# Patient Record
Sex: MALE | Race: WHITE | NOT HISPANIC OR LATINO | Employment: FULL TIME | URBAN - METROPOLITAN AREA
[De-identification: names, ages, dates, MRNs, and addresses within clinical notes are randomized per-mention and may not be internally consistent; named-entity substitution may affect disease eponyms.]

---

## 2017-01-03 ENCOUNTER — OFFICE VISIT (OUTPATIENT)
Dept: FAMILY MEDICINE CLINIC | Facility: CLINIC | Age: 32
End: 2017-01-03

## 2017-01-03 VITALS
TEMPERATURE: 98.9 F | HEIGHT: 74 IN | HEART RATE: 85 BPM | OXYGEN SATURATION: 97 % | DIASTOLIC BLOOD PRESSURE: 88 MMHG | BODY MASS INDEX: 39.4 KG/M2 | WEIGHT: 307 LBS | SYSTOLIC BLOOD PRESSURE: 122 MMHG

## 2017-01-03 DIAGNOSIS — Z86.79 HISTORY OF HYPERTENSION: ICD-10-CM

## 2017-01-03 DIAGNOSIS — N41.0 ACUTE PROSTATITIS: Primary | ICD-10-CM

## 2017-01-03 DIAGNOSIS — Z83.3 FAMILY HISTORY OF DIABETES MELLITUS IN FATHER: ICD-10-CM

## 2017-01-03 PROCEDURE — 99204 OFFICE O/P NEW MOD 45 MIN: CPT | Performed by: NURSE PRACTITIONER

## 2017-01-03 RX ORDER — SULFAMETHOXAZOLE AND TRIMETHOPRIM 800; 160 MG/1; MG/1
TABLET ORAL
Refills: 0 | COMMUNITY
Start: 2016-12-27 | End: 2017-01-11 | Stop reason: SDUPTHER

## 2017-01-03 NOTE — MR AVS SNAPSHOT
Fazal Grissomton   1/3/2017 2:00 PM   Office Visit    Provider:  MELODIE Ponce   Department:  [facility]   Dept Phone:  [phone]                Your Full Care Plan              Your Updated Medication List          This list is accurate as of: 1/3/17  2:37 PM.  Always use your most recent med list.                sulfamethoxazole-trimethoprim 800-160 MG per tablet   Commonly known as:  BACTRIM DS,SEPTRA DS               We Performed the Following     CBC and Differential     Comprehensive metabolic panel     Lipid panel     TSH     Urine Culture (Clean Catch)       You Were Diagnosed With        Codes Comments    Family history of diabetes mellitus in father    -  Primary ICD-10-CM: Z83.3  ICD-9-CM: V18.0     History of hypertension     ICD-10-CM: Z86.79  ICD-9-CM: V12.59     Acute prostatitis     ICD-10-CM: N41.0  ICD-9-CM: 601.0       Instructions     None    Patient Instructions History      Pica[employer]hart Signup     [facility] [employer] allows you to send messages to your doctor, view your test results, renew your prescriptions, schedule appointments, and more. To sign up, go to [employer] and click on the Sign Up Now link in the New User? box. Enter your [employer] Activation Code exactly as it appears below along with the last four digits of your Social Security Number and your Date of Birth () to complete the sign-up process. If you do not sign up before the expiration date, you must request a new code.    [employer] Activation Code: [email or web address]  Expires: 2017  2:37 PM    If you have questions, you can email [email or web address] or call [phone] to talk to our [employer] staff. Remember, [employer] is NOT to be used for urgent needs. For medical emergencies, dial 911.               Other Info from Your Visit           Allergies     No Known Allergies      Reason for Visit     urine problem establish care, went  "to [facility]      Vital Signs     Blood Pressure Pulse Temperature Height Weight Oxygen Saturation    122/88 85 98.9 °F (37.2 °C) 74\" (188 cm) 307 lb (139 kg) 97%    Body Mass Index Smoking Status                39.42 kg/m2 Current Every Day Smoker          Problems and Diagnoses Noted     Family history of diabetes mellitus in father    -  Primary    History of hypertension        Inflammatory disease of prostate          "

## 2017-01-03 NOTE — PROGRESS NOTES
Subjective   Fazal Tavarez is a 31 y.o. male.     History of Present Illness   Fazal Tavarez 31 y.o. male who presents today for a new patient appointment.    he has a history of There is no problem list on file for this patient.  .  I reviewed the PFSH recorded today by my MA/LPN staff.   he is here to establish care.  He has been feeling well.  Patient was seen at [facility] on 12/27 for urinary frequency and urgency. He was diagnosed with acute prostatitis.  He was started on Bactrim DS BID x 10 days.  He was told to f/u with PCP. He has 6 doses left. He states he feels some improvement but still having some frequency and urgency.  Will get urine culture.  Will repeat urinalysis after patient finishes abx.  He reports hx of high blood pressure but it is 122/88 today.  He does not take any medications.  He is a smoker and has smoked 1 and 1/2 PPD x 10 years. He quit for a year but then restarted. He is not ready to quit at this time but will f/u if he would like assistance.  Reports grandfather with lung CA, uncle with prostate CA and father with type 2 DM.  Patient has not had labs in some time.     The following portions of the patient's history were reviewed and updated as appropriate: allergies, current medications, past family history, past medical history, past social history, past surgical history and problem list.    Review of Systems   Genitourinary: Positive for frequency and urgency. Negative for dysuria.       Objective   Physical Exam   Constitutional: He is oriented to person, place, and time. He appears well-developed and well-nourished.   HENT:   Head: Normocephalic and atraumatic.   Neck: Carotid bruit is not present.   Cardiovascular: Normal rate and regular rhythm.    Pulmonary/Chest: Effort normal and breath sounds normal.   Neurological: He is alert and oriented to person, place, and time.   Skin: Skin is warm and dry.   Psychiatric: He has a normal mood and  affect. Judgment normal.   Vitals reviewed.      Assessment/Plan   Fazal was seen today for urine problem.    Diagnoses and all orders for this visit:    Acute prostatitis  -     Urine Culture (Clean Catch)    Family history of diabetes mellitus in father  -     Comprehensive metabolic panel  -     Lipid panel  -     CBC and Differential  -     TSH    History of hypertension  -     Comprehensive metabolic panel  -     Lipid panel  -     CBC and Differential  -     TSH             `

## 2017-01-05 LAB
BACTERIA UR CULT: NO GROWTH
BACTERIA UR CULT: NORMAL

## 2017-01-10 LAB
ALBUMIN SERPL-MCNC: 4.4 G/DL (ref 3.5–5.2)
ALBUMIN/GLOB SERPL: 1.7 G/DL
ALP SERPL-CCNC: 68 U/L (ref 39–117)
ALT SERPL-CCNC: 49 U/L (ref 1–41)
AST SERPL-CCNC: 37 U/L (ref 1–40)
BASOPHILS # BLD AUTO: 0.06 10*3/MM3 (ref 0–0.2)
BASOPHILS NFR BLD AUTO: 0.6 % (ref 0–1.5)
BILIRUB SERPL-MCNC: <0.2 MG/DL (ref 0.1–1.2)
BUN SERPL-MCNC: 14 MG/DL (ref 6–20)
BUN/CREAT SERPL: 16.9 (ref 7–25)
CALCIUM SERPL-MCNC: 9.5 MG/DL (ref 8.6–10.5)
CHLORIDE SERPL-SCNC: 100 MMOL/L (ref 98–107)
CHOLEST SERPL-MCNC: 202 MG/DL (ref 0–200)
CO2 SERPL-SCNC: 25.7 MMOL/L (ref 22–29)
CREAT SERPL-MCNC: 0.83 MG/DL (ref 0.76–1.27)
EOSINOPHIL # BLD AUTO: 0.31 10*3/MM3 (ref 0–0.7)
EOSINOPHIL NFR BLD AUTO: 2.9 % (ref 0.3–6.2)
ERYTHROCYTE [DISTWIDTH] IN BLOOD BY AUTOMATED COUNT: 14.6 % (ref 11.5–14.5)
GLOBULIN SER CALC-MCNC: 2.6 GM/DL
GLUCOSE SERPL-MCNC: 107 MG/DL (ref 65–99)
HCT VFR BLD AUTO: 39.4 % (ref 40.4–52.2)
HDLC SERPL-MCNC: 40 MG/DL (ref 40–60)
HGB BLD-MCNC: 12.8 G/DL (ref 13.7–17.6)
IMM GRANULOCYTES # BLD: 0.02 10*3/MM3 (ref 0–0.03)
IMM GRANULOCYTES NFR BLD: 0.2 % (ref 0–0.5)
LDLC SERPL CALC-MCNC: ABNORMAL MG/DL
LYMPHOCYTES # BLD AUTO: 3.62 10*3/MM3 (ref 0.9–4.8)
LYMPHOCYTES NFR BLD AUTO: 34.3 % (ref 19.6–45.3)
MCH RBC QN AUTO: 29.8 PG (ref 27–32.7)
MCHC RBC AUTO-ENTMCNC: 32.5 G/DL (ref 32.6–36.4)
MCV RBC AUTO: 91.6 FL (ref 79.8–96.2)
MONOCYTES # BLD AUTO: 0.5 10*3/MM3 (ref 0.2–1.2)
MONOCYTES NFR BLD AUTO: 4.7 % (ref 5–12)
NEUTROPHILS # BLD AUTO: 6.04 10*3/MM3 (ref 1.9–8.1)
NEUTROPHILS NFR BLD AUTO: 57.3 % (ref 42.7–76)
PLATELET # BLD AUTO: 192 10*3/MM3 (ref 140–500)
POTASSIUM SERPL-SCNC: 3.9 MMOL/L (ref 3.5–5.2)
PROT SERPL-MCNC: 7 G/DL (ref 6–8.5)
RBC # BLD AUTO: 4.3 10*6/MM3 (ref 4.6–6)
SODIUM SERPL-SCNC: 141 MMOL/L (ref 136–145)
TRIGL SERPL-MCNC: 422 MG/DL (ref 0–150)
TSH SERPL DL<=0.005 MIU/L-ACNC: 4.06 MIU/ML (ref 0.27–4.2)
VLDLC SERPL CALC-MCNC: ABNORMAL MG/DL
WBC # BLD AUTO: 10.55 10*3/MM3 (ref 4.5–10.7)

## 2017-01-11 DIAGNOSIS — N41.0 ACUTE PROSTATITIS: Primary | ICD-10-CM

## 2017-01-17 ENCOUNTER — TELEPHONE (OUTPATIENT)
Dept: FAMILY MEDICINE CLINIC | Facility: CLINIC | Age: 32
End: 2017-01-17

## 2017-01-17 DIAGNOSIS — D50.9 IRON DEFICIENCY ANEMIA, UNSPECIFIED IRON DEFICIENCY ANEMIA TYPE: Primary | ICD-10-CM

## 2017-01-17 DIAGNOSIS — E78.2 ELEVATED CHOLESTEROL WITH ELEVATED TRIGLYCERIDES: ICD-10-CM

## 2017-01-17 RX ORDER — SULFAMETHOXAZOLE AND TRIMETHOPRIM 800; 160 MG/1; MG/1
1 TABLET ORAL 2 TIMES DAILY
Qty: 20 TABLET | Refills: 0 | Status: SHIPPED | OUTPATIENT
Start: 2017-01-17 | End: 2017-01-30

## 2017-01-17 NOTE — TELEPHONE ENCOUNTER
----- Message from MELODIE Ponce sent at 1/11/2017  5:18 PM EST -----  Patient has impaired fasting glucose and significantly elevated triglycerides.  Encourage lifestyle modifications including diet and cardiovascular exercise. Repeat CMP and lipids in 3 months.  Patient also has some anemia.  If he is having GI issues then send him to GI but if not need to refer him to [facility] group for evaluation.

## 2017-01-17 NOTE — TELEPHONE ENCOUNTER
Pt was called and given info stated, pt is having edyta gi issues, will refer to GI, also new order for labs placed for 3 mos.

## 2017-01-30 ENCOUNTER — OFFICE VISIT (OUTPATIENT)
Dept: GASTROENTEROLOGY | Facility: CLINIC | Age: 32
End: 2017-01-30

## 2017-01-30 VITALS
BODY MASS INDEX: 39.78 KG/M2 | SYSTOLIC BLOOD PRESSURE: 130 MMHG | WEIGHT: 310 LBS | DIASTOLIC BLOOD PRESSURE: 86 MMHG | HEIGHT: 74 IN

## 2017-01-30 DIAGNOSIS — D64.9 ANEMIA, UNSPECIFIED TYPE: Primary | ICD-10-CM

## 2017-01-30 DIAGNOSIS — K62.5 RECTAL BLEEDING: ICD-10-CM

## 2017-01-30 PROCEDURE — 99204 OFFICE O/P NEW MOD 45 MIN: CPT | Performed by: INTERNAL MEDICINE

## 2017-01-30 RX ORDER — SODIUM CHLORIDE 0.9 % (FLUSH) 0.9 %
1-10 SYRINGE (ML) INJECTION AS NEEDED
Status: CANCELLED | OUTPATIENT
Start: 2017-01-30

## 2017-01-30 RX ORDER — NAPROXEN SODIUM 220 MG
220 TABLET ORAL 2 TIMES DAILY PRN
COMMUNITY
End: 2019-05-22

## 2017-01-30 RX ORDER — CETIRIZINE HYDROCHLORIDE 10 MG/1
10 TABLET ORAL DAILY
COMMUNITY

## 2017-01-30 RX ORDER — SODIUM CHLORIDE, SODIUM LACTATE, POTASSIUM CHLORIDE, CALCIUM CHLORIDE 600; 310; 30; 20 MG/100ML; MG/100ML; MG/100ML; MG/100ML
30 INJECTION, SOLUTION INTRAVENOUS CONTINUOUS
Status: CANCELLED | OUTPATIENT
Start: 2017-01-30

## 2017-01-30 RX ORDER — OMEPRAZOLE 20 MG/1
20 CAPSULE, DELAYED RELEASE ORAL DAILY
COMMUNITY
End: 2018-04-03 | Stop reason: SDUPTHER

## 2017-01-30 NOTE — PROGRESS NOTES
Chief Complaint   Patient presents with   • Anemia   • Rectal Bleeding     Subjective      HPI     Fazal Tavarez is a 31 y.o. male who presents for evaluation of rectal bleeding.  Episodes have been occurring off/on for last year.  Very sporadic in frequency.  He describes predominantly bright red blood noticed occasionally on the tissue with wiping or in the toilet itself.  He's had no associated abdominal pain or tenesmus.  He's had no unintentional weight loss.  He does have occasional heartburn for which he takes as needed Prilosec.  He also reports regular use of Aleve for headaches.  He had recent blood work performed to his PCP which showed evidence of a mild normocytic anemia.  He's had no prior upper or lower endoscopic evaluation.  He has no significant family history with regards to upper or lower GI disorders.    Past Medical History   Diagnosis Date   • Anemia    • Hyperlipidemia    • Hypertension        Current Outpatient Prescriptions:   •  cetirizine (zyrTEC) 10 MG tablet, Take 10 mg by mouth Daily., Disp: , Rfl:   •  naproxen sodium (ALEVE) 220 MG tablet, Take 220 mg by mouth 2 (Two) Times a Day As Needed for mild pain (1-3)., Disp: , Rfl:   •  omeprazole (priLOSEC) 20 MG capsule, Take 20 mg by mouth Daily., Disp: , Rfl:      No Known Allergies  Social History     Social History   • Marital status: Single     Spouse name: N/A   • Number of children: N/A   • Years of education: N/A     Occupational History   • Not on file.     Social History Main Topics   • Smoking status: Current Every Day Smoker     Packs/day: 1.50   • Smokeless tobacco: Never Used   • Alcohol use Yes      Comment: fifth a month   • Drug use: No   • Sexual activity: Not on file     Other Topics Concern   • Not on file     Social History Narrative     History reviewed. No pertinent family history.     Review of Systems   Gastrointestinal: Positive for anal bleeding. Negative for rectal pain.        GERD   All other systems  reviewed and are negative.       Objective   Vitals:    01/30/17 1514   BP: 130/86     Physical Exam   Constitutional: He is oriented to person, place, and time. He appears well-developed and well-nourished.   HENT:   Head: Normocephalic and atraumatic.   Mouth/Throat: Oropharynx is clear and moist.   Eyes: Conjunctivae are normal. No scleral icterus.   Neck: Normal range of motion. Neck supple. No thyromegaly present.   Cardiovascular: Normal rate and regular rhythm.  Exam reveals no friction rub.    No murmur heard.  Pulmonary/Chest: Effort normal and breath sounds normal. No respiratory distress. He has no wheezes. He has no rales. He exhibits no tenderness.   Abdominal: Soft. Bowel sounds are normal. He exhibits no distension and no mass. There is no tenderness. There is no rebound and no guarding. No hernia.   Genitourinary: Rectal exam shows external hemorrhoid and mass. Rectal exam shows no internal hemorrhoid, no fissure and anal tone normal.   Musculoskeletal: He exhibits no edema.   Lymphadenopathy:     He has no cervical adenopathy.     He has no axillary adenopathy.   Neurological: He is alert and oriented to person, place, and time.   Skin: Skin is warm and dry. No rash noted.   Psychiatric: He has a normal mood and affect. His behavior is normal. Judgment and thought content normal.   Nursing note and vitals reviewed.       Assessment/Plan      Fazal was seen today for anemia and rectal bleeding.    Diagnoses and all orders for this visit:    Anemia, unspecified type  -     Ferritin  -     Iron Profile  -     Folate  -     Vitamin B12  -     Case Request; Standing  -     sodium chloride 0.9 % flush 1-10 mL; Infuse 1-10 mL into a venous catheter As Needed for line care.  -     lactated ringers infusion; Infuse 30 mL/hr into a venous catheter Continuous.  -     Case Request    Rectal bleeding  -     Case Request; Standing  -     sodium chloride 0.9 % flush 1-10 mL; Infuse 1-10 mL into a venous catheter  As Needed for line care.  -     lactated ringers infusion; Infuse 30 mL/hr into a venous catheter Continuous.  -     Case Request    Other orders  -     Obtain Informed Consent; Standing  -     Verify bowel prep was successful; Standing  -     Give tap water enema if bowel prep was insufficient; Standing  -     Insert Peripheral IV; Standing  -     Saline Lock & Maintain IV Access; Standing      Check iron indices and B12/folate today  Arrange for EGD and colonoscopy for further evaluation of anemia  As needed hemorrhoidal cream for external hemorrhoids

## 2017-01-30 NOTE — MR AVS SNAPSHOT
Fazal Tavarez   2017 3:45 PM   Office Visit    Dept Phone:  [phone]   Encounter #:  [id number]    Provider:  Pratik Kaur MD   Department:  [facility]                Your Full Care Plan              Today's Medication Changes          These changes are accurate as of: 17  3:37 PM.  If you have any questions, ask your nurse or doctor.               Stop taking medication(s)listed here:     sulfamethoxazole-trimethoprim 800-160 MG per tablet   Commonly known as:  BACTRIM DS,SEPTRA DS   Stopped by:  Pratik Kaur MD                      Your Updated Medication List          This list is accurate as of: 17  3:37 PM.  Always use your most recent med list.                cetirizine 10 MG tablet   Commonly known as:  zyrTEC       naproxen sodium 220 MG tablet   Commonly known as:  ALEVE       omeprazole 20 MG capsule   Commonly known as:  priLOSEC               We Performed the Following     Case Request     Ferritin     Folate     Iron Profile     Vitamin B12       You Were Diagnosed With        Codes Comments    Anemia, unspecified type    -  Primary ICD-10-CM: D64.9  ICD-9-CM: 285.9     Rectal bleeding     ICD-10-CM: K62.5  ICD-9-CM: 569.3       Instructions     None    Patient Instructions History      Upcoming Appointments     Visit Type Date Time Department    NEW PATIENT 2017  3:45 PM [facility]      Recurious Signup     [facility] Rec[employer] allows you to send messages to your doctor, view your test results, renew your prescriptions, schedule appointments, and more. To sign up, go to [employer] and click on the Sign Up Now link in the New User? box. Enter your Recurious Activation Code exactly as it appears below along with the last four digits of your Social Security Number and your Date of Birth () to complete the sign-up process. If you do not sign up before the expiration date, you  "must request a new code.    [employer] Activation Code: [email or web address]  Expires: 2/13/2017  3:37 PM    If you have questions, you can email [employer] Top-Level [email or web address] or call [phone] to talk to our [employer] staff. Remember, [employer] is NOT to be used for urgent needs. For medical emergencies, dial 911.               Other Info from Your Visit           Your Appointments     Jan 30, 2017  3:45 PM EST   New Patient with Pratik Kaur MD   [facility] (--)    [address]  [place] [address] [phone]           Bring all previous medical records and films, along with current medications and insurance information.              Allergies     No Known Allergies      Reason for Visit     Anemia     Rectal Bleeding           Vital Signs     Blood Pressure Height Weight Body Mass Index Smoking Status       130/86 74\" (188 cm) 310 lb (141 kg) 39.8 kg/m2 Current Every Day Smoker       Problems and Diagnoses Noted     Anemia    -  Primary    Rectal bleeding            "

## 2017-01-30 NOTE — LETTER
January 30, 2017     Kyleigh Andrade, MELODIE  [address]  [address]  [place] [address]    Patient: Fazal Tavarez   YOB: 1985   Date of Visit: 1/30/2017       Dear Dr. Andrade, APRN:    Thank you for referring Fazal Tavarez to me for evaluation. Below is a copy of my consult note.    If you have questions, please do not hesitate to call me. I look forward to following Fazal along with you.         Sincerely,        Pratik Kaur MD        CC: No Recipients    Chief Complaint   Patient presents with   • Anemia   • Rectal Bleeding     Subjective      HPI     Fazal Tavarez is a 31 y.o. male who presents for evaluation of rectal bleeding.  Episodes have been occurring off/on for last year.  Very sporadic in frequency.  He describes predominantly bright red blood noticed occasionally on the tissue with wiping or in the toilet itself.  He's had no associated abdominal pain or tenesmus.  He's had no unintentional weight loss.  He does have occasional heartburn for which he takes as needed Prilosec.  He also reports regular use of Aleve for headaches.  He had recent blood work performed to his PCP which showed evidence of a mild normocytic anemia.  He's had no prior upper or lower endoscopic evaluation.  He has no significant family history with regards to upper or lower GI disorders.    Past Medical History   Diagnosis Date   • Anemia    • Hyperlipidemia    • Hypertension        Current Outpatient Prescriptions:   •  cetirizine (zyrTEC) 10 MG tablet, Take 10 mg by mouth Daily., Disp: , Rfl:   •  naproxen sodium (ALEVE) 220 MG tablet, Take 220 mg by mouth 2 (Two) Times a Day As Needed for mild pain (1-3)., Disp: , Rfl:   •  omeprazole (priLOSEC) 20 MG capsule, Take 20 mg by mouth Daily., Disp: , Rfl:      No Known Allergies  Social History     Social History   • Marital status: Single     Spouse name: N/A   • Number of children: N/A   • Years of education: N/A          Occupational History   • Not on file.     Social History Main Topics   • Smoking status: Current Every Day Smoker     Packs/day: 1.50   • Smokeless tobacco: Never Used   • Alcohol use Yes      Comment: fifth a month   • Drug use: No   • Sexual activity: Not on file     Other Topics Concern   • Not on file     Social History Narrative     History reviewed. No pertinent family history.     Review of Systems   Gastrointestinal: Positive for anal bleeding. Negative for rectal pain.        GERD   All other systems reviewed and are negative.       Objective   Vitals:    01/30/17 1514   BP: 130/86     Physical Exam   Constitutional: He is oriented to person, place, and time. He appears well-developed and well-nourished.   HENT:   Head: Normocephalic and atraumatic.   Mouth/Throat: Oropharynx is clear and moist.   Eyes: Conjunctivae are normal. No scleral icterus.   Neck: Normal range of motion. Neck supple. No thyromegaly present.   Cardiovascular: Normal rate and regular rhythm.  Exam reveals no friction rub.    No murmur heard.  Pulmonary/Chest: Effort normal and breath sounds normal. No respiratory distress. He has no wheezes. He has no rales. He exhibits no tenderness.   Abdominal: Soft. Bowel sounds are normal. He exhibits no distension and no mass. There is no tenderness. There is no rebound and no guarding. No hernia.   Genitourinary: Rectal exam shows external hemorrhoid and mass. Rectal exam shows no internal hemorrhoid, no fissure and anal tone normal.   Musculoskeletal: He exhibits no edema.   Lymphadenopathy:     He has no cervical adenopathy.     He has no axillary adenopathy.   Neurological: He is alert and oriented to person, place, and time.   Skin: Skin is warm and dry. No rash noted.   Psychiatric: He has a normal mood and affect. His behavior is normal. Judgment and thought content normal.   Nursing note and vitals reviewed.       Assessment/Plan      Fazal was seen today for anemia and rectal  bleeding.    Diagnoses and all orders for this visit:    Anemia, unspecified type  -     Ferritin  -     Iron Profile  -     Folate  -     Vitamin B12  -     Case Request; Standing  -     sodium chloride 0.9 % flush 1-10 mL; Infuse 1-10 mL into a venous catheter As Needed for line care.  -     lactated ringers infusion; Infuse 30 mL/hr into a venous catheter Continuous.  -     Case Request    Rectal bleeding  -     Case Request; Standing  -     sodium chloride 0.9 % flush 1-10 mL; Infuse 1-10 mL into a venous catheter As Needed for line care.  -     lactated ringers infusion; Infuse 30 mL/hr into a venous catheter Continuous.  -     Case Request    Other orders  -     Obtain Informed Consent; Standing  -     Verify bowel prep was successful; Standing  -     Give tap water enema if bowel prep was insufficient; Standing  -     Insert Peripheral IV; Standing  -     Saline Lock & Maintain IV Access; Standing      Check iron indices and B12/folate today  Arrange for EGD and colonoscopy for further evaluation of anemia  As needed hemorrhoidal cream for external hemorrhoids

## 2017-01-31 LAB
FERRITIN SERPL-MCNC: 24.07 NG/ML (ref 30–400)
FOLATE SERPL-MCNC: 17 NG/ML (ref 4.78–24.2)
IRON SATN MFR SERPL: 19 % (ref 20–50)
IRON SERPL-MCNC: 85 MCG/DL (ref 59–158)
TIBC SERPL-MCNC: 448 MCG/DL
UIBC SERPL-MCNC: 363 MCG/DL
VIT B12 SERPL-MCNC: 997 PG/ML (ref 211–946)

## 2017-03-15 ENCOUNTER — ANESTHESIA EVENT (OUTPATIENT)
Dept: GASTROENTEROLOGY | Facility: HOSPITAL | Age: 32
End: 2017-03-15

## 2017-03-15 ENCOUNTER — ANESTHESIA (OUTPATIENT)
Dept: GASTROENTEROLOGY | Facility: HOSPITAL | Age: 32
End: 2017-03-15

## 2017-03-15 ENCOUNTER — HOSPITAL ENCOUNTER (OUTPATIENT)
Facility: HOSPITAL | Age: 32
Setting detail: HOSPITAL OUTPATIENT SURGERY
Discharge: HOME | End: 2017-03-15
Attending: INTERNAL MEDICINE | Admitting: INTERNAL MEDICINE

## 2017-03-15 ENCOUNTER — TELEPHONE (OUTPATIENT)
Dept: GASTROENTEROLOGY | Facility: CLINIC | Age: 32
End: 2017-03-15

## 2017-03-15 VITALS
BODY MASS INDEX: 38.44 KG/M2 | SYSTOLIC BLOOD PRESSURE: 137 MMHG | TEMPERATURE: 97.6 F | DIASTOLIC BLOOD PRESSURE: 88 MMHG | OXYGEN SATURATION: 98 % | HEART RATE: 74 BPM | WEIGHT: 299.5 LBS | RESPIRATION RATE: 18 BRPM | HEIGHT: 74 IN

## 2017-03-15 DIAGNOSIS — D64.9 ANEMIA, UNSPECIFIED TYPE: ICD-10-CM

## 2017-03-15 DIAGNOSIS — K62.5 RECTAL BLEEDING: ICD-10-CM

## 2017-03-15 PROCEDURE — 88312 SPECIAL STAINS GROUP 1: CPT | Performed by: INTERNAL MEDICINE

## 2017-03-15 PROCEDURE — 88305 TISSUE EXAM BY PATHOLOGIST: CPT | Performed by: INTERNAL MEDICINE

## 2017-03-15 PROCEDURE — 43239 EGD BIOPSY SINGLE/MULTIPLE: CPT | Performed by: INTERNAL MEDICINE

## 2017-03-15 PROCEDURE — 45378 DIAGNOSTIC COLONOSCOPY: CPT | Performed by: INTERNAL MEDICINE

## 2017-03-15 PROCEDURE — 25010000002 PROPOFOL 10 MG/ML EMULSION: Performed by: ANESTHESIOLOGY

## 2017-03-15 RX ORDER — PROPOFOL 10 MG/ML
VIAL (ML) INTRAVENOUS AS NEEDED
Status: DISCONTINUED | OUTPATIENT
Start: 2017-03-15 | End: 2017-03-15 | Stop reason: SURG

## 2017-03-15 RX ORDER — LIDOCAINE HYDROCHLORIDE 20 MG/ML
INJECTION, SOLUTION INFILTRATION; PERINEURAL AS NEEDED
Status: DISCONTINUED | OUTPATIENT
Start: 2017-03-15 | End: 2017-03-15 | Stop reason: SURG

## 2017-03-15 RX ORDER — SODIUM CHLORIDE, SODIUM LACTATE, POTASSIUM CHLORIDE, CALCIUM CHLORIDE 600; 310; 30; 20 MG/100ML; MG/100ML; MG/100ML; MG/100ML
30 INJECTION, SOLUTION INTRAVENOUS CONTINUOUS
Status: DISCONTINUED | OUTPATIENT
Start: 2017-03-15 | End: 2017-03-15 | Stop reason: HOSPADM

## 2017-03-15 RX ORDER — PROPOFOL 10 MG/ML
VIAL (ML) INTRAVENOUS CONTINUOUS PRN
Status: DISCONTINUED | OUTPATIENT
Start: 2017-03-15 | End: 2017-03-15 | Stop reason: SURG

## 2017-03-15 RX ORDER — SODIUM CHLORIDE 0.9 % (FLUSH) 0.9 %
1-10 SYRINGE (ML) INJECTION AS NEEDED
Status: DISCONTINUED | OUTPATIENT
Start: 2017-03-15 | End: 2017-03-15 | Stop reason: HOSPADM

## 2017-03-15 RX ORDER — FERROUS SULFATE 325(65) MG
325 TABLET ORAL 2 TIMES DAILY WITH MEALS
Qty: 60 TABLET | Refills: 2 | Status: ON HOLD | OUTPATIENT
Start: 2017-03-15 | End: 2018-04-04

## 2017-03-15 RX ADMIN — PROPOFOL 200 MCG/KG/MIN: 10 INJECTION, EMULSION INTRAVENOUS at 14:06

## 2017-03-15 RX ADMIN — SODIUM CHLORIDE, POTASSIUM CHLORIDE, SODIUM LACTATE AND CALCIUM CHLORIDE: 600; 310; 30; 20 INJECTION, SOLUTION INTRAVENOUS at 14:06

## 2017-03-15 RX ADMIN — PROPOFOL 50 MG: 10 INJECTION, EMULSION INTRAVENOUS at 14:06

## 2017-03-15 RX ADMIN — SODIUM CHLORIDE, POTASSIUM CHLORIDE, SODIUM LACTATE AND CALCIUM CHLORIDE 30 ML/HR: 600; 310; 30; 20 INJECTION, SOLUTION INTRAVENOUS at 13:57

## 2017-03-15 RX ADMIN — LIDOCAINE HYDROCHLORIDE 60 MG: 20 INJECTION, SOLUTION INFILTRATION; PERINEURAL at 14:06

## 2017-03-15 NOTE — ANESTHESIA POSTPROCEDURE EVALUATION
Patient: Fazal Tavarez    Procedure Summary     Date Anesthesia Start Anesthesia Stop Room / Location    03/15/17 1404 1431  FER ENDOSCOPY 10 /  FER ENDOSCOPY       Procedure Diagnosis Surgeon Provider    ESOPHAGOGASTRODUODENOSCOPY WITH BIOPSIES (N/A Esophagus); COLONOSCOPY TO CECUM AND TERMINAL ILEUM (N/A ) Rectal bleeding; Anemia, unspecified type  (Rectal bleeding [K62.5]; Anemia, unspecified type [D64.9]) MD Connor Driscoll MD          Anesthesia Type: MAC  Last vitals  /91 (03/15/17 1351)    Temp 36.4 °C (97.6 °F) (03/15/17 1351)    Pulse 62 (03/15/17 1351)   Resp 16 (03/15/17 1351)    SpO2 97 % (03/15/17 1351)      Post Anesthesia Care and Evaluation    Patient location during evaluation: PACU  Patient participation: complete - patient participated  Level of consciousness: awake and alert  Pain management: adequate  Airway patency: patent  Anesthetic complications: No anesthetic complications    Cardiovascular status: acceptable  Respiratory status: acceptable  Hydration status: acceptable

## 2017-03-15 NOTE — TELEPHONE ENCOUNTER
----- Message from Pratik Kaur MD sent at 3/15/2017  2:35 PM EDT -----  Regarding: Capsule  Pt needs SBCE scheduled please.  Thanks.

## 2017-03-15 NOTE — TELEPHONE ENCOUNTER
Danielle,   Pt needs a small bowel capsule endoscopy per Dr Kaur. Indication is iron deficiency anemia (D50.9) in setting of a normal EGD and colonoscopy. Other diagnoses if needed: anemia and rectal bleeding.   Thanks,   Emelina

## 2017-03-15 NOTE — PLAN OF CARE
Problem: Patient Care Overview (Adult)  Goal: Discharge Needs Assessment  Outcome: Ongoing (interventions implemented as appropriate)    Problem: GI Endoscopy (Adult)  Goal: Signs and Symptoms of Listed Potential Problems Will be Absent or Manageable (GI Endoscopy)  Outcome: Ongoing (interventions implemented as appropriate)

## 2017-03-15 NOTE — ANESTHESIA PREPROCEDURE EVALUATION
Anesthesia Evaluation     Patient summary reviewed and Nursing notes reviewed   NPO Status: > 8 hours   Airway   Mallampati: III  Dental      Pulmonary - negative pulmonary ROS    breath sounds clear to auscultation  Cardiovascular     Rhythm: regular    (+) hypertension,       Neuro/Psych- negative ROS  GI/Hepatic/Renal/Endo    (+) obesity,  GERD,     Musculoskeletal (-) negative ROS    Abdominal    Substance History - negative use     OB/GYN negative ob/gyn ROS         Other                                    Anesthesia Plan    ASA 2     MAC     intravenous induction   Anesthetic plan and risks discussed with patient.

## 2017-03-16 LAB
CYTO UR: NORMAL
LAB AP CASE REPORT: NORMAL
Lab: NORMAL
PATH REPORT.FINAL DX SPEC: NORMAL
PATH REPORT.GROSS SPEC: NORMAL

## 2017-04-04 ENCOUNTER — TELEPHONE (OUTPATIENT)
Dept: GASTROENTEROLOGY | Facility: CLINIC | Age: 32
End: 2017-04-04

## 2017-04-04 NOTE — TELEPHONE ENCOUNTER
Patient called, advised as per Dr. Kaur's note his EGD biopsy showed minimal inflammation. Advised we will call him to set up a capsule study endoscopy. He verb understanding.

## 2017-04-04 NOTE — TELEPHONE ENCOUNTER
----- Message from Pratik Kaur MD sent at 3/21/2017 12:44 PM EDT -----  Minimal inflammation on EGD bx.  We await his capsule endoscopy

## 2017-04-17 ENCOUNTER — RESULTS ENCOUNTER (OUTPATIENT)
Dept: FAMILY MEDICINE CLINIC | Facility: CLINIC | Age: 32
End: 2017-04-17

## 2017-04-17 DIAGNOSIS — E78.2 ELEVATED CHOLESTEROL WITH ELEVATED TRIGLYCERIDES: ICD-10-CM

## 2017-04-24 ENCOUNTER — TELEPHONE (OUTPATIENT)
Dept: GASTROENTEROLOGY | Facility: CLINIC | Age: 32
End: 2017-04-24

## 2017-04-28 ENCOUNTER — TELEPHONE (OUTPATIENT)
Dept: GASTROENTEROLOGY | Facility: CLINIC | Age: 32
End: 2017-04-28

## 2018-02-13 ENCOUNTER — OFFICE VISIT (OUTPATIENT)
Dept: FAMILY MEDICINE CLINIC | Facility: CLINIC | Age: 33
End: 2018-02-13

## 2018-02-13 VITALS
HEIGHT: 74 IN | TEMPERATURE: 98.4 F | OXYGEN SATURATION: 99 % | SYSTOLIC BLOOD PRESSURE: 146 MMHG | WEIGHT: 310.5 LBS | DIASTOLIC BLOOD PRESSURE: 98 MMHG | BODY MASS INDEX: 39.85 KG/M2 | RESPIRATION RATE: 18 BRPM | HEART RATE: 75 BPM

## 2018-02-13 DIAGNOSIS — S46.812A STRAIN OF LEFT TRAPEZIUS MUSCLE, INITIAL ENCOUNTER: Primary | ICD-10-CM

## 2018-02-13 PROCEDURE — 99213 OFFICE O/P EST LOW 20 MIN: CPT | Performed by: NURSE PRACTITIONER

## 2018-02-13 RX ORDER — CYCLOBENZAPRINE HCL 10 MG
10 TABLET ORAL 3 TIMES DAILY PRN
Qty: 45 TABLET | Refills: 0 | Status: SHIPPED | OUTPATIENT
Start: 2018-02-13 | End: 2018-02-28

## 2018-02-13 RX ORDER — DICLOFENAC SODIUM 75 MG/1
75 TABLET, DELAYED RELEASE ORAL 2 TIMES DAILY
Qty: 60 TABLET | Refills: 0 | Status: SHIPPED | OUTPATIENT
Start: 2018-02-13 | End: 2018-03-12 | Stop reason: SDUPTHER

## 2018-02-13 NOTE — PROGRESS NOTES
Subjective   Fazal Tavarez is a 32 y.o. male.     History of Present Illness   Fazal Tavarez 32 y.o. male who presents for evaluation of neck pain. Event that precipitated these symptoms:  none recalled by the patient  {Onset of symptoms was 2 days ago, and have been gradually worsening since that time.  Location of this is in the left area.   Current pain symptoms are described as aching, tight (pulling) and stiff and occurs constantly.  The pain intensity is moderate.  Other associated symptoms include:  limited ROM. Treatment efforts have included: NSAID's and heat. with relief.  Patient has had recurrent self limited episodes of neck pain in the past.       The following portions of the patient's history were reviewed and updated as appropriate: allergies, current medications, past family history, past medical history, past social history, past surgical history and problem list.    Review of Systems   Musculoskeletal: Positive for neck pain and neck stiffness.   Neurological: Negative for weakness and numbness.       Objective   Physical Exam   Constitutional: He is oriented to person, place, and time. He appears well-developed and well-nourished.   Neck: Muscular tenderness present. No spinous process tenderness present. Decreased range of motion present.   Pulmonary/Chest: Effort normal.   Neurological: He is oriented to person, place, and time.   Skin: Skin is warm and dry.   Psychiatric: He has a normal mood and affect. His behavior is normal. Judgment and thought content normal.   Nursing note and vitals reviewed.      Assessment/Plan   Fazal was seen today for neck pain.    Diagnoses and all orders for this visit:    Strain of left trapezius muscle, initial encounter  -     diclofenac (VOLTAREN) 75 MG EC tablet; Take 1 tablet by mouth 2 (Two) Times a Day.  -     cyclobenzaprine (FLEXERIL) 10 MG tablet; Take 1 tablet by mouth 3 (Three) Times a Day As Needed for Muscle Spasms for up to 15  days.

## 2018-03-12 DIAGNOSIS — S46.812A STRAIN OF LEFT TRAPEZIUS MUSCLE, INITIAL ENCOUNTER: ICD-10-CM

## 2018-03-14 RX ORDER — DICLOFENAC SODIUM 75 MG/1
TABLET, DELAYED RELEASE ORAL
Qty: 60 TABLET | Refills: 0 | Status: ON HOLD | OUTPATIENT
Start: 2018-03-14 | End: 2018-04-04

## 2018-04-03 ENCOUNTER — OFFICE VISIT (OUTPATIENT)
Dept: FAMILY MEDICINE CLINIC | Facility: CLINIC | Age: 33
End: 2018-04-03

## 2018-04-03 VITALS
DIASTOLIC BLOOD PRESSURE: 86 MMHG | HEART RATE: 101 BPM | TEMPERATURE: 99.1 F | SYSTOLIC BLOOD PRESSURE: 134 MMHG | WEIGHT: 303 LBS | OXYGEN SATURATION: 98 % | RESPIRATION RATE: 18 BRPM | BODY MASS INDEX: 38.89 KG/M2 | HEIGHT: 74 IN

## 2018-04-03 DIAGNOSIS — K61.2 ANORECTAL ABSCESS: Primary | ICD-10-CM

## 2018-04-03 DIAGNOSIS — K21.9 GASTROESOPHAGEAL REFLUX DISEASE WITHOUT ESOPHAGITIS: ICD-10-CM

## 2018-04-03 PROCEDURE — 99214 OFFICE O/P EST MOD 30 MIN: CPT | Performed by: NURSE PRACTITIONER

## 2018-04-03 RX ORDER — OMEPRAZOLE 20 MG/1
20 CAPSULE, DELAYED RELEASE ORAL DAILY
Qty: 30 CAPSULE | Refills: 11 | Status: SHIPPED | OUTPATIENT
Start: 2018-04-03 | End: 2019-04-06 | Stop reason: SDUPTHER

## 2018-04-03 RX ORDER — AMOXICILLIN AND CLAVULANATE POTASSIUM 875; 125 MG/1; MG/1
1 TABLET, FILM COATED ORAL 2 TIMES DAILY
Qty: 20 TABLET | Refills: 0 | Status: SHIPPED | OUTPATIENT
Start: 2018-04-03 | End: 2018-04-11

## 2018-04-03 RX ORDER — HYDROCODONE BITARTRATE AND ACETAMINOPHEN 10; 325 MG/1; MG/1
1 TABLET ORAL EVERY 6 HOURS PRN
Qty: 20 TABLET | Refills: 0 | Status: SHIPPED | OUTPATIENT
Start: 2018-04-03 | End: 2018-04-11

## 2018-04-03 NOTE — PROGRESS NOTES
"Subjective   Fazal Tavarez is a 32 y.o. male.     History of Present Illness   Fazal Tavarez 32 y.o. male who presents for evaluation of burning to anal area that is worse with BM.  Reports he has felt \"knot\" to anal area. Symptoms have been present for 1 week .  The condition is aggravated by having BM . he is experiencing no other GI signs or symptoms.  Alleviating factors are Preparation H with no change in symptoms . Patient denies change in stools, abdominal pain, melena and bright red blood in stool his past medical history is notable for hemorrhoids noted on .  Patient denies recent travel.      Patient has been taking OTC prilosec for GERD and reports symptoms are well controlled. He would like RX for this as it would be cheaper.    The following portions of the patient's history were reviewed and updated as appropriate: allergies, current medications, past family history, past medical history, past social history, past surgical history and problem list.    Review of Systems   Gastrointestinal: Positive for constipation. Negative for abdominal pain, blood in stool, diarrhea, nausea and vomiting.       Objective   Physical Exam   Constitutional: He is oriented to person, place, and time. He appears well-developed and well-nourished.   Pulmonary/Chest: Effort normal.   Genitourinary: Rectal exam shows tenderness.         Genitourinary Comments: firm, tender abscess to left anal area with surrounding erythema and edema   Neurological: He is oriented to person, place, and time.   Skin: Skin is warm and dry.   Psychiatric: He has a normal mood and affect. His behavior is normal. Judgment and thought content normal.   Nursing note and vitals reviewed.      Assessment/Plan   Fazal was seen today for hemorrhoids and heartburn.    Diagnoses and all orders for this visit:    Anorectal abscess  -     amoxicillin-clavulanate (AUGMENTIN) 875-125 MG per tablet; Take 1 tablet by mouth 2 (Two) Times a Day for 10 " days.    Gastroesophageal reflux disease without esophagitis  -     omeprazole (priLOSEC) 20 MG capsule; Take 1 capsule by mouth Daily.      Patient given RX for norco. Controlled substance documents signed. RAKESH reviewed.      Patient to contact Dr. White's office at 8 AM to determine what time he will be seen.  He was given contact information.

## 2018-04-04 ENCOUNTER — ANESTHESIA EVENT (OUTPATIENT)
Dept: PERIOP | Facility: HOSPITAL | Age: 33
End: 2018-04-04

## 2018-04-04 ENCOUNTER — PREP FOR SURGERY (OUTPATIENT)
Dept: OTHER | Facility: HOSPITAL | Age: 33
End: 2018-04-04

## 2018-04-04 ENCOUNTER — ANESTHESIA (OUTPATIENT)
Dept: PERIOP | Facility: HOSPITAL | Age: 33
End: 2018-04-04

## 2018-04-04 ENCOUNTER — HOSPITAL ENCOUNTER (OUTPATIENT)
Facility: HOSPITAL | Age: 33
Setting detail: HOSPITAL OUTPATIENT SURGERY
Discharge: HOME | End: 2018-04-04
Attending: SURGERY | Admitting: SURGERY

## 2018-04-04 VITALS
HEART RATE: 75 BPM | SYSTOLIC BLOOD PRESSURE: 132 MMHG | HEIGHT: 74 IN | BODY MASS INDEX: 38.15 KG/M2 | DIASTOLIC BLOOD PRESSURE: 85 MMHG | WEIGHT: 297.25 LBS | OXYGEN SATURATION: 98 % | TEMPERATURE: 98.1 F | RESPIRATION RATE: 16 BRPM

## 2018-04-04 DIAGNOSIS — K61.0 PERIANAL ABSCESS: Primary | ICD-10-CM

## 2018-04-04 DIAGNOSIS — K61.0 PERIANAL ABSCESS: ICD-10-CM

## 2018-04-04 LAB
ANION GAP SERPL CALCULATED.3IONS-SCNC: 15.1 MMOL/L
BUN BLD-MCNC: 18 MG/DL (ref 6–20)
BUN/CREAT SERPL: 16.7 (ref 7–25)
CALCIUM SPEC-SCNC: 10 MG/DL (ref 8.6–10.5)
CHLORIDE SERPL-SCNC: 97 MMOL/L (ref 98–107)
CO2 SERPL-SCNC: 24.9 MMOL/L (ref 22–29)
CREAT BLD-MCNC: 1.08 MG/DL (ref 0.76–1.27)
DEPRECATED RDW RBC AUTO: 44.5 FL (ref 37–54)
ERYTHROCYTE [DISTWIDTH] IN BLOOD BY AUTOMATED COUNT: 12.9 % (ref 11.5–14.5)
GFR SERPL CREATININE-BSD FRML MDRD: 79 ML/MIN/1.73
GLUCOSE BLD-MCNC: 112 MG/DL (ref 65–99)
HCT VFR BLD AUTO: 44.6 % (ref 40.4–52.2)
HGB BLD-MCNC: 14.6 G/DL (ref 13.7–17.6)
MCH RBC QN AUTO: 30.8 PG (ref 27–32.7)
MCHC RBC AUTO-ENTMCNC: 32.7 G/DL (ref 32.6–36.4)
MCV RBC AUTO: 94.1 FL (ref 79.8–96.2)
PLATELET # BLD AUTO: 201 10*3/MM3 (ref 140–500)
PMV BLD AUTO: 10.9 FL (ref 6–12)
POTASSIUM BLD-SCNC: 4.1 MMOL/L (ref 3.5–5.2)
RBC # BLD AUTO: 4.74 10*6/MM3 (ref 4.6–6)
SODIUM BLD-SCNC: 137 MMOL/L (ref 136–145)
WBC NRBC COR # BLD: 9.63 10*3/MM3 (ref 4.5–10.7)

## 2018-04-04 PROCEDURE — 25010000002 ONDANSETRON PER 1 MG: Performed by: NURSE ANESTHETIST, CERTIFIED REGISTERED

## 2018-04-04 PROCEDURE — 85027 COMPLETE CBC AUTOMATED: CPT | Performed by: SURGERY

## 2018-04-04 PROCEDURE — 25010000002 MIDAZOLAM PER 1 MG: Performed by: ANESTHESIOLOGY

## 2018-04-04 PROCEDURE — 25010000002 FENTANYL CITRATE (PF) 100 MCG/2ML SOLUTION: Performed by: NURSE ANESTHETIST, CERTIFIED REGISTERED

## 2018-04-04 PROCEDURE — 25010000002 PROPOFOL 10 MG/ML EMULSION: Performed by: NURSE ANESTHETIST, CERTIFIED REGISTERED

## 2018-04-04 PROCEDURE — 45000 DRAINAGE OF PELVIC ABSCESS: CPT | Performed by: SURGERY

## 2018-04-04 PROCEDURE — 80048 BASIC METABOLIC PNL TOTAL CA: CPT | Performed by: SURGERY

## 2018-04-04 PROCEDURE — 25010000002 DEXAMETHASONE PER 1 MG: Performed by: NURSE ANESTHETIST, CERTIFIED REGISTERED

## 2018-04-04 PROCEDURE — 25010000003 CEFAZOLIN IN DEXTROSE 2-4 GM/100ML-% SOLUTION: Performed by: SURGERY

## 2018-04-04 PROCEDURE — 25010000002 KETOROLAC TROMETHAMINE PER 15 MG: Performed by: NURSE ANESTHETIST, CERTIFIED REGISTERED

## 2018-04-04 RX ORDER — ROCURONIUM BROMIDE 10 MG/ML
INJECTION, SOLUTION INTRAVENOUS AS NEEDED
Status: DISCONTINUED | OUTPATIENT
Start: 2018-04-04 | End: 2018-04-04 | Stop reason: SURG

## 2018-04-04 RX ORDER — DOCUSATE SODIUM 100 MG/1
100 CAPSULE, LIQUID FILLED ORAL 2 TIMES DAILY
Qty: 30 CAPSULE | Refills: 1 | Status: SHIPPED | OUTPATIENT
Start: 2018-04-04 | End: 2019-04-04

## 2018-04-04 RX ORDER — MIDAZOLAM HYDROCHLORIDE 1 MG/ML
1 INJECTION INTRAMUSCULAR; INTRAVENOUS
Status: DISCONTINUED | OUTPATIENT
Start: 2018-04-04 | End: 2018-04-04 | Stop reason: HOSPADM

## 2018-04-04 RX ORDER — ONDANSETRON 2 MG/ML
4 INJECTION INTRAMUSCULAR; INTRAVENOUS ONCE AS NEEDED
Status: DISCONTINUED | OUTPATIENT
Start: 2018-04-04 | End: 2018-04-04 | Stop reason: HOSPADM

## 2018-04-04 RX ORDER — FENTANYL CITRATE 50 UG/ML
50 INJECTION, SOLUTION INTRAMUSCULAR; INTRAVENOUS
Status: DISCONTINUED | OUTPATIENT
Start: 2018-04-04 | End: 2018-04-04 | Stop reason: HOSPADM

## 2018-04-04 RX ORDER — CELECOXIB 200 MG/1
200 CAPSULE ORAL ONCE
Status: COMPLETED | OUTPATIENT
Start: 2018-04-04 | End: 2018-04-04

## 2018-04-04 RX ORDER — NALOXONE HCL 0.4 MG/ML
0.2 VIAL (ML) INJECTION AS NEEDED
Status: DISCONTINUED | OUTPATIENT
Start: 2018-04-04 | End: 2018-04-04 | Stop reason: HOSPADM

## 2018-04-04 RX ORDER — DEXAMETHASONE SODIUM PHOSPHATE 10 MG/ML
INJECTION INTRAMUSCULAR; INTRAVENOUS AS NEEDED
Status: DISCONTINUED | OUTPATIENT
Start: 2018-04-04 | End: 2018-04-04 | Stop reason: SURG

## 2018-04-04 RX ORDER — PROMETHAZINE HYDROCHLORIDE 25 MG/ML
12.5 INJECTION, SOLUTION INTRAMUSCULAR; INTRAVENOUS ONCE AS NEEDED
Status: DISCONTINUED | OUTPATIENT
Start: 2018-04-04 | End: 2018-04-04 | Stop reason: HOSPADM

## 2018-04-04 RX ORDER — FAMOTIDINE 10 MG/ML
20 INJECTION, SOLUTION INTRAVENOUS ONCE
Status: COMPLETED | OUTPATIENT
Start: 2018-04-04 | End: 2018-04-04

## 2018-04-04 RX ORDER — HYDRALAZINE HYDROCHLORIDE 20 MG/ML
5 INJECTION INTRAMUSCULAR; INTRAVENOUS
Status: DISCONTINUED | OUTPATIENT
Start: 2018-04-04 | End: 2018-04-04 | Stop reason: HOSPADM

## 2018-04-04 RX ORDER — HYDROMORPHONE HCL 110MG/55ML
0.5 PATIENT CONTROLLED ANALGESIA SYRINGE INTRAVENOUS
Status: DISCONTINUED | OUTPATIENT
Start: 2018-04-04 | End: 2018-04-04 | Stop reason: HOSPADM

## 2018-04-04 RX ORDER — ACETAMINOPHEN 325 MG/1
650 TABLET ORAL ONCE
Status: COMPLETED | OUTPATIENT
Start: 2018-04-04 | End: 2018-04-04

## 2018-04-04 RX ORDER — CEFAZOLIN SODIUM 2 G/100ML
2 INJECTION, SOLUTION INTRAVENOUS ONCE
Status: COMPLETED | OUTPATIENT
Start: 2018-04-04 | End: 2018-04-04

## 2018-04-04 RX ORDER — MIDAZOLAM HYDROCHLORIDE 1 MG/ML
2 INJECTION INTRAMUSCULAR; INTRAVENOUS
Status: DISCONTINUED | OUTPATIENT
Start: 2018-04-04 | End: 2018-04-04 | Stop reason: HOSPADM

## 2018-04-04 RX ORDER — EPHEDRINE SULFATE 50 MG/ML
5 INJECTION, SOLUTION INTRAVENOUS ONCE AS NEEDED
Status: DISCONTINUED | OUTPATIENT
Start: 2018-04-04 | End: 2018-04-04 | Stop reason: HOSPADM

## 2018-04-04 RX ORDER — OXYCODONE HCL 10 MG/1
10 TABLET, FILM COATED, EXTENDED RELEASE ORAL ONCE
Status: CANCELLED | OUTPATIENT
Start: 2018-04-04 | End: 2018-04-04

## 2018-04-04 RX ORDER — SODIUM CHLORIDE, SODIUM LACTATE, POTASSIUM CHLORIDE, CALCIUM CHLORIDE 600; 310; 30; 20 MG/100ML; MG/100ML; MG/100ML; MG/100ML
9 INJECTION, SOLUTION INTRAVENOUS CONTINUOUS
Status: DISCONTINUED | OUTPATIENT
Start: 2018-04-04 | End: 2018-04-04 | Stop reason: HOSPADM

## 2018-04-04 RX ORDER — PROMETHAZINE HYDROCHLORIDE 25 MG/1
25 TABLET ORAL ONCE AS NEEDED
Status: DISCONTINUED | OUTPATIENT
Start: 2018-04-04 | End: 2018-04-04 | Stop reason: HOSPADM

## 2018-04-04 RX ORDER — ACETAMINOPHEN 325 MG/1
650 TABLET ORAL ONCE
Status: CANCELLED | OUTPATIENT
Start: 2018-04-04 | End: 2018-04-04

## 2018-04-04 RX ORDER — ONDANSETRON 2 MG/ML
INJECTION INTRAMUSCULAR; INTRAVENOUS AS NEEDED
Status: DISCONTINUED | OUTPATIENT
Start: 2018-04-04 | End: 2018-04-04 | Stop reason: SURG

## 2018-04-04 RX ORDER — LIDOCAINE HYDROCHLORIDE 10 MG/ML
0.5 INJECTION, SOLUTION EPIDURAL; INFILTRATION; INTRACAUDAL; PERINEURAL ONCE AS NEEDED
Status: DISCONTINUED | OUTPATIENT
Start: 2018-04-04 | End: 2018-04-04 | Stop reason: HOSPADM

## 2018-04-04 RX ORDER — PROMETHAZINE HYDROCHLORIDE 25 MG/1
25 SUPPOSITORY RECTAL ONCE AS NEEDED
Status: DISCONTINUED | OUTPATIENT
Start: 2018-04-04 | End: 2018-04-04 | Stop reason: HOSPADM

## 2018-04-04 RX ORDER — OXYCODONE AND ACETAMINOPHEN 7.5; 325 MG/1; MG/1
1 TABLET ORAL ONCE AS NEEDED
Status: DISCONTINUED | OUTPATIENT
Start: 2018-04-04 | End: 2018-04-04 | Stop reason: HOSPADM

## 2018-04-04 RX ORDER — KETOROLAC TROMETHAMINE 30 MG/ML
INJECTION, SOLUTION INTRAMUSCULAR; INTRAVENOUS AS NEEDED
Status: DISCONTINUED | OUTPATIENT
Start: 2018-04-04 | End: 2018-04-04 | Stop reason: SURG

## 2018-04-04 RX ORDER — FLUMAZENIL 0.1 MG/ML
0.2 INJECTION INTRAVENOUS AS NEEDED
Status: DISCONTINUED | OUTPATIENT
Start: 2018-04-04 | End: 2018-04-04 | Stop reason: HOSPADM

## 2018-04-04 RX ORDER — MAGNESIUM HYDROXIDE 1200 MG/15ML
LIQUID ORAL AS NEEDED
Status: DISCONTINUED | OUTPATIENT
Start: 2018-04-04 | End: 2018-04-04 | Stop reason: HOSPADM

## 2018-04-04 RX ORDER — HYDROCODONE BITARTRATE AND ACETAMINOPHEN 7.5; 325 MG/1; MG/1
1 TABLET ORAL ONCE AS NEEDED
Status: COMPLETED | OUTPATIENT
Start: 2018-04-04 | End: 2018-04-04

## 2018-04-04 RX ORDER — DIPHENHYDRAMINE HYDROCHLORIDE 50 MG/ML
12.5 INJECTION INTRAMUSCULAR; INTRAVENOUS
Status: DISCONTINUED | OUTPATIENT
Start: 2018-04-04 | End: 2018-04-04 | Stop reason: HOSPADM

## 2018-04-04 RX ORDER — LABETALOL HYDROCHLORIDE 5 MG/ML
5 INJECTION, SOLUTION INTRAVENOUS
Status: DISCONTINUED | OUTPATIENT
Start: 2018-04-04 | End: 2018-04-04 | Stop reason: HOSPADM

## 2018-04-04 RX ORDER — FENTANYL CITRATE 50 UG/ML
INJECTION, SOLUTION INTRAMUSCULAR; INTRAVENOUS AS NEEDED
Status: DISCONTINUED | OUTPATIENT
Start: 2018-04-04 | End: 2018-04-04 | Stop reason: SURG

## 2018-04-04 RX ORDER — CEFAZOLIN SODIUM 2 G/100ML
2 INJECTION, SOLUTION INTRAVENOUS ONCE
Status: CANCELLED | OUTPATIENT
Start: 2018-04-04 | End: 2018-04-04

## 2018-04-04 RX ORDER — PROPOFOL 10 MG/ML
VIAL (ML) INTRAVENOUS AS NEEDED
Status: DISCONTINUED | OUTPATIENT
Start: 2018-04-04 | End: 2018-04-04 | Stop reason: SURG

## 2018-04-04 RX ORDER — SODIUM CHLORIDE 0.9 % (FLUSH) 0.9 %
1-10 SYRINGE (ML) INJECTION AS NEEDED
Status: DISCONTINUED | OUTPATIENT
Start: 2018-04-04 | End: 2018-04-04 | Stop reason: HOSPADM

## 2018-04-04 RX ORDER — OXYCODONE HCL 10 MG/1
10 TABLET, FILM COATED, EXTENDED RELEASE ORAL ONCE
Status: COMPLETED | OUTPATIENT
Start: 2018-04-04 | End: 2018-04-04

## 2018-04-04 RX ORDER — LIDOCAINE HYDROCHLORIDE 20 MG/ML
INJECTION, SOLUTION INFILTRATION; PERINEURAL AS NEEDED
Status: DISCONTINUED | OUTPATIENT
Start: 2018-04-04 | End: 2018-04-04 | Stop reason: SURG

## 2018-04-04 RX ORDER — CELECOXIB 200 MG/1
200 CAPSULE ORAL ONCE
Status: CANCELLED | OUTPATIENT
Start: 2018-04-04 | End: 2018-04-04

## 2018-04-04 RX ORDER — OXYCODONE AND ACETAMINOPHEN 7.5; 325 MG/1; MG/1
1 TABLET ORAL EVERY 4 HOURS PRN
Qty: 20 TABLET | Refills: 0 | Status: SHIPPED | OUTPATIENT
Start: 2018-04-04 | End: 2019-04-04

## 2018-04-04 RX ADMIN — DEXAMETHASONE SODIUM PHOSPHATE 8 MG: 10 INJECTION INTRAMUSCULAR; INTRAVENOUS at 13:30

## 2018-04-04 RX ADMIN — HYDROCODONE BITARTRATE AND ACETAMINOPHEN 1 TABLET: 7.5; 325 TABLET ORAL at 14:39

## 2018-04-04 RX ADMIN — CELECOXIB 200 MG: 200 CAPSULE ORAL at 10:01

## 2018-04-04 RX ADMIN — ONDANSETRON 4 MG: 2 INJECTION INTRAMUSCULAR; INTRAVENOUS at 13:30

## 2018-04-04 RX ADMIN — MIDAZOLAM 2 MG: 1 INJECTION INTRAMUSCULAR; INTRAVENOUS at 12:52

## 2018-04-04 RX ADMIN — LIDOCAINE HYDROCHLORIDE 100 MG: 20 INJECTION, SOLUTION INFILTRATION; PERINEURAL at 13:15

## 2018-04-04 RX ADMIN — PROPOFOL 200 MG: 10 INJECTION, EMULSION INTRAVENOUS at 13:15

## 2018-04-04 RX ADMIN — ROCURONIUM BROMIDE 40 MG: 10 INJECTION INTRAVENOUS at 13:15

## 2018-04-04 RX ADMIN — SODIUM CHLORIDE, POTASSIUM CHLORIDE, SODIUM LACTATE AND CALCIUM CHLORIDE: 600; 310; 30; 20 INJECTION, SOLUTION INTRAVENOUS at 14:01

## 2018-04-04 RX ADMIN — MIDAZOLAM 2 MG: 1 INJECTION INTRAMUSCULAR; INTRAVENOUS at 10:07

## 2018-04-04 RX ADMIN — SUGAMMADEX 230 MG: 100 INJECTION, SOLUTION INTRAVENOUS at 13:58

## 2018-04-04 RX ADMIN — FENTANYL CITRATE 50 MCG: 50 INJECTION INTRAMUSCULAR; INTRAVENOUS at 13:42

## 2018-04-04 RX ADMIN — FAMOTIDINE 20 MG: 10 INJECTION INTRAVENOUS at 10:06

## 2018-04-04 RX ADMIN — OXYCODONE HYDROCHLORIDE 10 MG: 10 TABLET, FILM COATED, EXTENDED RELEASE ORAL at 10:01

## 2018-04-04 RX ADMIN — KETOROLAC TROMETHAMINE 30 MG: 30 INJECTION, SOLUTION INTRAMUSCULAR; INTRAVENOUS at 13:30

## 2018-04-04 RX ADMIN — FENTANYL CITRATE 100 MCG: 50 INJECTION INTRAMUSCULAR; INTRAVENOUS at 13:15

## 2018-04-04 RX ADMIN — SODIUM CHLORIDE, POTASSIUM CHLORIDE, SODIUM LACTATE AND CALCIUM CHLORIDE 9 ML/HR: 600; 310; 30; 20 INJECTION, SOLUTION INTRAVENOUS at 09:40

## 2018-04-04 RX ADMIN — ACETAMINOPHEN 650 MG: 325 TABLET ORAL at 10:01

## 2018-04-04 RX ADMIN — METRONIDAZOLE 500 MG: 500 INJECTION, SOLUTION INTRAVENOUS at 11:45

## 2018-04-04 RX ADMIN — CEFAZOLIN SODIUM 2 G: 2 INJECTION, SOLUTION INTRAVENOUS at 13:20

## 2018-04-04 NOTE — ANESTHESIA POSTPROCEDURE EVALUATION
Patient: Fazal Tavarez    Procedure Summary     Date:  04/04/18 Room / Location:  [facility] OR 09 / [facility] MAIN OR    Anesthesia Start:  1307 Anesthesia Stop:  1416    Procedure:  INCISION AND DRAINAGE OF PERIRECTAL ABSCESS (N/A Perirectal) Diagnosis:       Perianal abscess      (Perianal abscess [K61.0])    Surgeon:  Denise White MD Provider:  Judy Ferrer MD    Anesthesia Type:  general ASA Status:  2          Anesthesia Type: general  Last vitals  BP   137/88 (04/04/18 1450)   Temp   36.7 °C (98.1 °F) (04/04/18 1411)   Pulse   77 (04/04/18 1450)   Resp   16 (04/04/18 1450)     SpO2   98 % (04/04/18 1450)     Post Anesthesia Care and Evaluation    Patient location during evaluation: bedside  Patient participation: complete - patient participated  Level of consciousness: awake  Pain management: adequate  Airway patency: patent  Anesthetic complications: No anesthetic complications    Cardiovascular status: acceptable  Respiratory status: acceptable  Hydration status: acceptable

## 2018-04-04 NOTE — ANESTHESIA PROCEDURE NOTES
Airway  Urgency: elective    Date/Time: 4/4/2018 1:18 PM  Airway not difficult    General Information and Staff    Patient location during procedure: OR  Anesthesiologist: XIOMARA FISH  CRNA: STEFFANY NAIDU    Indications and Patient Condition  Indications for airway management: airway protection    Preoxygenated: yes  MILS maintained throughout  Mask difficulty assessment: 1 - vent by mask    Final Airway Details  Final airway type: endotracheal airway      Successful airway: ETT  Cuffed: yes   Successful intubation technique: direct laryngoscopy  Facilitating devices/methods: intubating stylet  Endotracheal tube insertion site: oral  Blade: Padgett  Blade size: #2  ETT size: 8.0 mm  Cormack-Lehane Classification: grade I - full view of glottis  Placement verified by: chest auscultation and capnometry   Measured from: lips  ETT to lips (cm): 23  Number of attempts at approach: 1

## 2018-04-04 NOTE — PERIOPERATIVE NURSING NOTE
Spoke to Dr White by phone to clarify per pt request about sitz baths.  Home health to do dressing changes once daily post sitz bath for one week, pt to do sitz baths daily as needed after one week and no longer packing.  Information relayed to pt and mother, verbalized understanding

## 2018-04-04 NOTE — ANESTHESIA PREPROCEDURE EVALUATION
Anesthesia Evaluation     Patient summary reviewed and Nursing notes reviewed   no history of anesthetic complications:  NPO Solid Status: > 8 hours  NPO Liquid Status: > 2 hours           Airway   Mallampati: II  TM distance: >3 FB  Neck ROM: full  no difficulty expected  Dental - normal exam     Pulmonary - normal exam   (+) a smoker Current Abstained day of surgery,   (-) COPD, asthma  Cardiovascular - normal exam  Exercise tolerance: excellent (>7 METS)    Rhythm: regular  Rate: normal    (+) hypertension well controlled, hyperlipidemia,   (-) valvular problems/murmurs, past MI, CAD      Neuro/Psych- negative ROS  (-) seizures, TIA, CVA  GI/Hepatic/Renal/Endo    (+) obesity,  GERD well controlled,    (-) liver disease, no renal disease, diabetes, hypothyroidism    Musculoskeletal (-) negative ROS    Abdominal  - normal exam   Substance History - negative use     OB/GYN negative ob/gyn ROS         Other - negative ROS           Phys Exam Other: Bearded              Anesthesia Plan    ASA 2     general     intravenous induction   Anesthetic plan and risks discussed with patient.    Plan discussed with CRNA and attending.

## 2018-04-04 NOTE — H&P
Cc: SURGERY Visit    HPI: 32 y.o. male here for for I and D of perianal abscess with a grandfather with history of buttocks boils.  The patient states that he began with soreness around the anus about a week, then worsened and seen by Dr Gerard yesterday and started on abx.  He has a mild fever.  He began to have drainage yesterday.      He had a cscope about a year ago without findings of crohn's, and no family history of Crohn's.      Past Medical History:   Diagnosis Date   • Anemia    • External hemorrhoid    • GERD (gastroesophageal reflux disease)    • Hyperlipidemia    • Hypertension    • Perirectal abscess    • Rectal bleeding    • Seasonal allergies        Past Surgical History:   Procedure Laterality Date   • COLONOSCOPY W/ POLYPECTOMY N/A 3/15/2017    Procedure: COLONOSCOPY TO CECUM AND TERMINAL ILEUM;  Surgeon: Pratik Kaur MD;  Location: [facility] ENDOSCOPY;  Service:    • ENDOSCOPY N/A 3/15/2017    Procedure: ESOPHAGOGASTRODUODENOSCOPY WITH BIOPSIES;  Surgeon: Pratik Kaur MD;  Location: [facility] ENDOSCOPY;  Service:    • WISDOM TOOTH EXTRACTION         has No Known Allergies.       Medication List      ASK your doctor about these medications    amoxicillin-clavulanate 875-125 MG per tablet  Commonly known as:  AUGMENTIN  Take 1 tablet by mouth 2 (Two) Times a Day for 10 days.     cetirizine 10 MG tablet  Commonly known as:  zyrTEC     DICLOFENAC PO     HYDROcodone-acetaminophen  MG per tablet  Commonly known as:  NORCO  Take 1 tablet by mouth Every 6 (Six) Hours As Needed for Moderate Pain    (pain).     naproxen sodium 220 MG tablet  Commonly known as:  ALEVE     omeprazole 20 MG capsule  Commonly known as:  priLOSEC  Take 1 capsule by mouth Daily.          History reviewed. No pertinent family history.    Social History     Social History   • Marital status: Single     Spouse name: N/A   • Number of children: N/A   • Years of education: N/A     Occupational History   • Not on  file.     Social History Main Topics   • Smoking status: Current Every Day Smoker     Packs/day: 1.50     Years: 10.00     Types: Cigarettes, Electronic Cigarette   • Smokeless tobacco: Never Used      Comment: WENT TO E-CIGARETTES IN 3/2018   • Alcohol use Yes      Comment: 30 PACK A BEER A WEEK    • Drug use: No   • Sexual activity: Defer     Other Topics Concern   • Not on file     Social History Narrative   • No narrative on file       Vitals:    04/04/18 1145   BP:    Pulse: 73   Resp: 18   Temp:    SpO2: 97%       Body mass index is 38.16 kg/m².    Physical Exam    General: No acute distress  Lungs: No labored breathing, Pulse oximetry on room air is 97%.  Heart: RRR  Abdo: Soft  Rectal:  Bloody drainage with tenderness and induration right anterior  Mental:  Awake, alert, and oriented    Imp:     · Perianal abscess, right anterior  ·  by trade  · HTN     Plan:  · I and D in OR.      Denise White MD  12:28 PM

## 2018-04-04 NOTE — OP NOTE
SURGERY  Operative Note :  CINDY Tavarez  1985    Procedure Date: 04/04/18    Pre-op Diagnosis:  · Perianal abscess, left anterior    Post-op Diagnosis:  · Same    Procedure:   · Incision and drainage, perianal abscess    Surgeon: Cindy    Assistant: None    Indications:  · Nice 32-year-old gentleman, who is well, who presented to Dr. Gerard's office yesterday with what he thought was a hemorrhoid, with diagnosis of a perianal abscess.  This began to drain last night.    Findings:   · Area approximately 2 cm x 3.5 cm with necrotic tissue consistent with perianal abscess tissue, extending deeply approximately 3 cm, with no connection noted of a fistulous nature to the rectum or anal canal.  Thrombotic veins within the space    Recommendations:   · One-inch Nu Gauze to be changed daily, by home health, after sitz bath.  This is to be done for 1 week, then DC'd, and the patient is to do sitz baths at least twice a day thereafter until healed.  Order for home health entered.  Discussed with mother  · Anal surgery recommendations will be given  · Continue antibiotics given by Dr. Gerard  · Percocet Rx will be given    Specimens:   · None    EBL: Less than 50 cc    Technique:     Gen. anesthetic was induced, jackknife prone position, rectal area prepped with Betadine and draped sterilely.  Area of necrosis was noted over the central region, and this was excised in elliptical, radial fashion, to the depth noted.  Within the subcutaneous tissue were thrombotic veins.  Probe was used and no communication with the rectum was noted.  Anal retractor was placed so that I could look into the anus and no communication was noted there either.    I debrided the cavity sharply with knife, and cauterized.  We irrigated and then packed with one-inch Nu Gauze.    Denise White MD  04/04/18  2:11 PM

## 2018-04-05 ENCOUNTER — TELEPHONE (OUTPATIENT)
Dept: SURGERY | Facility: CLINIC | Age: 33
End: 2018-04-05

## 2018-04-05 NOTE — DISCHARGE INSTRUCTIONS
****YOU TOOK A PAIN PILL AT 2:39 PM****  YOU CAN ANOTHER PAIN PILL AT 6:39 PM    [facility]  MAIN OFFICE  [phone]

## 2018-04-05 NOTE — TELEPHONE ENCOUNTER
My intention was for the dressing to stop after a week, hoping that he could simply cover with a gauze with tid sitz baths.    MD Mag Aldrich RN with [facility] called the office wanting to clarify on wound dressing orders for the patient. Informed her that the patient should have Nu-Gauze packing done daily after sitz baths for one week. After this, D/C HH and do at least BID sitz baths. RN would like to know if packing should be continued or if the packing also stops after 1 week.

## 2018-04-06 ENCOUNTER — TELEPHONE (OUTPATIENT)
Dept: SURGERY | Facility: CLINIC | Age: 33
End: 2018-04-06

## 2018-04-06 NOTE — TELEPHONE ENCOUNTER
Called Mag back and informed her that the patient's packing should be D/Chirag after 1 week. Apply a bandage and do TID sitz baths. Mag verbalized understanding and will inform the patient.

## 2018-04-06 NOTE — TELEPHONE ENCOUNTER
"Addend    1/2\" nugauze with saline is fine.  Stop the colace.  I was afraid he might get constipated.  If his diarrhea continues, stop the abx.    Denise White MD     04/06/18 Spoke to Carri and called the patient.     [facility] Carri called regarding dressing changes.His mother is a nurse and is his caregiver. She states the dressing is too wide and too dry. Can she change the Nu Guaze to 1/2\" and add saline. She is having to change the current dressing 3 or 4 times a day because it is falling out.     Also, he is having liquid to loose BM's. He is currently taking a stool softener bid and is on an abx.   "

## 2018-04-11 ENCOUNTER — OFFICE VISIT (OUTPATIENT)
Dept: SURGERY | Facility: CLINIC | Age: 33
End: 2018-04-11

## 2018-04-11 DIAGNOSIS — K61.0 PERIANAL ABSCESS: Primary | ICD-10-CM

## 2018-04-11 PROCEDURE — 99024 POSTOP FOLLOW-UP VISIT: CPT | Performed by: SURGERY

## 2018-04-11 NOTE — PATIENT INSTRUCTIONS
Post-Op Anal Surgery    1. The BEST pain relief for anal surgery is a sitz bath (warm tub bath to the anus). This relaxes the anal sphincter and reduces pain, while at the same time aiding with hygiene. Post-op pain is usually the worst 4-7 days after surgery and is not uncommon for 10-14 days, usually subsiding by 14-21 days. The prep that you took before the surgery will assist in making your pain less severe for the first 4 days, while not having bowel movements. Patients with fistulae or fissures will have less pain than those with hemorrhoids.   2. A water bottle with a pull out spout will work well as a rinsing bottle to keep next to the commode to help with hygiene after each bowel movement. Otherwise, a trip to the shower with a hand held or detachable shower nozzle may be necessary. Pat dry with the toilet tissue, anal pads, or towel, rather than rubbing dry.   3. Your dressing can be changed immediately and as often as necessary for hygiene. Remove it if the gauze is saturated to keep the underlying skin from becoming macerated.   4. You may apply an antibiotic ointment, such as polysporin, immediately at the site of surgery. Do not apply in a broad area, as this will lead to skin that is too moist and prone to yeast overgrowth. Keeping the surrounding area dry is the best practice.   5. It is common for your first bowel movement to include a piece of “surgicell”, a small piece of absorbent material that is often placed in the anus to help clotting. It is very common for there to be an exudative, mucous-like discharge from the rectum and bleeding with bowel movements for as long as 2-4 weeks.   6. Usually you will be given a prescription for narcotics after the surgery. Unless you have kidney problems, or are intolerant of non-steroidals, they may be added to your narcotic for pain control, and are often more effective. These are medicines such as ibuprofen, Advil, Motrin, and Aleve. Take as prescribed on  the instructions.   7. Drink lots of water and take a stool softener daily, such as Colace 100 mg, to help avoid constipation. A fiber supplement may be added. If you are getting constipated, add twice daily dosing of milk of magnesia until the first loose bowel movement.   8. Do not drive while taking pain medications.   9. No strenuous activity for 2 weeks.   10. In general, if you have a sedentary job, you can return to work in 7-14 days. If heavy lifting is required, perhaps 3 weeks.   11. If you are given a prescription for narcotics, do not take them on an empty stomach. It is not unusual for the narcotics to induce constipation, so be mindful of the number that are used.

## 2018-04-13 PROBLEM — K61.0 PERIANAL ABSCESS: Status: RESOLVED | Noted: 2018-04-04 | Resolved: 2018-04-13

## 2018-04-13 NOTE — PROGRESS NOTES
SURGERY: MAXINE  Post op Visit  Fazal Tavarez  4/11/18  Fazal comes in, in extremely nice  for [facility],status post incision and drainage, debridement of a perianal abscess on for 418.  This was packed with one-inch Nu Gauze, and since that time, he has been getting dressing changes with his family.  I did suggest that he complete his antibiotics that were given by Dr. Gerard, and they will be gone in a couple of days.    His wound looks great.  There appears to be wonderful healing, and there is an opening of about a centimeter, at least diminished in size by at least two thirds.  We printed outpatient instructions for him for perianal surgery, filled out paperwork for him to return to work Monday after next, he commenting that he has to do a lot of walking at work and is concerned about going back sooner.  If he has any further problems, or any needs, he'll be sure to call us.    Denise White MD

## 2019-04-06 DIAGNOSIS — K21.9 GASTROESOPHAGEAL REFLUX DISEASE WITHOUT ESOPHAGITIS: ICD-10-CM

## 2019-04-09 RX ORDER — OMEPRAZOLE 20 MG/1
20 CAPSULE, DELAYED RELEASE ORAL DAILY
Qty: 30 CAPSULE | Refills: 0 | Status: SHIPPED | OUTPATIENT
Start: 2019-04-09 | End: 2019-05-19 | Stop reason: SDUPTHER

## 2019-05-19 DIAGNOSIS — K21.9 GASTROESOPHAGEAL REFLUX DISEASE WITHOUT ESOPHAGITIS: ICD-10-CM

## 2019-05-21 RX ORDER — OMEPRAZOLE 20 MG/1
CAPSULE, DELAYED RELEASE ORAL
Qty: 15 CAPSULE | Refills: 0 | Status: SHIPPED | OUTPATIENT
Start: 2019-05-21 | End: 2019-07-02 | Stop reason: SDUPTHER

## 2019-05-22 ENCOUNTER — OFFICE VISIT (OUTPATIENT)
Dept: FAMILY MEDICINE CLINIC | Facility: CLINIC | Age: 34
End: 2019-05-22

## 2019-05-22 VITALS
SYSTOLIC BLOOD PRESSURE: 143 MMHG | HEART RATE: 80 BPM | WEIGHT: 315 LBS | DIASTOLIC BLOOD PRESSURE: 91 MMHG | BODY MASS INDEX: 42.66 KG/M2 | TEMPERATURE: 98.4 F | HEIGHT: 72 IN | RESPIRATION RATE: 16 BRPM

## 2019-05-22 DIAGNOSIS — R35.0 URINARY FREQUENCY: Primary | ICD-10-CM

## 2019-05-22 DIAGNOSIS — R73.01 IFG (IMPAIRED FASTING GLUCOSE): ICD-10-CM

## 2019-05-22 PROCEDURE — 99213 OFFICE O/P EST LOW 20 MIN: CPT | Performed by: FAMILY MEDICINE

## 2019-05-22 RX ORDER — CIPROFLOXACIN 500 MG/1
500 TABLET, FILM COATED ORAL 2 TIMES DAILY
Qty: 20 TABLET | Refills: 0 | Status: SHIPPED | OUTPATIENT
Start: 2019-05-22 | End: 2019-05-31 | Stop reason: SDUPTHER

## 2019-05-22 NOTE — PROGRESS NOTES
Subjective   Fazal Tavarez is a 33 y.o. male.     CC: Urinary Frequency    History of Present Illness     Pt comes in today with a roughly 1 week h/o increased urinary frequency and some hesitancy. Drinks a little more Coke recently than prior. No f/c. No polydipsia. Pt reports is not sexually active.     FH: positive DM    The following portions of the patient's history were reviewed and updated as appropriate: allergies, current medications, past family history, past medical history, past social history, past surgical history and problem list.    Review of Systems   Constitutional: Negative for activity change, chills, fatigue and fever.   Respiratory: Negative for cough and shortness of breath.    Cardiovascular: Negative for chest pain and palpitations.   Gastrointestinal: Negative for abdominal pain.   Endocrine: Negative for cold intolerance.   Genitourinary: Positive for frequency.   Psychiatric/Behavioral: Negative for behavioral problems and dysphoric mood. The patient is not nervous/anxious.        Objective   Physical Exam   Constitutional: He appears well-developed and well-nourished.   Neck: Neck supple. No thyromegaly present.   Cardiovascular: Normal rate and regular rhythm.   No murmur heard.  Pulmonary/Chest: Effort normal and breath sounds normal.   Abdominal: Bowel sounds are normal. There is no tenderness.   Psychiatric: He has a normal mood and affect. His behavior is normal.   Nursing note and vitals reviewed.      Assessment/Plan   Fazal was seen today for urinary frequency.    Diagnoses and all orders for this visit:    Urinary frequency  -     Urinalysis With Culture If Indicated - Urine, Clean Catch  -     ciprofloxacin (CIPRO) 500 MG tablet; Take 1 tablet by mouth 2 (Two) Times a Day.    IFG (impaired fasting glucose)  -     Hemoglobin A1c  -     Basic Metabolic Panel    Other orders  -     Cancel: Chlamydia trachomatis, Neisseria gonorrhoeae, PCR - , Urine, Clean Catch

## 2019-05-23 LAB
APPEARANCE UR: CLEAR
BACTERIA #/AREA URNS HPF: NORMAL /HPF
BILIRUB UR QL STRIP: NEGATIVE
BUN SERPL-MCNC: 11 MG/DL (ref 6–20)
BUN/CREAT SERPL: 13.8 (ref 7–25)
CALCIUM SERPL-MCNC: 10.3 MG/DL (ref 8.6–10.5)
CHLORIDE SERPL-SCNC: 102 MMOL/L (ref 98–107)
CO2 SERPL-SCNC: 27.6 MMOL/L (ref 22–29)
COLOR UR: YELLOW
CREAT SERPL-MCNC: 0.8 MG/DL (ref 0.76–1.27)
EPI CELLS #/AREA URNS HPF: NORMAL /HPF
GLUCOSE SERPL-MCNC: 88 MG/DL (ref 65–99)
GLUCOSE UR QL: NEGATIVE
HBA1C MFR BLD: 5.3 % (ref 4.8–5.6)
HGB UR QL STRIP: NEGATIVE
KETONES UR QL STRIP: NEGATIVE
LEUKOCYTE ESTERASE UR QL STRIP: NEGATIVE
MICRO URNS: NORMAL
MICRO URNS: NORMAL
MUCOUS THREADS URNS QL MICRO: PRESENT /HPF
NITRITE UR QL STRIP: NEGATIVE
PH UR STRIP: 7.5 [PH] (ref 5–7.5)
POTASSIUM SERPL-SCNC: 4.7 MMOL/L (ref 3.5–5.2)
PROT UR QL STRIP: NEGATIVE
RBC #/AREA URNS HPF: NORMAL /HPF
SODIUM SERPL-SCNC: 141 MMOL/L (ref 136–145)
SP GR UR: 1.02 (ref 1–1.03)
URINALYSIS REFLEX: NORMAL
UROBILINOGEN UR STRIP-MCNC: 0.2 MG/DL (ref 0.2–1)
WBC #/AREA URNS HPF: NORMAL /HPF

## 2019-05-31 DIAGNOSIS — R35.0 URINARY FREQUENCY: ICD-10-CM

## 2019-05-31 RX ORDER — CIPROFLOXACIN 500 MG/1
500 TABLET, FILM COATED ORAL 2 TIMES DAILY
Qty: 20 TABLET | Refills: 0 | Status: SHIPPED | OUTPATIENT
Start: 2019-05-31 | End: 2020-11-11

## 2019-06-11 ENCOUNTER — TELEPHONE (OUTPATIENT)
Dept: FAMILY MEDICINE CLINIC | Facility: CLINIC | Age: 34
End: 2019-06-11

## 2019-06-11 DIAGNOSIS — R35.0 URINARY FREQUENCY: Primary | ICD-10-CM

## 2019-06-21 DIAGNOSIS — K21.9 GASTROESOPHAGEAL REFLUX DISEASE WITHOUT ESOPHAGITIS: ICD-10-CM

## 2019-06-24 RX ORDER — OMEPRAZOLE 20 MG/1
CAPSULE, DELAYED RELEASE ORAL
Qty: 15 CAPSULE | Refills: 0 | OUTPATIENT
Start: 2019-06-24

## 2019-07-02 DIAGNOSIS — K21.9 GASTROESOPHAGEAL REFLUX DISEASE WITHOUT ESOPHAGITIS: ICD-10-CM

## 2019-07-02 RX ORDER — OMEPRAZOLE 20 MG/1
20 CAPSULE, DELAYED RELEASE ORAL DAILY
Qty: 15 CAPSULE | Refills: 0 | Status: SHIPPED | OUTPATIENT
Start: 2019-07-02 | End: 2019-07-22 | Stop reason: SDUPTHER

## 2019-07-22 DIAGNOSIS — K21.9 GASTROESOPHAGEAL REFLUX DISEASE WITHOUT ESOPHAGITIS: ICD-10-CM

## 2019-07-22 RX ORDER — OMEPRAZOLE 20 MG/1
20 CAPSULE, DELAYED RELEASE ORAL DAILY
Qty: 30 CAPSULE | Refills: 0 | Status: SHIPPED | OUTPATIENT
Start: 2019-07-22 | End: 2019-08-21 | Stop reason: SDUPTHER

## 2019-08-21 DIAGNOSIS — K21.9 GASTROESOPHAGEAL REFLUX DISEASE WITHOUT ESOPHAGITIS: ICD-10-CM

## 2019-08-22 RX ORDER — OMEPRAZOLE 20 MG/1
CAPSULE, DELAYED RELEASE ORAL
Qty: 30 CAPSULE | Refills: 0 | Status: SHIPPED | OUTPATIENT
Start: 2019-08-22 | End: 2019-09-24 | Stop reason: SDUPTHER

## 2019-09-24 DIAGNOSIS — K21.9 GASTROESOPHAGEAL REFLUX DISEASE WITHOUT ESOPHAGITIS: ICD-10-CM

## 2019-09-26 RX ORDER — OMEPRAZOLE 20 MG/1
CAPSULE, DELAYED RELEASE ORAL
Qty: 15 CAPSULE | Refills: 0 | Status: SHIPPED | OUTPATIENT
Start: 2019-09-26 | End: 2019-10-20 | Stop reason: SDUPTHER

## 2019-10-19 DIAGNOSIS — K21.9 GASTROESOPHAGEAL REFLUX DISEASE WITHOUT ESOPHAGITIS: ICD-10-CM

## 2019-10-20 RX ORDER — OMEPRAZOLE 20 MG/1
CAPSULE, DELAYED RELEASE ORAL
Qty: 30 CAPSULE | Refills: 0 | Status: SHIPPED | OUTPATIENT
Start: 2019-10-20 | End: 2019-11-15 | Stop reason: SDUPTHER

## 2019-11-15 DIAGNOSIS — K21.9 GASTROESOPHAGEAL REFLUX DISEASE WITHOUT ESOPHAGITIS: ICD-10-CM

## 2019-11-15 RX ORDER — OMEPRAZOLE 20 MG/1
CAPSULE, DELAYED RELEASE ORAL
Qty: 30 CAPSULE | Refills: 0 | Status: SHIPPED | OUTPATIENT
Start: 2019-11-15 | End: 2019-12-15 | Stop reason: SDUPTHER

## 2019-12-15 DIAGNOSIS — K21.9 GASTROESOPHAGEAL REFLUX DISEASE WITHOUT ESOPHAGITIS: ICD-10-CM

## 2019-12-15 RX ORDER — OMEPRAZOLE 20 MG/1
CAPSULE, DELAYED RELEASE ORAL
Qty: 30 CAPSULE | Refills: 0 | Status: SHIPPED | OUTPATIENT
Start: 2019-12-15 | End: 2020-01-14

## 2020-01-14 DIAGNOSIS — K21.9 GASTROESOPHAGEAL REFLUX DISEASE WITHOUT ESOPHAGITIS: ICD-10-CM

## 2020-01-14 RX ORDER — OMEPRAZOLE 20 MG/1
CAPSULE, DELAYED RELEASE ORAL
Qty: 30 CAPSULE | Refills: 0 | Status: SHIPPED | OUTPATIENT
Start: 2020-01-14 | End: 2020-03-14

## 2020-03-14 DIAGNOSIS — K21.9 GASTROESOPHAGEAL REFLUX DISEASE WITHOUT ESOPHAGITIS: ICD-10-CM

## 2020-03-14 RX ORDER — OMEPRAZOLE 20 MG/1
CAPSULE, DELAYED RELEASE ORAL
Qty: 30 CAPSULE | Refills: 0 | Status: SHIPPED | OUTPATIENT
Start: 2020-03-14 | End: 2020-04-13

## 2020-04-13 DIAGNOSIS — K21.9 GASTROESOPHAGEAL REFLUX DISEASE WITHOUT ESOPHAGITIS: ICD-10-CM

## 2020-04-13 RX ORDER — OMEPRAZOLE 20 MG/1
CAPSULE, DELAYED RELEASE ORAL
Qty: 30 CAPSULE | Refills: 0 | Status: SHIPPED | OUTPATIENT
Start: 2020-04-13 | End: 2020-11-11 | Stop reason: SDUPTHER

## 2020-05-13 DIAGNOSIS — K21.9 GASTROESOPHAGEAL REFLUX DISEASE WITHOUT ESOPHAGITIS: ICD-10-CM

## 2020-05-13 RX ORDER — OMEPRAZOLE 20 MG/1
CAPSULE, DELAYED RELEASE ORAL
Qty: 30 CAPSULE | Refills: 0 | OUTPATIENT
Start: 2020-05-13

## 2020-08-17 DIAGNOSIS — K21.9 GASTROESOPHAGEAL REFLUX DISEASE WITHOUT ESOPHAGITIS: ICD-10-CM

## 2020-08-17 RX ORDER — OMEPRAZOLE 20 MG/1
CAPSULE, DELAYED RELEASE ORAL
Qty: 30 CAPSULE | Refills: 0 | OUTPATIENT
Start: 2020-08-17

## 2020-11-11 ENCOUNTER — OFFICE VISIT (OUTPATIENT)
Dept: FAMILY MEDICINE CLINIC | Facility: CLINIC | Age: 35
End: 2020-11-11

## 2020-11-11 VITALS
HEART RATE: 68 BPM | WEIGHT: 315 LBS | RESPIRATION RATE: 16 BRPM | OXYGEN SATURATION: 97 % | DIASTOLIC BLOOD PRESSURE: 98 MMHG | TEMPERATURE: 97.7 F | BODY MASS INDEX: 40.43 KG/M2 | SYSTOLIC BLOOD PRESSURE: 154 MMHG | HEIGHT: 74 IN

## 2020-11-11 DIAGNOSIS — K21.9 GASTROESOPHAGEAL REFLUX DISEASE WITHOUT ESOPHAGITIS: ICD-10-CM

## 2020-11-11 DIAGNOSIS — I10 ESSENTIAL HYPERTENSION: Primary | ICD-10-CM

## 2020-11-11 LAB
ALBUMIN SERPL-MCNC: 4.5 G/DL (ref 3.5–5.2)
ALBUMIN/GLOB SERPL: 1.8 G/DL
ALP SERPL-CCNC: 83 U/L (ref 39–117)
ALT SERPL-CCNC: 65 U/L (ref 1–41)
AST SERPL-CCNC: 69 U/L (ref 1–40)
BASOPHILS # BLD AUTO: 0.06 10*3/MM3 (ref 0–0.2)
BASOPHILS NFR BLD AUTO: 0.7 % (ref 0–1.5)
BILIRUB SERPL-MCNC: 0.3 MG/DL (ref 0–1.2)
BUN SERPL-MCNC: 11 MG/DL (ref 6–20)
BUN/CREAT SERPL: 11.7 (ref 7–25)
CALCIUM SERPL-MCNC: 9.5 MG/DL (ref 8.6–10.5)
CHLORIDE SERPL-SCNC: 103 MMOL/L (ref 98–107)
CHOLEST SERPL-MCNC: 228 MG/DL (ref 0–200)
CO2 SERPL-SCNC: 26.6 MMOL/L (ref 22–29)
CREAT SERPL-MCNC: 0.94 MG/DL (ref 0.76–1.27)
EOSINOPHIL # BLD AUTO: 0.31 10*3/MM3 (ref 0–0.4)
EOSINOPHIL NFR BLD AUTO: 3.8 % (ref 0.3–6.2)
ERYTHROCYTE [DISTWIDTH] IN BLOOD BY AUTOMATED COUNT: 13.5 % (ref 12.3–15.4)
GLOBULIN SER CALC-MCNC: 2.5 GM/DL
GLUCOSE SERPL-MCNC: 81 MG/DL (ref 65–99)
HCT VFR BLD AUTO: 43.5 % (ref 37.5–51)
HDLC SERPL-MCNC: 47 MG/DL (ref 40–60)
HGB BLD-MCNC: 14.2 G/DL (ref 13–17.7)
IMM GRANULOCYTES # BLD AUTO: 0.03 10*3/MM3 (ref 0–0.05)
IMM GRANULOCYTES NFR BLD AUTO: 0.4 % (ref 0–0.5)
LDLC SERPL CALC-MCNC: 143 MG/DL (ref 0–100)
LYMPHOCYTES # BLD AUTO: 2.98 10*3/MM3 (ref 0.7–3.1)
LYMPHOCYTES NFR BLD AUTO: 36.3 % (ref 19.6–45.3)
MCH RBC QN AUTO: 28.6 PG (ref 26.6–33)
MCHC RBC AUTO-ENTMCNC: 32.6 G/DL (ref 31.5–35.7)
MCV RBC AUTO: 87.5 FL (ref 79–97)
MONOCYTES # BLD AUTO: 0.39 10*3/MM3 (ref 0.1–0.9)
MONOCYTES NFR BLD AUTO: 4.7 % (ref 5–12)
NEUTROPHILS # BLD AUTO: 4.45 10*3/MM3 (ref 1.7–7)
NEUTROPHILS NFR BLD AUTO: 54.1 % (ref 42.7–76)
NRBC BLD AUTO-RTO: 0 /100 WBC (ref 0–0.2)
PLATELET # BLD AUTO: 224 10*3/MM3 (ref 140–450)
POTASSIUM SERPL-SCNC: 4.3 MMOL/L (ref 3.5–5.2)
PROT SERPL-MCNC: 7 G/DL (ref 6–8.5)
RBC # BLD AUTO: 4.97 10*6/MM3 (ref 4.14–5.8)
SODIUM SERPL-SCNC: 138 MMOL/L (ref 136–145)
TRIGL SERPL-MCNC: 211 MG/DL (ref 0–150)
TSH SERPL DL<=0.005 MIU/L-ACNC: 1.71 UIU/ML (ref 0.27–4.2)
VLDLC SERPL CALC-MCNC: 38 MG/DL (ref 5–40)
WBC # BLD AUTO: 8.22 10*3/MM3 (ref 3.4–10.8)

## 2020-11-11 PROCEDURE — 99214 OFFICE O/P EST MOD 30 MIN: CPT | Performed by: NURSE PRACTITIONER

## 2020-11-11 RX ORDER — OMEPRAZOLE 20 MG/1
20 CAPSULE, DELAYED RELEASE ORAL DAILY
Qty: 90 CAPSULE | Refills: 3 | Status: SHIPPED | OUTPATIENT
Start: 2020-11-11 | End: 2021-08-06

## 2020-11-11 RX ORDER — LISINOPRIL 20 MG/1
20 TABLET ORAL DAILY
Qty: 30 TABLET | Refills: 0 | Status: SHIPPED | OUTPATIENT
Start: 2020-11-11 | End: 2020-11-30 | Stop reason: SDUPTHER

## 2020-11-11 NOTE — PROGRESS NOTES
"Subjective   Fazal Tavarez is a 34 y.o. male.     History of Present Illness    Since the last visit, he has overall felt well.  He has New diagnosis today of Essential Hypertension and will start medication and GERD controlled on PPI Rx.  he has been compliant with current medications have reviewed them.  The patient denies medication side effects.  Will refill medications. /98   Pulse 68   Temp 97.7 °F (36.5 °C)   Resp 16   Ht 188 cm (74\")   Wt (!) 160 kg (352 lb)   SpO2 97%   BMI 45.19 kg/m²     Results for orders placed or performed in visit on 05/22/19   Hemoglobin A1c    Specimen: Blood   Result Value Ref Range    Hemoglobin A1C 5.30 4.80 - 5.60 %   Basic Metabolic Panel    Specimen: Blood   Result Value Ref Range    Glucose 88 65 - 99 mg/dL    BUN 11 6 - 20 mg/dL    Creatinine 0.80 0.76 - 1.27 mg/dL    eGFR Non African Am 111 >60 mL/min/1.73    eGFR African Am 134 >60 mL/min/1.73    BUN/Creatinine Ratio 13.8 7.0 - 25.0    Sodium 141 136 - 145 mmol/L    Potassium 4.7 3.5 - 5.2 mmol/L    Chloride 102 98 - 107 mmol/L    Total CO2 27.6 22.0 - 29.0 mmol/L    Calcium 10.3 8.6 - 10.5 mg/dL   Urinalysis With Culture If Indicated -   Result Value Ref Range    Specific Gravity, UA 1.022 1.005 - 1.030    pH, UA 7.5 5.0 - 7.5    Color, UA Yellow Yellow    Appearance, UA Clear Clear    Leukocytes, UA Negative Negative    Protein Negative Negative/Trace    Glucose, UA Negative Negative    Ketones Negative Negative    Blood, UA Negative Negative    Bilirubin, UA Negative Negative    Urobilinogen, UA 0.2 0.2 - 1.0 mg/dL    Nitrite, UA Negative Negative    Microscopic Examination Comment     Microscopic Examination See below:     Urinalysis Reflex Comment    Microscopic Examination -   Result Value Ref Range    WBC, UA 0-5 0 - 5 /hpf    RBC, UA 0-2 0 - 2 /hpf    Epithelial Cells (non renal) None seen 0 - 10 /hpf    Mucus, UA Present Not Estab. /hpf    Bacteria, UA None seen None seen/Few /hpf         The " following portions of the patient's history were reviewed and updated as appropriate: allergies, current medications, past family history, past medical history, past social history, past surgical history and problem list.    Review of Systems   Constitutional: Negative for fatigue.   Respiratory: Negative for cough and shortness of breath.    Cardiovascular: Negative for chest pain and palpitations.   Skin: Negative for rash.   Neurological: Positive for headaches.   Psychiatric/Behavioral: Negative for dysphoric mood and sleep disturbance. The patient is not nervous/anxious.        Objective   Physical Exam  Vitals signs and nursing note reviewed.   Constitutional:       Appearance: He is well-developed.   Neck:      Vascular: No carotid bruit.   Cardiovascular:      Rate and Rhythm: Normal rate and regular rhythm.   Pulmonary:      Effort: Pulmonary effort is normal.      Breath sounds: Normal breath sounds.   Neurological:      Mental Status: He is oriented to person, place, and time.   Psychiatric:         Mood and Affect: Mood normal.         Behavior: Behavior normal.         Thought Content: Thought content normal.         Judgment: Judgment normal.         Assessment/Plan   Diagnoses and all orders for this visit:    1. Essential hypertension (Primary)  -     Comprehensive metabolic panel  -     Lipid panel  -     CBC and Differential  -     TSH  -     lisinopril (PRINIVIL,ZESTRIL) 20 MG tablet; Take 1 tablet by mouth Daily.  Dispense: 30 tablet; Refill: 0    2. Gastroesophageal reflux disease without esophagitis  -     omeprazole (priLOSEC) 20 MG capsule; Take 1 capsule by mouth Daily.  Dispense: 90 capsule; Refill: 3    Other orders  -     Cancel: Tdap Vaccine Greater Than or Equal To 8yo IM          F/u in 2 weeks for recheck.

## 2020-11-30 ENCOUNTER — OFFICE VISIT (OUTPATIENT)
Dept: FAMILY MEDICINE CLINIC | Facility: CLINIC | Age: 35
End: 2020-11-30

## 2020-11-30 VITALS
OXYGEN SATURATION: 96 % | RESPIRATION RATE: 16 BRPM | TEMPERATURE: 97.6 F | DIASTOLIC BLOOD PRESSURE: 80 MMHG | BODY MASS INDEX: 40.43 KG/M2 | WEIGHT: 315 LBS | SYSTOLIC BLOOD PRESSURE: 110 MMHG | HEART RATE: 68 BPM | HEIGHT: 74 IN

## 2020-11-30 DIAGNOSIS — Z23 NEED FOR TDAP VACCINATION: ICD-10-CM

## 2020-11-30 DIAGNOSIS — I10 ESSENTIAL HYPERTENSION: Primary | ICD-10-CM

## 2020-11-30 PROCEDURE — 99212 OFFICE O/P EST SF 10 MIN: CPT | Performed by: NURSE PRACTITIONER

## 2020-11-30 PROCEDURE — 90471 IMMUNIZATION ADMIN: CPT | Performed by: NURSE PRACTITIONER

## 2020-11-30 PROCEDURE — 90715 TDAP VACCINE 7 YRS/> IM: CPT | Performed by: NURSE PRACTITIONER

## 2020-11-30 RX ORDER — LISINOPRIL 20 MG/1
20 TABLET ORAL DAILY
Qty: 90 TABLET | Refills: 1 | Status: SHIPPED | OUTPATIENT
Start: 2020-11-30 | End: 2021-06-03 | Stop reason: SDUPTHER

## 2020-11-30 NOTE — PROGRESS NOTES
Subjective   Fazal Tavarez is a 35 y.o. male.     History of Present Illness   Patient presents to office to f/u on blood pressure. At last visit he was started on lisinopril. Patient states he has been taking as prescribed. He reports feeling well and denies side effects.   The following portions of the patient's history were reviewed and updated as appropriate: allergies, current medications, past family history, past medical history, past social history, past surgical history and problem list.    Review of Systems   Constitutional: Negative for fatigue.   Respiratory: Negative for cough and shortness of breath.    Cardiovascular: Negative for chest pain and palpitations.   Skin: Negative for rash.   Neurological: Negative for dizziness, light-headedness and headaches.   Psychiatric/Behavioral: Negative for dysphoric mood and sleep disturbance. The patient is not nervous/anxious.        Objective   Physical Exam  Vitals signs and nursing note reviewed.   Constitutional:       Appearance: He is well-developed.   Cardiovascular:      Rate and Rhythm: Normal rate and regular rhythm.   Pulmonary:      Effort: Pulmonary effort is normal.      Breath sounds: Normal breath sounds.   Neurological:      Mental Status: He is oriented to person, place, and time.   Psychiatric:         Mood and Affect: Mood normal.         Behavior: Behavior normal.         Thought Content: Thought content normal.         Judgment: Judgment normal.         Assessment/Plan   Diagnoses and all orders for this visit:    1. Essential hypertension (Primary)  -     lisinopril (PRINIVIL,ZESTRIL) 20 MG tablet; Take 1 tablet by mouth Daily.  Dispense: 90 tablet; Refill: 1    2. Need for Tdap vaccination  -     Tdap Vaccine Greater Than or Equal To 8yo IM

## 2021-06-03 ENCOUNTER — OFFICE VISIT (OUTPATIENT)
Dept: FAMILY MEDICINE CLINIC | Facility: CLINIC | Age: 36
End: 2021-06-03

## 2021-06-03 VITALS
RESPIRATION RATE: 16 BRPM | SYSTOLIC BLOOD PRESSURE: 140 MMHG | BODY MASS INDEX: 40.43 KG/M2 | WEIGHT: 315 LBS | HEIGHT: 74 IN | HEART RATE: 86 BPM | OXYGEN SATURATION: 95 % | TEMPERATURE: 97.9 F | DIASTOLIC BLOOD PRESSURE: 90 MMHG

## 2021-06-03 DIAGNOSIS — I10 ESSENTIAL HYPERTENSION: ICD-10-CM

## 2021-06-03 DIAGNOSIS — K21.9 GASTROESOPHAGEAL REFLUX DISEASE WITHOUT ESOPHAGITIS: ICD-10-CM

## 2021-06-03 PROCEDURE — 99213 OFFICE O/P EST LOW 20 MIN: CPT | Performed by: NURSE PRACTITIONER

## 2021-06-03 RX ORDER — LISINOPRIL 20 MG/1
20 TABLET ORAL DAILY
Qty: 90 TABLET | Refills: 1 | Status: SHIPPED | OUTPATIENT
Start: 2021-06-03 | End: 2021-09-02 | Stop reason: SINTOL

## 2021-06-03 NOTE — PROGRESS NOTES
"Subjective   Fazal Tavarez is a 35 y.o. male.     History of Present Illness    Since the last visit, he has overall felt well.  He has Essential Hypertension and well controlled on current medication and GERD controlled on PPI Rx.  he has been compliant with current medications have reviewed them.  The patient denies medication side effects.  Will refill medications. /90   Pulse 86   Temp 97.9 °F (36.6 °C)   Resp 16   Ht 188 cm (74\")   Wt (!) 161 kg (355 lb)   SpO2 95%   BMI 45.58 kg/m²     Results for orders placed or performed in visit on 11/11/20   Comprehensive metabolic panel    Specimen: Blood   Result Value Ref Range    Glucose 81 65 - 99 mg/dL    BUN 11 6 - 20 mg/dL    Creatinine 0.94 0.76 - 1.27 mg/dL    eGFR Non African Am 91 >60 mL/min/1.73    eGFR African Am 111 >60 mL/min/1.73    BUN/Creatinine Ratio 11.7 7.0 - 25.0    Sodium 138 136 - 145 mmol/L    Potassium 4.3 3.5 - 5.2 mmol/L    Chloride 103 98 - 107 mmol/L    Total CO2 26.6 22.0 - 29.0 mmol/L    Calcium 9.5 8.6 - 10.5 mg/dL    Total Protein 7.0 6.0 - 8.5 g/dL    Albumin 4.50 3.50 - 5.20 g/dL    Globulin 2.5 gm/dL    A/G Ratio 1.8 g/dL    Total Bilirubin 0.3 0.0 - 1.2 mg/dL    Alkaline Phosphatase 83 39 - 117 U/L    AST (SGOT) 69 (H) 1 - 40 U/L    ALT (SGPT) 65 (H) 1 - 41 U/L   Lipid panel    Specimen: Blood   Result Value Ref Range    Total Cholesterol 228 (H) 0 - 200 mg/dL    Triglycerides 211 (H) 0 - 150 mg/dL    HDL Cholesterol 47 40 - 60 mg/dL    VLDL Cholesterol Jeffrey 38 5 - 40 mg/dL    LDL Chol Calc (NIH) 143 (H) 0 - 100 mg/dL   TSH    Specimen: Blood   Result Value Ref Range    TSH 1.710 0.270 - 4.200 uIU/mL   CBC and Differential    Specimen: Blood   Result Value Ref Range    WBC 8.22 3.40 - 10.80 10*3/mm3    RBC 4.97 4.14 - 5.80 10*6/mm3    Hemoglobin 14.2 13.0 - 17.7 g/dL    Hematocrit 43.5 37.5 - 51.0 %    MCV 87.5 79.0 - 97.0 fL    MCH 28.6 26.6 - 33.0 pg    MCHC 32.6 31.5 - 35.7 g/dL    RDW 13.5 12.3 - 15.4 %    " Platelets 224 140 - 450 10*3/mm3    Neutrophil Rel % 54.1 42.7 - 76.0 %    Lymphocyte Rel % 36.3 19.6 - 45.3 %    Monocyte Rel % 4.7 (L) 5.0 - 12.0 %    Eosinophil Rel % 3.8 0.3 - 6.2 %    Basophil Rel % 0.7 0.0 - 1.5 %    Neutrophils Absolute 4.45 1.70 - 7.00 10*3/mm3    Lymphocytes Absolute 2.98 0.70 - 3.10 10*3/mm3    Monocytes Absolute 0.39 0.10 - 0.90 10*3/mm3    Eosinophils Absolute 0.31 0.00 - 0.40 10*3/mm3    Basophils Absolute 0.06 0.00 - 0.20 10*3/mm3    Immature Granulocyte Rel % 0.4 0.0 - 0.5 %    Immature Grans Absolute 0.03 0.00 - 0.05 10*3/mm3    nRBC 0.0 0.0 - 0.2 /100 WBC         The following portions of the patient's history were reviewed and updated as appropriate: allergies, current medications, past family history, past medical history, past social history, past surgical history and problem list.    Review of Systems   Constitutional: Negative for fatigue.   Respiratory: Negative for cough and shortness of breath.    Cardiovascular: Negative for chest pain and palpitations.   Endocrine: Negative for cold intolerance, heat intolerance, polydipsia, polyphagia and polyuria.   Skin: Negative for rash.   Psychiatric/Behavioral: Negative for dysphoric mood and sleep disturbance. The patient is not nervous/anxious.        Objective   Physical Exam  Vitals and nursing note reviewed.   Constitutional:       Appearance: Normal appearance. He is well-developed.   Neck:      Vascular: No carotid bruit.   Cardiovascular:      Rate and Rhythm: Normal rate and regular rhythm.   Pulmonary:      Effort: Pulmonary effort is normal.      Breath sounds: Normal breath sounds.   Skin:     General: Skin is warm and dry.   Neurological:      Mental Status: He is alert and oriented to person, place, and time.   Psychiatric:         Mood and Affect: Mood normal.         Behavior: Behavior normal.         Thought Content: Thought content normal.         Judgment: Judgment normal.         Assessment/Plan   Diagnoses and  all orders for this visit:    1. Gastroesophageal reflux disease without esophagitis    2. Essential hypertension  -     lisinopril (PRINIVIL,ZESTRIL) 20 MG tablet; Take 1 tablet by mouth Daily.  Dispense: 90 tablet; Refill: 1  -     Comprehensive metabolic panel  -     Lipid panel  -     CBC and Differential

## 2021-06-04 LAB
ALBUMIN SERPL-MCNC: 4.8 G/DL (ref 3.5–5.2)
ALBUMIN/GLOB SERPL: 2.2 G/DL
ALP SERPL-CCNC: 87 U/L (ref 39–117)
ALT SERPL-CCNC: 90 U/L (ref 1–41)
AST SERPL-CCNC: 94 U/L (ref 1–40)
BASOPHILS # BLD AUTO: 0.04 10*3/MM3 (ref 0–0.2)
BASOPHILS NFR BLD AUTO: 0.6 % (ref 0–1.5)
BILIRUB SERPL-MCNC: 0.3 MG/DL (ref 0–1.2)
BUN SERPL-MCNC: 13 MG/DL (ref 6–20)
BUN/CREAT SERPL: 15.7 (ref 7–25)
CALCIUM SERPL-MCNC: 9.9 MG/DL (ref 8.6–10.5)
CHLORIDE SERPL-SCNC: 103 MMOL/L (ref 98–107)
CHOLEST SERPL-MCNC: 224 MG/DL (ref 0–200)
CO2 SERPL-SCNC: 27 MMOL/L (ref 22–29)
CREAT SERPL-MCNC: 0.83 MG/DL (ref 0.76–1.27)
EOSINOPHIL # BLD AUTO: 0.21 10*3/MM3 (ref 0–0.4)
EOSINOPHIL NFR BLD AUTO: 3.2 % (ref 0.3–6.2)
ERYTHROCYTE [DISTWIDTH] IN BLOOD BY AUTOMATED COUNT: 13.6 % (ref 12.3–15.4)
GLOBULIN SER CALC-MCNC: 2.2 GM/DL
GLUCOSE SERPL-MCNC: 94 MG/DL (ref 65–99)
HCT VFR BLD AUTO: 41.3 % (ref 37.5–51)
HDLC SERPL-MCNC: 51 MG/DL (ref 40–60)
HGB BLD-MCNC: 14 G/DL (ref 13–17.7)
IMM GRANULOCYTES # BLD AUTO: 0.03 10*3/MM3 (ref 0–0.05)
IMM GRANULOCYTES NFR BLD AUTO: 0.5 % (ref 0–0.5)
LDLC SERPL CALC-MCNC: 138 MG/DL (ref 0–100)
LYMPHOCYTES # BLD AUTO: 2.36 10*3/MM3 (ref 0.7–3.1)
LYMPHOCYTES NFR BLD AUTO: 35.8 % (ref 19.6–45.3)
MCH RBC QN AUTO: 29.2 PG (ref 26.6–33)
MCHC RBC AUTO-ENTMCNC: 33.9 G/DL (ref 31.5–35.7)
MCV RBC AUTO: 86 FL (ref 79–97)
MONOCYTES # BLD AUTO: 0.31 10*3/MM3 (ref 0.1–0.9)
MONOCYTES NFR BLD AUTO: 4.7 % (ref 5–12)
NEUTROPHILS # BLD AUTO: 3.65 10*3/MM3 (ref 1.7–7)
NEUTROPHILS NFR BLD AUTO: 55.2 % (ref 42.7–76)
NRBC BLD AUTO-RTO: 0 /100 WBC (ref 0–0.2)
PLATELET # BLD AUTO: 202 10*3/MM3 (ref 140–450)
POTASSIUM SERPL-SCNC: 4.7 MMOL/L (ref 3.5–5.2)
PROT SERPL-MCNC: 7 G/DL (ref 6–8.5)
RBC # BLD AUTO: 4.8 10*6/MM3 (ref 4.14–5.8)
SODIUM SERPL-SCNC: 140 MMOL/L (ref 136–145)
TRIGL SERPL-MCNC: 195 MG/DL (ref 0–150)
VLDLC SERPL CALC-MCNC: 35 MG/DL (ref 5–40)
WBC # BLD AUTO: 6.6 10*3/MM3 (ref 3.4–10.8)

## 2021-06-11 ENCOUNTER — TELEPHONE (OUTPATIENT)
Dept: FAMILY MEDICINE CLINIC | Facility: CLINIC | Age: 36
End: 2021-06-11

## 2021-06-11 DIAGNOSIS — R74.8 ELEVATED LIVER ENZYMES: Primary | ICD-10-CM

## 2021-06-11 NOTE — TELEPHONE ENCOUNTER
----- Message from MELODIE Ponce sent at 6/7/2021  5:17 PM EDT -----  Liver enzymes continue to elevated.  Need to reduce ETOH and Tylenol use if applicable.  Need to order hepatitis panel and GGT as well as liver ultrasound for further evaluation.

## 2021-06-17 ENCOUNTER — HOSPITAL ENCOUNTER (OUTPATIENT)
Dept: ULTRASOUND IMAGING | Facility: HOSPITAL | Age: 36
Discharge: HOME | End: 2021-06-17

## 2021-06-17 ENCOUNTER — LAB (OUTPATIENT)
Dept: LAB | Facility: HOSPITAL | Age: 36
End: 2021-06-17

## 2021-06-17 DIAGNOSIS — R74.8 ELEVATED LIVER ENZYMES: ICD-10-CM

## 2021-06-17 PROCEDURE — 80074 ACUTE HEPATITIS PANEL: CPT | Performed by: NURSE PRACTITIONER

## 2021-06-17 PROCEDURE — 76705 ECHO EXAM OF ABDOMEN: CPT

## 2021-06-17 PROCEDURE — 82977 ASSAY OF GGT: CPT | Performed by: NURSE PRACTITIONER

## 2021-08-03 ENCOUNTER — TELEPHONE (OUTPATIENT)
Dept: FAMILY MEDICINE CLINIC | Facility: CLINIC | Age: 36
End: 2021-08-03

## 2021-08-03 DIAGNOSIS — K21.9 GASTROESOPHAGEAL REFLUX DISEASE WITHOUT ESOPHAGITIS: ICD-10-CM

## 2021-08-03 RX ORDER — OMEPRAZOLE 20 MG/1
20 CAPSULE, DELAYED RELEASE ORAL DAILY
Qty: 90 CAPSULE | Refills: 3 | Status: CANCELLED | OUTPATIENT
Start: 2021-08-03

## 2021-08-03 NOTE — TELEPHONE ENCOUNTER
Caller: Fazal Tavarez    Relationship: Self    Best call back number: [phone]    What is the best time to reach you: ANY     Who are you requesting to speak with (clinical staff, provider,  specific staff member): CLINICAL STAFF     What was the call regarding: PHARMACY IS NOT WANTING TO FILL PRESCRIPTION OF OMEPRAZOLE SAYING HE HAS HIT HIS QUOTA FOR THE YEAR AND NEEDS APPROVAL FROM [facility] OFFICE     Do you require a callback: YES

## 2021-08-03 NOTE — TELEPHONE ENCOUNTER
Hub staff attempted to follow warm transfer process and was unsuccessful     Caller: Fazal Tavarez    Relationship to patient: Self    Best call back number: [phone] (H)      Patient is needing: PATIENT STATES SPOKE WITH PHARMACY AND IN ORDER TO RECEIVE A 90 DAY  SUPPLY OF omeprazole (priLOSEC) 20 MG capsule  PATIENT WILL NEED A PRIOR AUTHORIZATION

## 2021-08-04 DIAGNOSIS — K21.9 GASTROESOPHAGEAL REFLUX DISEASE WITHOUT ESOPHAGITIS: ICD-10-CM

## 2021-08-06 RX ORDER — OMEPRAZOLE 20 MG/1
20 CAPSULE, DELAYED RELEASE ORAL DAILY
Qty: 90 CAPSULE | Refills: 3 | Status: SHIPPED | OUTPATIENT
Start: 2021-08-06 | End: 2021-12-15 | Stop reason: SDUPTHER

## 2021-09-02 ENCOUNTER — TELEPHONE (OUTPATIENT)
Dept: FAMILY MEDICINE CLINIC | Facility: CLINIC | Age: 36
End: 2021-09-02

## 2021-09-02 ENCOUNTER — E-VISIT (OUTPATIENT)
Dept: FAMILY MEDICINE CLINIC | Facility: CLINIC | Age: 36
End: 2021-09-02

## 2021-09-02 DIAGNOSIS — I10 ESSENTIAL HYPERTENSION: ICD-10-CM

## 2021-09-02 RX ORDER — LOSARTAN POTASSIUM 50 MG/1
50 TABLET ORAL DAILY
Qty: 30 TABLET | Refills: 1 | Status: SHIPPED | OUTPATIENT
Start: 2021-09-02 | End: 2021-10-25

## 2021-09-02 NOTE — TELEPHONE ENCOUNTER
Periodically through the day I get a bad tickle in the back of my throat  Causing me to cough, After talking to my mom she said she took the same BP medication I am currently on an caused her to cough to,  just wondering if their is a different medication I can try an see if the cough goes away ?  This has been going on for months now an just lara realizing that could b causing it.

## 2021-09-03 RX ORDER — LOSARTAN POTASSIUM 50 MG/1
50 TABLET ORAL DAILY
Qty: 90 TABLET | OUTPATIENT
Start: 2021-09-03

## 2021-09-13 ENCOUNTER — OFFICE VISIT (OUTPATIENT)
Dept: FAMILY MEDICINE CLINIC | Facility: CLINIC | Age: 36
End: 2021-09-13

## 2021-09-13 VITALS
HEIGHT: 74 IN | HEART RATE: 63 BPM | BODY MASS INDEX: 40.43 KG/M2 | SYSTOLIC BLOOD PRESSURE: 132 MMHG | WEIGHT: 315 LBS | OXYGEN SATURATION: 96 % | TEMPERATURE: 97.8 F | DIASTOLIC BLOOD PRESSURE: 83 MMHG

## 2021-09-13 DIAGNOSIS — R74.8 ELEVATED LIVER ENZYMES: ICD-10-CM

## 2021-09-13 DIAGNOSIS — I10 ESSENTIAL HYPERTENSION: Primary | ICD-10-CM

## 2021-09-13 PROCEDURE — 99213 OFFICE O/P EST LOW 20 MIN: CPT | Performed by: NURSE PRACTITIONER

## 2021-09-13 NOTE — PROGRESS NOTES
"Subjective   Fazal Tavarez is a 35 y.o. male.     History of Present Illness    Since the last visit, he has overall felt well.  He has Essential Hypertension and well controlled on current medication.  He reports cough has improved since changing from lisinopril to losartan. He has not been checking BP at home.   he has been compliant with current medications have reviewed them.  The patient denies medication side effects.  Will refill medications. /83   Pulse 63   Temp 97.8 °F (36.6 °C)   Ht 188 cm (74.02\")   Wt (!) 162 kg (357 lb)   SpO2 96%   BMI 45.82 kg/m²     Results for orders placed or performed in visit on 06/16/21   Hepatitis panel, acute    Specimen: Blood   Result Value Ref Range    Hepatitis B Surface Ag Non-Reactive Non-Reactive    Hep A IgM Non-Reactive Non-Reactive    Hep B C IgM Non-Reactive Non-Reactive    Hepatitis C Ab Non-Reactive Non-Reactive   Gamma GT    Specimen: Blood   Result Value Ref Range    GGT 80 (H) 8 - 61 U/L       Liver enzymes elevated with previous lab set.  Patient is fasting and will recheck today.   The following portions of the patient's history were reviewed and updated as appropriate: allergies, current medications, past family history, past medical history, past social history, past surgical history and problem list.    Review of Systems   Constitutional: Negative for fatigue.   Respiratory: Negative for cough and shortness of breath.    Cardiovascular: Negative for chest pain and palpitations.   Skin: Negative for rash.   Psychiatric/Behavioral: Negative for dysphoric mood and sleep disturbance. The patient is not nervous/anxious.        Objective   Physical Exam  Vitals and nursing note reviewed.   Constitutional:       Appearance: He is well-developed.   Cardiovascular:      Rate and Rhythm: Normal rate.   Pulmonary:      Effort: Pulmonary effort is normal.   Neurological:      Mental Status: He is oriented to person, place, and time.   Psychiatric:    "      Mood and Affect: Mood normal.         Behavior: Behavior normal.         Thought Content: Thought content normal.         Judgment: Judgment normal.         Assessment/Plan   Diagnoses and all orders for this visit:    1. Essential hypertension (Primary)    2. Elevated liver enzymes  -     Comprehensive metabolic panel

## 2021-09-14 LAB
ALBUMIN SERPL-MCNC: 4.9 G/DL (ref 3.5–5.2)
ALBUMIN/GLOB SERPL: 2.2 G/DL
ALP SERPL-CCNC: 88 U/L (ref 39–117)
ALT SERPL-CCNC: 83 U/L (ref 1–41)
AST SERPL-CCNC: 69 U/L (ref 1–40)
BILIRUB SERPL-MCNC: 0.2 MG/DL (ref 0–1.2)
BUN SERPL-MCNC: 13 MG/DL (ref 6–20)
BUN/CREAT SERPL: 16 (ref 7–25)
CALCIUM SERPL-MCNC: 10 MG/DL (ref 8.6–10.5)
CHLORIDE SERPL-SCNC: 102 MMOL/L (ref 98–107)
CO2 SERPL-SCNC: 26 MMOL/L (ref 22–29)
CREAT SERPL-MCNC: 0.81 MG/DL (ref 0.76–1.27)
GLOBULIN SER CALC-MCNC: 2.2 GM/DL
GLUCOSE SERPL-MCNC: 106 MG/DL (ref 65–99)
POTASSIUM SERPL-SCNC: 4.7 MMOL/L (ref 3.5–5.2)
PROT SERPL-MCNC: 7.1 G/DL (ref 6–8.5)
SODIUM SERPL-SCNC: 140 MMOL/L (ref 136–145)

## 2021-10-25 RX ORDER — LOSARTAN POTASSIUM 50 MG/1
50 TABLET ORAL DAILY
Qty: 90 TABLET | Refills: 1 | Status: SHIPPED | OUTPATIENT
Start: 2021-10-25 | End: 2022-04-05 | Stop reason: SDUPTHER

## 2021-12-10 ENCOUNTER — TELEPHONE (OUTPATIENT)
Dept: FAMILY MEDICINE CLINIC | Facility: CLINIC | Age: 36
End: 2021-12-10

## 2021-12-10 DIAGNOSIS — K21.9 GASTROESOPHAGEAL REFLUX DISEASE WITHOUT ESOPHAGITIS: ICD-10-CM

## 2021-12-10 RX ORDER — OMEPRAZOLE 20 MG/1
20 CAPSULE, DELAYED RELEASE ORAL DAILY
Qty: 90 CAPSULE | Refills: 3 | OUTPATIENT
Start: 2021-12-10

## 2021-12-10 NOTE — TELEPHONE ENCOUNTER
PATIENT IS CALLING IN HE IS AT THE END OF HIS QUARANTINE FOR HIS COVID AND STATES THAT EVERYTHING IS PRETTY GOOD, BUT HE STATES THAT WHEN HE TAKES DEEP BREATH IN HE HAS A PAIN IN HIS CHEST. WHEN HE IS JUST BREATHING REGULAR IT IS FINE JUST WHEN TRYING TO GET THAT DEEP BREATH.      HE IS NOT SURE WHAT NEEDS TO BE DONE FOR THIS.    PLEASE ADVISE    CALLBACK NUMBER IS  [phone]

## 2021-12-10 NOTE — TELEPHONE ENCOUNTER
Caller: Fazal Tavarez    Relationship: Self    Best call back number: [phone]     Requested Prescriptions:   Requested Prescriptions     Pending Prescriptions Disp Refills   • omeprazole (priLOSEC) 20 MG capsule 90 capsule 3     Sig: Take 1 capsule by mouth Daily.        Pharmacy where request should be sent: [id number] [place], [place] - [address] AT [facility] - [phone] PH - [phone] FX     Additional details provided by patient: HAS ABOUT 4 DAYS    Does the patient have less than a 3 day supply:  [] Yes  [x] No    Federica Hobbs, PCT   12/10/21 09:36 EST

## 2021-12-15 ENCOUNTER — OFFICE VISIT (OUTPATIENT)
Dept: FAMILY MEDICINE CLINIC | Facility: CLINIC | Age: 36
End: 2021-12-15

## 2021-12-15 VITALS
DIASTOLIC BLOOD PRESSURE: 80 MMHG | OXYGEN SATURATION: 97 % | HEART RATE: 75 BPM | SYSTOLIC BLOOD PRESSURE: 120 MMHG | HEIGHT: 74 IN | BODY MASS INDEX: 40.43 KG/M2 | WEIGHT: 315 LBS | TEMPERATURE: 97.4 F | RESPIRATION RATE: 16 BRPM

## 2021-12-15 DIAGNOSIS — U07.1 COVID-19: Primary | ICD-10-CM

## 2021-12-15 DIAGNOSIS — K21.9 GASTROESOPHAGEAL REFLUX DISEASE WITHOUT ESOPHAGITIS: ICD-10-CM

## 2021-12-15 PROCEDURE — 99213 OFFICE O/P EST LOW 20 MIN: CPT | Performed by: NURSE PRACTITIONER

## 2021-12-15 RX ORDER — PREDNISONE 20 MG/1
20 TABLET ORAL DAILY
Qty: 7 TABLET | Refills: 0 | Status: SHIPPED | OUTPATIENT
Start: 2021-12-15 | End: 2022-04-05

## 2021-12-15 RX ORDER — OMEPRAZOLE 20 MG/1
20 CAPSULE, DELAYED RELEASE ORAL DAILY
Qty: 90 CAPSULE | Refills: 1 | Status: SHIPPED | OUTPATIENT
Start: 2021-12-15 | End: 2022-04-05 | Stop reason: SDUPTHER

## 2021-12-15 NOTE — PROGRESS NOTES
Subjective   Fazal Tavarez is a 36 y.o. male.     History of Present Illness   Patient presents to office to f/u on COVID.  He reports feeling significantly improved.  He still has some lingering head congestion and post nasal drainage at times.  He states it is mostly clear.  He denies fever, chills, shortness of breath.  Sill some mild cough.  He reports some soreness to his lateral abdominal area from coughing.   He also reports some central chest pain at the end of inspiration but states that has actually improved since he made appt and is nearly resolved.        [facility] told him that he needs authorization for anymore refills on omeprazole even though it was sent for a years worth in August.   The following portions of the patient's history were reviewed and updated as appropriate: allergies, current medications, past family history, past medical history, past social history, past surgical history and problem list.    Review of Systems   Constitutional: Negative for chills, fatigue and fever.   HENT: Positive for congestion, postnasal drip and sinus pressure.    Respiratory: Negative for cough, chest tightness, shortness of breath and wheezing.    Cardiovascular: Positive for chest pain. Negative for palpitations and leg swelling.   Skin: Negative for rash.   Psychiatric/Behavioral: Negative for dysphoric mood and sleep disturbance. The patient is not nervous/anxious.        Objective   Physical Exam  Vitals and nursing note reviewed.   Constitutional:       Appearance: He is well-developed.   Cardiovascular:      Rate and Rhythm: Normal rate and regular rhythm.   Pulmonary:      Effort: Pulmonary effort is normal.      Breath sounds: Normal breath sounds.   Neurological:      Mental Status: He is alert and oriented to person, place, and time.   Psychiatric:         Mood and Affect: Mood normal.         Behavior: Behavior normal.         Thought Content: Thought content normal.         Judgment:  Judgment normal.         Assessment/Plan   Diagnoses and all orders for this visit:    1. COVID-19 (Primary)  Comments:  improved  Orders:  -     predniSONE (DELTASONE) 20 MG tablet; Take 1 tablet by mouth Daily.  Dispense: 7 tablet; Refill: 0    2. Gastroesophageal reflux disease without esophagitis  -     omeprazole (priLOSEC) 20 MG capsule; Take 1 capsule by mouth Daily.  Dispense: 90 capsule; Refill: 1      Discussed that if central chest pain recurs, will need to order CTA chest. Patient verbalized understanding.

## 2022-04-05 ENCOUNTER — TELEMEDICINE (OUTPATIENT)
Dept: FAMILY MEDICINE CLINIC | Facility: CLINIC | Age: 37
End: 2022-04-05

## 2022-04-05 VITALS — HEIGHT: 74 IN | WEIGHT: 315 LBS | BODY MASS INDEX: 40.43 KG/M2

## 2022-04-05 DIAGNOSIS — K21.9 GASTROESOPHAGEAL REFLUX DISEASE WITHOUT ESOPHAGITIS: ICD-10-CM

## 2022-04-05 DIAGNOSIS — J06.9 ACUTE URI: Primary | ICD-10-CM

## 2022-04-05 DIAGNOSIS — I10 ESSENTIAL HYPERTENSION: ICD-10-CM

## 2022-04-05 PROCEDURE — 99214 OFFICE O/P EST MOD 30 MIN: CPT | Performed by: FAMILY MEDICINE

## 2022-04-05 RX ORDER — METHYLPREDNISOLONE 4 MG/1
TABLET ORAL
Qty: 21 TABLET | Refills: 0 | Status: SHIPPED | OUTPATIENT
Start: 2022-04-05 | End: 2022-05-31

## 2022-04-05 RX ORDER — OMEPRAZOLE 20 MG/1
20 CAPSULE, DELAYED RELEASE ORAL DAILY
Qty: 90 CAPSULE | Refills: 1 | OUTPATIENT
Start: 2022-04-05

## 2022-04-05 RX ORDER — AMOXICILLIN AND CLAVULANATE POTASSIUM 875; 125 MG/1; MG/1
1 TABLET, FILM COATED ORAL EVERY 12 HOURS SCHEDULED
Qty: 20 TABLET | Refills: 0 | Status: SHIPPED | OUTPATIENT
Start: 2022-04-05 | End: 2022-05-31

## 2022-04-05 RX ORDER — LOSARTAN POTASSIUM 50 MG/1
50 TABLET ORAL DAILY
Qty: 90 TABLET | Refills: 1 | Status: SHIPPED | OUTPATIENT
Start: 2022-04-05 | End: 2022-10-03

## 2022-04-05 RX ORDER — OMEPRAZOLE 20 MG/1
20 CAPSULE, DELAYED RELEASE ORAL DAILY
Qty: 90 CAPSULE | Refills: 1 | Status: SHIPPED | OUTPATIENT
Start: 2022-04-05 | End: 2022-10-03

## 2022-04-05 RX ORDER — LOSARTAN POTASSIUM 50 MG/1
50 TABLET ORAL DAILY
Qty: 90 TABLET | Refills: 1 | OUTPATIENT
Start: 2022-04-05

## 2022-04-05 NOTE — PROGRESS NOTES
"Subjective   Fazal Tavarez is a 36 y.o. male.     CC: Video Visit for Medical Management    History of Present Illness     Chief Complaint:   Chief Complaint   Patient presents with   • Nasal Congestion     Video visit /   • Sinusitis   • Cough   • Sore Throat   • Hypertension   • Heartburn       Fazal Tavarez 36 y.o. male who presents today for Medical Management of the below listed issues. He  has a problem list of   Patient Active Problem List   Diagnosis   • Calcaneal spur of foot   • DDD (degenerative disc disease), lumbar   • Essential hypertension   • Gastroesophageal reflux disease without esophagitis   .  Since the last visit, He has overall felt well until a few days ago when he developed some green sputum, cough/congestion and some ST. Mild sinus pressure reported. Pt also uses Zyrtec and Nasacort daily. No f/c. he has been compliant with   Current Outpatient Medications:   •  losartan (COZAAR) 50 MG tablet, Take 1 tablet by mouth Daily., Disp: 90 tablet, Rfl: 1  •  omeprazole (priLOSEC) 20 MG capsule, Take 1 capsule by mouth Daily., Disp: 90 capsule, Rfl: 1  •  amoxicillin-clavulanate (Augmentin) 875-125 MG per tablet, Take 1 tablet by mouth Every 12 (Twelve) Hours., Disp: 20 tablet, Rfl: 0  •  cetirizine (zyrTEC) 10 MG tablet, Take 10 mg by mouth Daily., Disp: , Rfl:   •  methylPREDNISolone (Medrol) 4 MG dose pack, follow package directions, Disp: 21 tablet, Rfl: 0.  He denies medication side effects.    All of the other chronic condition(s) listed above are stable w/o issues.    Ht 188 cm (74.02\")   Wt (!) 165 kg (364 lb)   BMI 46.71 kg/m²     Results for orders placed or performed in visit on 09/13/21   Comprehensive metabolic panel    Specimen: Blood   Result Value Ref Range    Glucose 106 (H) 65 - 99 mg/dL    BUN 13 6 - 20 mg/dL    Creatinine 0.81 0.76 - 1.27 mg/dL    eGFR Non African Am 108 >60 mL/min/1.73    eGFR African Am 131 >60 mL/min/1.73    BUN/Creatinine Ratio 16.0 7.0 - 25.0    " Sodium 140 136 - 145 mmol/L    Potassium 4.7 3.5 - 5.2 mmol/L    Chloride 102 98 - 107 mmol/L    Total CO2 26.0 22.0 - 29.0 mmol/L    Calcium 10.0 8.6 - 10.5 mg/dL    Total Protein 7.1 6.0 - 8.5 g/dL    Albumin 4.90 3.50 - 5.20 g/dL    Globulin 2.2 gm/dL    A/G Ratio 2.2 g/dL    Total Bilirubin 0.2 0.0 - 1.2 mg/dL    Alkaline Phosphatase 88 39 - 117 U/L    AST (SGOT) 69 (H) 1 - 40 U/L    ALT (SGPT) 83 (H) 1 - 41 U/L             The following portions of the patient's history were reviewed and updated as appropriate: allergies, current medications, past family history, past medical history, past social history, past surgical history, and problem list.    Review of Systems   Constitutional: Negative for activity change, chills and fever.   HENT: Positive for congestion and sore throat.    Respiratory: Positive for cough.    Cardiovascular: Negative for chest pain.   Psychiatric/Behavioral: Negative for dysphoric mood.       Objective   Physical Exam  Constitutional:       General: He is not in acute distress.     Appearance: He is well-developed.   Pulmonary:      Effort: Pulmonary effort is normal.   Neurological:      Mental Status: He is alert and oriented to person, place, and time.   Psychiatric:         Behavior: Behavior normal.         Thought Content: Thought content normal.           Assessment/Plan   Diagnoses and all orders for this visit:    1. Acute URI (Primary)  -     methylPREDNISolone (Medrol) 4 MG dose pack; follow package directions  Dispense: 21 tablet; Refill: 0  -     amoxicillin-clavulanate (Augmentin) 875-125 MG per tablet; Take 1 tablet by mouth Every 12 (Twelve) Hours.  Dispense: 20 tablet; Refill: 0    2. Gastroesophageal reflux disease without esophagitis  -     omeprazole (priLOSEC) 20 MG capsule; Take 1 capsule by mouth Daily.  Dispense: 90 capsule; Refill: 1    3. Essential hypertension  -     losartan (COZAAR) 50 MG tablet; Take 1 tablet by mouth Daily.  Dispense: 90 tablet; Refill:  1    Spent  12   minutes with chart and interview and consent for this encounter given by the patient.  You have chosen to receive care through a telehealth visit.  Do you consent to use a video/audio connection for your medical care today? Yes

## 2022-05-31 ENCOUNTER — TELEPHONE (OUTPATIENT)
Dept: FAMILY MEDICINE CLINIC | Facility: CLINIC | Age: 37
End: 2022-05-31

## 2022-05-31 ENCOUNTER — OFFICE VISIT (OUTPATIENT)
Dept: FAMILY MEDICINE CLINIC | Facility: CLINIC | Age: 37
End: 2022-05-31

## 2022-05-31 VITALS
DIASTOLIC BLOOD PRESSURE: 64 MMHG | TEMPERATURE: 98.1 F | WEIGHT: 315 LBS | HEIGHT: 74 IN | OXYGEN SATURATION: 96 % | SYSTOLIC BLOOD PRESSURE: 141 MMHG | HEART RATE: 81 BPM | BODY MASS INDEX: 40.43 KG/M2

## 2022-05-31 DIAGNOSIS — R05.9 COUGH: ICD-10-CM

## 2022-05-31 DIAGNOSIS — J01.00 ACUTE NON-RECURRENT MAXILLARY SINUSITIS: Primary | ICD-10-CM

## 2022-05-31 DIAGNOSIS — G47.00 INSOMNIA, UNSPECIFIED TYPE: ICD-10-CM

## 2022-05-31 DIAGNOSIS — R06.83 SNORING: ICD-10-CM

## 2022-05-31 DIAGNOSIS — R06.2 WHEEZE: ICD-10-CM

## 2022-05-31 PROCEDURE — 99213 OFFICE O/P EST LOW 20 MIN: CPT | Performed by: NURSE PRACTITIONER

## 2022-05-31 RX ORDER — AMOXICILLIN AND CLAVULANATE POTASSIUM 875; 125 MG/1; MG/1
1 TABLET, FILM COATED ORAL EVERY 12 HOURS SCHEDULED
Qty: 20 TABLET | Refills: 0 | Status: SHIPPED | OUTPATIENT
Start: 2022-05-31 | End: 2022-12-29

## 2022-05-31 RX ORDER — ALBUTEROL SULFATE 90 UG/1
2 AEROSOL, METERED RESPIRATORY (INHALATION) EVERY 4 HOURS PRN
Qty: 18 G | Refills: 1 | Status: SHIPPED | OUTPATIENT
Start: 2022-05-31 | End: 2022-06-30

## 2022-05-31 NOTE — TELEPHONE ENCOUNTER
Caller: Fazal Tavarez    Relationship: Self    Best call back number: [phone]       What is the medical concern/diagnosis: PATIENT IS HAVING TROUBLE BREATHING IN HIS SLEEP, AND IS OFTEN WOKEN UP STRUGGLING TO CATCH HIS BREATH.     What specialty or service is being requested: SLEEP DOCTOR       Any additional details: PATIENT IS WANTING TO SEE A SLEEP DOCTOR ABOUT THE ISSUES HE IS HAVING AT NIGHT. PATIENT STATES HE IS HAVING TROUBLE BREATHING WHEN HE SLEEPS AND WOULD LIKE TO KNOW WHAT FRANKLIN GOLDSTEIN WOULD RECOMMEND.     PLEASE CALL AND ADVISE

## 2022-05-31 NOTE — TELEPHONE ENCOUNTER
Caller: Fazal Tavarez    Relationship: Self    Best call back number: [phone]       What medication are you requesting: SINUS MEDICATION, INHALER     What are your current symptoms: CONGESTION, SINUS DRAINAGE     How long have you been experiencing symptoms: ABOUT A WEEK     Have you had these symptoms before:    [x] Yes  [] No    Have you been treated for these symptoms before:   [x] Yes  [] No    If a prescription is needed, what is your preferred pharmacy and phone number:  [id number] [place], [place] - [address] AT [facility] - [phone] [facility] [phone] FX        Additional notes: PATIENT IS HAVING TROUBLE SLEEPING AT NIGHT DUE TO THESE ISSUES.     PATIENT STATES IT MAKES IT HARD TO BREATH, AND THE ISSUES OFTEN WAKE HIM UP IN HIS SLEEP.    PATIENT STATES HE STRUGGLES TO CATCH HIS BREATH IN HIS SLEEP.

## 2022-05-31 NOTE — PROGRESS NOTES
"Chief Complaint  Cough and Sinus Problem (congestion)    Subjective          Kwame presents to [facility]  As a 36 year old male c/o sinus pain and pressure, non productive cough and intermittent wheezing for the past couple of weeks.    Worse at night when he lays down.   States he had an albuterol inhaler in the past that worked well for his wheezing when he got sick.  He does not have a refill on that medication  He denies any fever, no acute sob, no abd pain, no N/V/D  No one else around him has been sick  He is not currently taking any otc medication    He is also c/o increased insomnia and restless sleeping.  States he does snore loudly at night.  He has woken up a few times gasping.  Has not had a recent sleep study.  States he has never used a cpap or been diagnosed with sleep apnes,  Family history present.    Declined Covid 19 testing    He has no other c/o today    The following portions of the patient's history were reviewed and updated as appropriate: allergies, current medications, past family history, past medical history, past social history, past surgical history, and problem list    Review of Systems   Constitutional: Negative for chills, fatigue and fever.   HENT: Positive for congestion, postnasal drip and sinus pressure. Negative for ear pain and sore throat.    Eyes: Negative for visual disturbance.   Respiratory: Positive for cough and wheezing. Negative for shortness of breath.    Cardiovascular: Negative for chest pain, palpitations and leg swelling.   Gastrointestinal: Negative for abdominal pain, diarrhea, nausea and vomiting.   Skin: Negative for rash.   Neurological: Negative for dizziness and light-headedness.        Objective   Vital Signs:   Vitals:    05/31/22 1537   BP: 141/64   Pulse: 81   Temp: 98.1 °F (36.7 °C)   SpO2: 96%   Weight: (!) 165 kg (363 lb)   Height: 188 cm (74.02\")        Class 3 Severe Obesity (BMI >=40). Obesity-related health " conditions include the following: hypertension, dyslipidemias and GERD. Obesity is unchanged. BMI is is above average; BMI management plan is completed. We discussed portion control and increasing exercise.       Physical Exam  Vitals reviewed.   Constitutional:       General: He is not in acute distress.  HENT:      Nose: Congestion present.      Right Turbinates: Swollen.      Left Turbinates: Swollen.      Right Sinus: Maxillary sinus tenderness present.      Left Sinus: Maxillary sinus tenderness present.      Mouth/Throat:      Mouth: Mucous membranes are moist.      Pharynx: Oropharynx is clear. No oropharyngeal exudate or posterior oropharyngeal erythema.   Eyes:      Conjunctiva/sclera: Conjunctivae normal.   Neck:      Thyroid: No thyromegaly.      Vascular: No carotid bruit.   Cardiovascular:      Rate and Rhythm: Normal rate and regular rhythm.      Heart sounds: Normal heart sounds.   Pulmonary:      Effort: Pulmonary effort is normal. No respiratory distress.      Breath sounds: Normal breath sounds. No stridor. No wheezing, rhonchi or rales.   Chest:      Chest wall: No tenderness.   Neurological:      Mental Status: He is alert.   Psychiatric:         Attention and Perception: Attention normal.         Mood and Affect: Mood normal.          Result Review :     The following data was reviewed by: MELODIE Joiner on 05/31/2022:  Declined covid testing         Assessment and Plan    Diagnoses and all orders for this visit:    1. Acute non-recurrent maxillary sinusitis (Primary)  -     amoxicillin-clavulanate (Augmentin) 875-125 MG per tablet; Take 1 tablet by mouth Every 12 (Twelve) Hours. One PO BID for infection with food  Dispense: 20 tablet; Refill: 0    Augmentin  was sent to the patient's pharmacy per patient request. Has taken this in the past for sinusitis and he reports that it works well  - Flonase nasal spray as needed  -Take all medications as prescribed and until completed.  -Monitor  for fever and take Tylenol as needed.  Drink plenty of fluids and get plenty of rest.  -Use cool-mist humidifier  -Seek immediate medical attention for fever unrelieved by Tylenol, chest pain, shortness of breath, sharp back pain, or any other worsening signs or symptoms.  -The patient verbalized understanding of all instructions given today.        2. Cough  -     amoxicillin-clavulanate (Augmentin) 875-125 MG per tablet; Take 1 tablet by mouth Every 12 (Twelve) Hours. One PO BID for infection with food  Dispense: 20 tablet; Refill: 0    3. Wheeze  -     albuterol sulfate  (90 Base) MCG/ACT inhaler; Inhale 2 puffs Every 4 (Four) Hours As Needed for Wheezing for up to 30 days.  Dispense: 18 g; Refill: 1  -     amoxicillin-clavulanate (Augmentin) 875-125 MG per tablet; Take 1 tablet by mouth Every 12 (Twelve) Hours. One PO BID for infection with food  Dispense: 20 tablet; Refill: 0    4. Snoring  -     Ambulatory Referral to Sleep Medicine    5. Insomnia, unspecified type  -     Ambulatory Referral to Sleep Medicine        Follow Up   Return if symptoms worsen or fail to improve.  Patient was given instructions and counseling regarding his condition or for health maintenance advice. Please see specific information pulled into the AVS if appropriate.

## 2022-07-13 ENCOUNTER — OFFICE VISIT (OUTPATIENT)
Dept: SLEEP MEDICINE | Facility: HOSPITAL | Age: 37
End: 2022-07-13

## 2022-07-13 VITALS
SYSTOLIC BLOOD PRESSURE: 143 MMHG | WEIGHT: 315 LBS | BODY MASS INDEX: 40.43 KG/M2 | DIASTOLIC BLOOD PRESSURE: 87 MMHG | HEART RATE: 80 BPM | HEIGHT: 74 IN | OXYGEN SATURATION: 96 %

## 2022-07-13 DIAGNOSIS — G47.8 NON-RESTORATIVE SLEEP: ICD-10-CM

## 2022-07-13 DIAGNOSIS — R06.83 SNORING: ICD-10-CM

## 2022-07-13 DIAGNOSIS — E66.01 MORBID OBESITY: ICD-10-CM

## 2022-07-13 DIAGNOSIS — G47.30 SLEEP APNEA, UNSPECIFIED TYPE: Primary | ICD-10-CM

## 2022-07-13 DIAGNOSIS — G47.10 HYPERSOMNIA: ICD-10-CM

## 2022-07-13 PROCEDURE — 99214 OFFICE O/P EST MOD 30 MIN: CPT | Performed by: FAMILY MEDICINE

## 2022-07-13 NOTE — PROGRESS NOTES
"[facility] New Patient/Consultation       Reason for Consultation: Snoring insomnia      Patient Care Team:  Kyleigh Andrade APRN as PCP - General (Family Medicine)  Parish Hernandez MD as Consulting Physician (Sleep Medicine)      History of present illness:  Thank you for asking me to see your patient.  The patient is a 36 y.o. male with hypertension GERD presents with concern for sleep disorder.  No history of prior sleep study or tonsillectomy.  Patient reports hypersomnia nonrestorative sleep snoring waking up gasping for breath and panicking concern for apneas.  Positive turning lights waking at night as well.  No family history of sleep apnea patient is aware.  Morbidly obese BMI 47.1.    Bedtime 9 PM to 10 PM sleep latency 1 hour wake time 4:30 AM weekdays 10 AM to 11 AM weekends sleep signs plus hours on the weekends 1 nap a day no rotating shifts.      Social History: Former no tobacco use 2 alcoholic drinks per day 10/week 2 caffeine beverages a day    Allergies:  Lisinopril    Family History: MARC no       Current Outpatient Medications:   •  amoxicillin-clavulanate (Augmentin) 875-125 MG per tablet, Take 1 tablet by mouth Every 12 (Twelve) Hours. One PO BID for infection with food, Disp: 20 tablet, Rfl: 0  •  cetirizine (zyrTEC) 10 MG tablet, Take 10 mg by mouth Daily., Disp: , Rfl:   •  losartan (COZAAR) 50 MG tablet, Take 1 tablet by mouth Daily., Disp: 90 tablet, Rfl: 1  •  omeprazole (priLOSEC) 20 MG capsule, Take 1 capsule by mouth Daily., Disp: 90 capsule, Rfl: 1    Vital Signs:    Vitals:    07/13/22 1500   BP: 143/87   Pulse: 80   SpO2: 96%   Weight: (!) 167 kg (367 lb 3.2 oz)   Height: 188 cm (74\")      Body mass index is 47.15 kg/m².  Neck Circumference: 18.25 inches      REVIEW OF SYSTEMS.  Full review of systems available on the intake form which is scanned in the media tab.  The relevant positive are noted below  1. Daytime excessive sleepiness with [place] Sleepiness " "Scale :Total score: 21   2. Snoring  3. All negative      Physical exam:  Vitals:    07/13/22 1500   BP: 143/87   Pulse: 80   SpO2: 96%   Weight: (!) 167 kg (367 lb 3.2 oz)   Height: 188 cm (74\")    Body mass index is 47.15 kg/m². Neck Circumference: 18.25 inches  HEENT: Head is atraumatic, normocephalic  Eyes: pupils are round equal and reacting to light and accommodation, conjunctiva normal  RESPIRATORY SYSTEM: Regular respirations  CARDIOVASULAR SYSTEM: Regular rate  EXTREMITES: No cyanosis, clubbing  NEUROLOGICAL SYSTEM: Oriented x 3, no gross motor defects, gait normal      Impression:  1. Sleep apnea, unspecified type    2. Hypersomnia    3. Non-restorative sleep    4. Snoring    5. Morbid obesity (HCC)        Plan:    Good sleep hygiene measures should be maintained.  Weight loss would be beneficial in this patient who is morbidly obese BMI 47.1.    I discussed the pathophysiology of obstructive sleep apnea with the patient.  We discussed the adverse outcomes associated with untreated sleep-disordered breathing.  We discussed treatment modalities of obstructive sleep apnea including CPAP device as well as oral mandibular advancement device. Sleep study will be scheduled to establish definitive diagnosis of sleep disorder breathing.  Weight loss will be strongly beneficial in order to reduce the severity of sleep-disordered breathing.  Patient has narrow oropharyngeal structure.  Caution during activities that require prolonged concentration is strongly advised.  Patient will be notified of sleep study results after sleep study is completed.  If sleep apnea is only mild,  oral mandibular advancement device may be one of the treatment options.  However if sleep apnea is moderately severe, CPAP treatment will be strongly encouraged.  The patient is not opposed to treatment with CPAP device if we confirm significant obstructive sleep apnea on polysomnography.    Thank you for allowing me to participate in your " patient's care.    Parish Hernandez MD  Sleep Medicine  07/13/22  16:21 EDT

## 2022-07-15 ENCOUNTER — TELEPHONE (OUTPATIENT)
Dept: FAMILY MEDICINE CLINIC | Facility: CLINIC | Age: 37
End: 2022-07-15

## 2022-07-15 NOTE — TELEPHONE ENCOUNTER
Caller: Fazal Tavarez    Relationship: Self    Best call back number: [phone]       What specialty or service is being requested: ALLERGIST     Any additional details: PATIENT WAS TOLD BY HIS SLEEP DR THAT HE COULD BENEFIT FROM SEEING AN ALLERGIST.     PATIENT IS WANTING TO KNOW IF THERE IS ANYONE FRANKLIN GOLDSTEIN WOULD RECOMMEND.     PLEASE ADVISE

## 2022-08-19 ENCOUNTER — HOSPITAL ENCOUNTER (OUTPATIENT)
Dept: SLEEP MEDICINE | Facility: HOSPITAL | Age: 37
Discharge: HOME | End: 2022-08-19
Admitting: FAMILY MEDICINE

## 2022-08-19 DIAGNOSIS — G47.30 SLEEP APNEA, UNSPECIFIED TYPE: ICD-10-CM

## 2022-08-19 DIAGNOSIS — R06.83 SNORING: ICD-10-CM

## 2022-08-19 DIAGNOSIS — E66.01 MORBID OBESITY: ICD-10-CM

## 2022-08-19 DIAGNOSIS — G47.8 NON-RESTORATIVE SLEEP: ICD-10-CM

## 2022-08-19 DIAGNOSIS — G47.10 HYPERSOMNIA: ICD-10-CM

## 2022-08-19 PROCEDURE — 95806 SLEEP STUDY UNATT&RESP EFFT: CPT | Performed by: FAMILY MEDICINE

## 2022-08-19 PROCEDURE — 95806 SLEEP STUDY UNATT&RESP EFFT: CPT

## 2022-09-15 DIAGNOSIS — R06.83 SNORING: ICD-10-CM

## 2022-09-15 DIAGNOSIS — G47.34 SLEEP RELATED HYPOXIA: ICD-10-CM

## 2022-09-15 DIAGNOSIS — E66.01 MORBID OBESITY: ICD-10-CM

## 2022-09-15 DIAGNOSIS — G47.33 SEVERE OBSTRUCTIVE SLEEP APNEA: Primary | ICD-10-CM

## 2022-09-15 DIAGNOSIS — G47.10 HYPERSOMNIA: ICD-10-CM

## 2022-09-15 DIAGNOSIS — G47.8 NON-RESTORATIVE SLEEP: ICD-10-CM

## 2022-09-21 ENCOUNTER — TELEPHONE (OUTPATIENT)
Dept: SLEEP MEDICINE | Facility: HOSPITAL | Age: 37
End: 2022-09-21

## 2022-09-21 NOTE — TELEPHONE ENCOUNTER
Spoke with pt about results and faxed order to [facility]. Pt will cb and schedule a f/u appt once machine is received.

## 2022-10-02 DIAGNOSIS — I10 ESSENTIAL HYPERTENSION: ICD-10-CM

## 2022-10-03 DIAGNOSIS — K21.9 GASTROESOPHAGEAL REFLUX DISEASE WITHOUT ESOPHAGITIS: ICD-10-CM

## 2022-10-03 DIAGNOSIS — I10 ESSENTIAL HYPERTENSION: ICD-10-CM

## 2022-10-03 RX ORDER — LOSARTAN POTASSIUM 50 MG/1
50 TABLET ORAL DAILY
Qty: 30 TABLET | Refills: 0 | Status: SHIPPED | OUTPATIENT
Start: 2022-10-03 | End: 2022-11-11

## 2022-10-03 RX ORDER — LOSARTAN POTASSIUM 50 MG/1
50 TABLET ORAL DAILY
Qty: 90 TABLET | OUTPATIENT
Start: 2022-10-03

## 2022-10-03 RX ORDER — OMEPRAZOLE 20 MG/1
20 CAPSULE, DELAYED RELEASE ORAL DAILY
Qty: 30 CAPSULE | Refills: 0 | Status: SHIPPED | OUTPATIENT
Start: 2022-10-03 | End: 2022-11-11

## 2022-10-03 RX ORDER — OMEPRAZOLE 20 MG/1
20 CAPSULE, DELAYED RELEASE ORAL DAILY
Qty: 90 CAPSULE | OUTPATIENT
Start: 2022-10-03

## 2022-11-01 DIAGNOSIS — I10 ESSENTIAL HYPERTENSION: ICD-10-CM

## 2022-11-01 RX ORDER — LOSARTAN POTASSIUM 50 MG/1
50 TABLET ORAL DAILY
Qty: 90 TABLET | OUTPATIENT
Start: 2022-11-01

## 2022-11-11 DIAGNOSIS — K21.9 GASTROESOPHAGEAL REFLUX DISEASE WITHOUT ESOPHAGITIS: ICD-10-CM

## 2022-11-11 DIAGNOSIS — I10 ESSENTIAL HYPERTENSION: ICD-10-CM

## 2022-11-11 RX ORDER — OMEPRAZOLE 20 MG/1
20 CAPSULE, DELAYED RELEASE ORAL DAILY
Qty: 30 CAPSULE | Refills: 0 | Status: SHIPPED | OUTPATIENT
Start: 2022-11-11 | End: 2022-11-11

## 2022-11-11 RX ORDER — LOSARTAN POTASSIUM 50 MG/1
50 TABLET ORAL DAILY
Qty: 90 TABLET | Refills: 0 | Status: SHIPPED | OUTPATIENT
Start: 2022-11-11 | End: 2023-02-06

## 2022-11-11 RX ORDER — OMEPRAZOLE 20 MG/1
20 CAPSULE, DELAYED RELEASE ORAL DAILY
Qty: 90 CAPSULE | Refills: 0 | Status: SHIPPED | OUTPATIENT
Start: 2022-11-11

## 2022-11-11 NOTE — TELEPHONE ENCOUNTER
Rx Refill Note  Requested Prescriptions     Pending Prescriptions Disp Refills   • omeprazole (priLOSEC) 20 MG capsule [Pharmacy Med Name: OMEPRAZOLE 20MG CAPSULES] 90 capsule      Sig: TAKE 1 CAPSULE BY MOUTH DAILY      Last office visit with prescribing clinician: 12/15/2021      Next office visit with prescribing clinician: Visit date not found

## 2022-11-11 NOTE — TELEPHONE ENCOUNTER
Rx Refill Note  Requested Prescriptions     Pending Prescriptions Disp Refills   • omeprazole (priLOSEC) 20 MG capsule [Pharmacy Med Name: OMEPRAZOLE 20MG CAPSULES] 30 capsule 0     Sig: TAKE 1 CAPSULE BY MOUTH DAILY   • losartan (COZAAR) 50 MG tablet [Pharmacy Med Name: LOSARTAN 50MG TABLETS] 90 tablet      Sig: TAKE 1 TABLET BY MOUTH DAILY      Last office visit with prescribing clinician: Visit date not found      Next office visit with prescribing clinician: Visit date not found

## 2022-12-13 DIAGNOSIS — K21.9 GASTROESOPHAGEAL REFLUX DISEASE WITHOUT ESOPHAGITIS: ICD-10-CM

## 2022-12-13 RX ORDER — OMEPRAZOLE 20 MG/1
20 CAPSULE, DELAYED RELEASE ORAL DAILY
Qty: 90 CAPSULE | Refills: 0 | OUTPATIENT
Start: 2022-12-13

## 2022-12-16 NOTE — TELEPHONE ENCOUNTER
PATIENT IS CALLING TO CHECK THE STATUS OF HIS REQUEST FOR A REFILL FOR THE FOLLOWING MEDICATION.   omeprazole (priLOSEC) 20 MG capsule         [id number] [place], [place] - [address] AT [facility] - [phone] [facility] [phone]   [phone]    PATIENT CALL BACK #   [phone]

## 2022-12-29 ENCOUNTER — OFFICE VISIT (OUTPATIENT)
Dept: FAMILY MEDICINE CLINIC | Facility: CLINIC | Age: 37
End: 2022-12-29

## 2022-12-29 VITALS
OXYGEN SATURATION: 96 % | TEMPERATURE: 98.2 F | SYSTOLIC BLOOD PRESSURE: 138 MMHG | HEART RATE: 80 BPM | BODY MASS INDEX: 40.43 KG/M2 | WEIGHT: 315 LBS | RESPIRATION RATE: 16 BRPM | DIASTOLIC BLOOD PRESSURE: 80 MMHG | HEIGHT: 74 IN

## 2022-12-29 DIAGNOSIS — R05.8 PRODUCTIVE COUGH: ICD-10-CM

## 2022-12-29 DIAGNOSIS — J01.10 ACUTE FRONTAL SINUSITIS, RECURRENCE NOT SPECIFIED: Primary | ICD-10-CM

## 2022-12-29 PROCEDURE — 99213 OFFICE O/P EST LOW 20 MIN: CPT

## 2022-12-29 RX ORDER — AZELASTINE 1 MG/ML
SPRAY, METERED NASAL
COMMUNITY
Start: 2022-10-03

## 2022-12-29 RX ORDER — KETOTIFEN FUMARATE 0.35 MG/ML
1 SOLUTION/ DROPS OPHTHALMIC 2 TIMES DAILY
COMMUNITY
Start: 2022-10-03

## 2022-12-29 RX ORDER — BENZONATATE 100 MG/1
100 CAPSULE ORAL 3 TIMES DAILY PRN
Qty: 30 CAPSULE | Refills: 1 | Status: SHIPPED | OUTPATIENT
Start: 2022-12-29

## 2022-12-29 RX ORDER — AMOXICILLIN AND CLAVULANATE POTASSIUM 875; 125 MG/1; MG/1
1 TABLET, FILM COATED ORAL EVERY 12 HOURS SCHEDULED
Qty: 14 TABLET | Refills: 0 | Status: SHIPPED | OUTPATIENT
Start: 2022-12-29 | End: 2023-01-05

## 2022-12-29 RX ORDER — DILTIAZEM HYDROCHLORIDE 60 MG/1
TABLET, FILM COATED ORAL
COMMUNITY
Start: 2022-12-06

## 2022-12-29 RX ORDER — FLUTICASONE FUROATE 27.5 UG/1
1 SPRAY, METERED NASAL 2 TIMES DAILY
COMMUNITY
Start: 2022-12-17

## 2022-12-29 NOTE — PROGRESS NOTES
"Chief Complaint  Sinus Pressure and Headache    Subjective          History of Present Illness    Fazal Tavarez 37 y.o. male presents for evaluation of sinus pressure, and congestion. Symptoms include congestion, cough, sneezing, headache and sinus pain.  Onset of symptoms was 3 days ago, gradually worsening since that time. Patient denies shortness of breath, wheezing, upper respiratory congestion, hemoptysis, pleuritic chest pain, fever, dyspnea on exertion, ear drainage, eye discharge, diarrhea, vomiting, vertigo, chills, epistaxis, high fever, joint pain.   Evaluation to date: none Treatment to date:  OTC cough suppressant.     Review of Systems   Constitutional: Negative for appetite change, chills, fatigue and fever.   HENT: Positive for sinus pressure and sneezing. Negative for congestion, ear pain, sore throat and trouble swallowing.    Eyes: Negative for visual disturbance.   Respiratory: Positive for cough (productive). Negative for chest tightness, shortness of breath and wheezing.    Cardiovascular: Negative for chest pain, palpitations and leg swelling.   Gastrointestinal: Negative for abdominal pain, constipation, diarrhea, nausea and vomiting.   Genitourinary: Negative for dysuria, frequency and urgency.   Musculoskeletal: Negative for gait problem, myalgias and neck pain.   Skin: Negative for rash.   Neurological: Negative for dizziness, syncope, weakness and light-headedness.   Psychiatric/Behavioral: Negative for dysphoric mood. The patient is not nervous/anxious.         Objective   Vital Signs:   /80   Pulse 80   Temp 98.2 °F (36.8 °C) (Oral)   Resp 16   Ht 188 cm (74\")   Wt (!) 163 kg (360 lb)   SpO2 96%   BMI 46.22 kg/m²      Physical Exam  Vitals and nursing note reviewed.   Constitutional:       General: He is not in acute distress.     Appearance: Normal appearance. He is well-developed. He is not toxic-appearing or diaphoretic.   HENT:      Head: Normocephalic and " atraumatic. Hair is normal.      Right Ear: External ear normal. No drainage, swelling or tenderness. Tympanic membrane is not erythematous or retracted.      Left Ear: External ear normal. No drainage, swelling or tenderness. Tympanic membrane is not erythematous or retracted.      Nose: Congestion present. No mucosal edema or rhinorrhea.      Right Sinus: Frontal sinus tenderness present. No maxillary sinus tenderness.      Left Sinus: Frontal sinus tenderness present. No maxillary sinus tenderness.      Mouth/Throat:      Mouth: Mucous membranes are moist. No oral lesions.      Pharynx: Uvula midline. Posterior oropharyngeal erythema present. No pharyngeal swelling, oropharyngeal exudate or uvula swelling.      Tonsils: No tonsillar exudate or tonsillar abscesses.   Eyes:      General: No scleral icterus.        Right eye: No discharge.         Left eye: No discharge.      Conjunctiva/sclera: Conjunctivae normal.      Pupils: Pupils are equal, round, and reactive to light.   Cardiovascular:      Rate and Rhythm: Normal rate and regular rhythm.      Heart sounds: Normal heart sounds. No murmur heard.    No gallop.   Pulmonary:      Effort: No respiratory distress.      Breath sounds: Normal breath sounds. No stridor or decreased air movement. No decreased breath sounds, wheezing, rhonchi or rales.   Chest:      Chest wall: No tenderness.   Abdominal:      Palpations: Abdomen is soft.      Tenderness: There is no abdominal tenderness.   Musculoskeletal:      Cervical back: Normal range of motion and neck supple.   Lymphadenopathy:      Cervical: No cervical adenopathy.   Skin:     General: Skin is warm and dry.      Findings: No rash.   Neurological:      Mental Status: He is alert and oriented to person, place, and time.      Motor: No abnormal muscle tone.   Psychiatric:         Behavior: Behavior normal.         Thought Content: Thought content normal.         Judgment: Judgment normal.                          Assessment and Plan      Diagnoses and all orders for this visit:    1. Acute frontal sinusitis, recurrence not specified (Primary)  -     amoxicillin-clavulanate (Augmentin) 875-125 MG per tablet; Take 1 tablet by mouth Every 12 (Twelve) Hours for 7 days.  Dispense: 14 tablet; Refill: 0  -     benzonatate (Tessalon Perles) 100 MG capsule; Take 1 capsule by mouth 3 (Three) Times a Day As Needed for Cough.  Dispense: 30 capsule; Refill: 1    2. Productive cough  -     amoxicillin-clavulanate (Augmentin) 875-125 MG per tablet; Take 1 tablet by mouth Every 12 (Twelve) Hours for 7 days.  Dispense: 14 tablet; Refill: 0  -     benzonatate (Tessalon Perles) 100 MG capsule; Take 1 capsule by mouth 3 (Three) Times a Day As Needed for Cough.  Dispense: 30 capsule; Refill: 1            Follow Up     Return if symptoms worsen or fail to improve.    Patient was given instructions and counseling regarding his condition or for health maintenance advice. Please see specific information pulled into the AVS if appropriate.     -Take medication as prescribed.  -Monitor for fever and take Tylenol as needed.  Drink plenty of fluids and get plenty of rest.  -Use cool-mist humidifier as needed.  -Seek immediate medical attention for fever unrelieved by Tylenol, chest pain, shortness of breath, decreased oxygen saturations, sharp pain with breathing, or any other worsening signs or symptoms.  -Return to the office is symptoms worsen or do not improve.

## 2023-02-04 DIAGNOSIS — I10 ESSENTIAL HYPERTENSION: ICD-10-CM

## 2023-02-06 RX ORDER — LOSARTAN POTASSIUM 50 MG/1
50 TABLET ORAL DAILY
Qty: 30 TABLET | Refills: 0 | Status: SHIPPED | OUTPATIENT
Start: 2023-02-06 | End: 2023-03-07

## 2023-02-06 NOTE — TELEPHONE ENCOUNTER
Requested Prescriptions     Pending Prescriptions Disp Refills   • losartan (COZAAR) 50 MG tablet [Pharmacy Med Name: LOSARTAN 50MG TABLETS] 30 tablet 0     Sig: TAKE 1 TABLET BY MOUTH DAILY   Sent a 30 day RX. Pt is needing an appointment.     Okay for the HUB to relay message

## 2023-03-06 DIAGNOSIS — I10 ESSENTIAL HYPERTENSION: ICD-10-CM

## 2023-03-07 DIAGNOSIS — I10 ESSENTIAL HYPERTENSION: ICD-10-CM

## 2023-03-07 RX ORDER — LOSARTAN POTASSIUM 50 MG/1
50 TABLET ORAL DAILY
Qty: 30 TABLET | Refills: 0 | Status: SHIPPED | OUTPATIENT
Start: 2023-03-07 | End: 2023-04-06

## 2023-03-08 RX ORDER — LOSARTAN POTASSIUM 50 MG/1
50 TABLET ORAL DAILY
Qty: 90 TABLET | OUTPATIENT
Start: 2023-03-08

## 2023-03-08 NOTE — TELEPHONE ENCOUNTER
Rx Refill Note  Requested Prescriptions     Pending Prescriptions Disp Refills   • losartan (COZAAR) 50 MG tablet [Pharmacy Med Name: LOSARTAN 50MG TABLETS] 90 tablet      Sig: TAKE 1 TABLET BY MOUTH DAILY      Last office visit with prescribing clinician: 12/15/2021   Last telemedicine visit with prescribing clinician: Visit date not found   Next office visit with prescribing clinician: Visit date not found                         Would you like a call back once the refill request has been completed: [] Yes [] No    If the office needs to give you a call back, can they leave a voicemail: [] Yes [] No    Radha Hayward MA  03/08/23, 09:38 EST

## 2023-04-05 DIAGNOSIS — I10 ESSENTIAL HYPERTENSION: ICD-10-CM

## 2023-04-06 RX ORDER — LOSARTAN POTASSIUM 50 MG/1
50 TABLET ORAL DAILY
Qty: 15 TABLET | Refills: 0 | Status: SHIPPED | OUTPATIENT
Start: 2023-04-06

## 2023-05-09 DIAGNOSIS — K21.9 GASTROESOPHAGEAL REFLUX DISEASE WITHOUT ESOPHAGITIS: ICD-10-CM

## 2023-05-09 DIAGNOSIS — I10 ESSENTIAL HYPERTENSION: ICD-10-CM

## 2023-05-09 RX ORDER — OMEPRAZOLE 20 MG/1
20 CAPSULE, DELAYED RELEASE ORAL DAILY
Qty: 90 CAPSULE | Refills: 0 | Status: SHIPPED | OUTPATIENT
Start: 2023-05-09

## 2023-05-09 RX ORDER — LOSARTAN POTASSIUM 50 MG/1
50 TABLET ORAL DAILY
Qty: 15 TABLET | Refills: 0 | Status: SHIPPED | OUTPATIENT
Start: 2023-05-09

## 2023-05-09 NOTE — TELEPHONE ENCOUNTER
Rx Refill Note  Requested Prescriptions     Pending Prescriptions Disp Refills   • omeprazole (priLOSEC) 20 MG capsule [Pharmacy Med Name: OMEPRAZOLE 20MG CAPSULES] 90 capsule 0     Sig: TAKE 1 CAPSULE BY MOUTH DAILY   • losartan (COZAAR) 50 MG tablet [Pharmacy Med Name: LOSARTAN 50MG TABLETS] 15 tablet 0     Sig: TAKE 1 TABLET BY MOUTH DAILY      Last office visit with prescribing clinician: 12/29/2022  Last telemedicine visit with prescribing clinician: 4/5/2023   Next office visit with prescribing clinician: Visit date not found                         Would you like a call back once the refill request has been completed: [] Yes [] No    If the office needs to give you a call back, can they leave a voicemail: [] Yes [] No    Missael Amaral MA  05/09/23, 13:14 EDT

## 2023-05-31 DIAGNOSIS — I10 ESSENTIAL HYPERTENSION: ICD-10-CM

## 2023-05-31 RX ORDER — LOSARTAN POTASSIUM 50 MG/1
50 TABLET ORAL DAILY
Qty: 15 TABLET | Refills: 0 | OUTPATIENT
Start: 2023-05-31

## 2023-05-31 NOTE — TELEPHONE ENCOUNTER
"  Caller: Fazal Tavarez \"Kwame\"    Relationship: Self    Best call back number: [phone]     Requested Prescriptions:   Requested Prescriptions     Pending Prescriptions Disp Refills   • losartan (COZAAR) 50 MG tablet 15 tablet 0     Sig: Take 1 tablet by mouth Daily.        Pharmacy where request should be sent: [facility] #[id number] [facility] [address] AT [facility] & [facility] [phone] [facility] [phone]      Last office visit with prescribing clinician: 12/15/2021   Last telemedicine visit with prescribing clinician: Visit date not found   Next office visit with prescribing clinician: Visit date not found     Additional details provided by patient: COMPLETELY OUT      Does the patient have less than a 3 day supply:  [x] Yes  [] No    Would you like a call back once the refill request has been completed: [x] Yes [] No    If the office needs to give you a call back, can they leave a voicemail: [x] Yes [] No    Phong Eduardo   05/31/23 12:05 EDT           "

## 2023-06-01 NOTE — TELEPHONE ENCOUNTER
"  Caller: Fazal Tavarez \"Kwame\"    Relationship: Self    Best call back number: [phone]*    What is the best time to reach you: ANYTIME    Who are you requesting to speak with (clinical staff, provider,  specific staff member): CLINICAL    What was the call regarding: PATIENT REQUESTING CALL BACK WITH STATUS OF MEDICATION REFILL FOR LOSARTAN 50 MG.    Is it okay if the provider responds through [employer]: NO. CONTACT VIA TELEPHONE PLEASE.          "

## 2023-06-02 DIAGNOSIS — I10 ESSENTIAL HYPERTENSION: ICD-10-CM

## 2023-06-02 NOTE — TELEPHONE ENCOUNTER
"    Caller: Fazal Tavarez \"Kwame\"    Relationship: Self    Best call back number: [phone] (Mobile)    Requested Prescriptions:   Requested Prescriptions     Refused Prescriptions Disp Refills   • losartan (COZAAR) 50 MG tablet 15 tablet 0     Sig: Take 1 tablet by mouth Daily.     Refused By: PACO TATUM     Reason for Refusal: Patient needs an appointment        Pharmacy where request should be sent: [facility] #[id number] [facility] [address] AT [facility] & [facility] [phone] [facility] [phone]      Last office visit with prescribing clinician: 12/15/2021   Last telemedicine visit with prescribing clinician: Visit date not found   Next office visit with prescribing clinician: 6/13/2023     Additional details provided by patient: PATIENT HAS BEEN WITHOUT MEDS FOR SEVERAL DAYS. PLEASE ADVISE.     Does the patient have less than a 3 day supply:  [x] Yes  [] No    Would you like a call back once the refill request has been completed: [] Yes [x] No    If the office needs to give you a call back, can they leave a voicemail: [] Yes [x] No    Phong Jaimes Rep   06/02/23 13:51 EDT     "

## 2023-06-05 ENCOUNTER — TELEPHONE (OUTPATIENT)
Dept: FAMILY MEDICINE CLINIC | Facility: CLINIC | Age: 38
End: 2023-06-05
Payer: COMMERCIAL

## 2023-06-05 DIAGNOSIS — I10 ESSENTIAL HYPERTENSION: ICD-10-CM

## 2023-06-05 RX ORDER — LOSARTAN POTASSIUM 50 MG/1
50 TABLET ORAL DAILY
Qty: 15 TABLET | Refills: 0 | Status: SHIPPED | OUTPATIENT
Start: 2023-06-05

## 2023-06-06 RX ORDER — LOSARTAN POTASSIUM 50 MG/1
50 TABLET ORAL DAILY
Qty: 15 TABLET | Refills: 0 | OUTPATIENT
Start: 2023-06-06

## 2023-06-13 ENCOUNTER — OFFICE VISIT (OUTPATIENT)
Dept: FAMILY MEDICINE CLINIC | Facility: CLINIC | Age: 38
End: 2023-06-13
Payer: COMMERCIAL

## 2023-06-13 VITALS
TEMPERATURE: 98.1 F | SYSTOLIC BLOOD PRESSURE: 101 MMHG | HEIGHT: 74 IN | HEART RATE: 60 BPM | RESPIRATION RATE: 16 BRPM | WEIGHT: 315 LBS | BODY MASS INDEX: 40.43 KG/M2 | OXYGEN SATURATION: 95 % | DIASTOLIC BLOOD PRESSURE: 56 MMHG

## 2023-06-13 DIAGNOSIS — I10 ESSENTIAL HYPERTENSION: Primary | ICD-10-CM

## 2023-06-13 DIAGNOSIS — K21.9 GASTROESOPHAGEAL REFLUX DISEASE WITHOUT ESOPHAGITIS: ICD-10-CM

## 2023-06-13 DIAGNOSIS — Z12.5 SCREENING FOR PROSTATE CANCER: ICD-10-CM

## 2023-06-13 PROCEDURE — 99214 OFFICE O/P EST MOD 30 MIN: CPT | Performed by: NURSE PRACTITIONER

## 2023-06-13 RX ORDER — OMEPRAZOLE 20 MG/1
20 CAPSULE, DELAYED RELEASE ORAL DAILY
Qty: 90 CAPSULE | Refills: 3 | Status: SHIPPED | OUTPATIENT
Start: 2023-06-13

## 2023-06-13 RX ORDER — LOSARTAN POTASSIUM 50 MG/1
50 TABLET ORAL DAILY
Qty: 90 TABLET | Refills: 3 | Status: SHIPPED | OUTPATIENT
Start: 2023-06-13

## 2023-06-13 RX ORDER — NADOLOL 20 MG/1
20 TABLET ORAL
COMMUNITY
Start: 2023-04-07

## 2023-06-13 NOTE — PROGRESS NOTES
"Subjective   Fazal Tavarez is a 37 y.o. male.     History of Present Illness    Since the last visit, he has overall felt well.  He has Primary Hypertension and well controlled on current medication and GERD controlled on PPI Rx.  he has been compliant with current medications have reviewed them.  The patient denies medication side effects.  Will refill medications. /56 (BP Location: Left arm, Patient Position: Sitting)   Pulse 60   Temp 98.1 °F (36.7 °C) (Oral)   Resp 16   Ht 188 cm (74\")   Wt (!) 161 kg (355 lb 3.2 oz)   SpO2 95%   BMI 45.60 kg/m²     Results for orders placed or performed in visit on 09/13/21   Comprehensive metabolic panel    Specimen: Blood   Result Value Ref Range    Glucose 106 (H) 65 - 99 mg/dL    BUN 13 6 - 20 mg/dL    Creatinine 0.81 0.76 - 1.27 mg/dL    eGFR Non African Am 108 >60 mL/min/1.73    eGFR African Am 131 >60 mL/min/1.73    BUN/Creatinine Ratio 16.0 7.0 - 25.0    Sodium 140 136 - 145 mmol/L    Potassium 4.7 3.5 - 5.2 mmol/L    Chloride 102 98 - 107 mmol/L    Total CO2 26.0 22.0 - 29.0 mmol/L    Calcium 10.0 8.6 - 10.5 mg/dL    Total Protein 7.1 6.0 - 8.5 g/dL    Albumin 4.90 3.50 - 5.20 g/dL    Globulin 2.2 gm/dL    A/G Ratio 2.2 g/dL    Total Bilirubin 0.2 0.0 - 1.2 mg/dL    Alkaline Phosphatase 88 39 - 117 U/L    AST (SGOT) 69 (H) 1 - 40 U/L    ALT (SGPT) 83 (H) 1 - 41 U/L     He is UTD with his allergist and was started on nadolol in the evening for his headaches which has helped.     The following portions of the patient's history were reviewed and updated as appropriate: allergies, current medications, past family history, past medical history, past social history, past surgical history, and problem list.    Review of Systems   Constitutional:  Negative for fatigue.   Respiratory:  Negative for cough and shortness of breath.    Cardiovascular:  Negative for chest pain and palpitations.   Skin:  Negative for rash.   Psychiatric/Behavioral:  Negative for " dysphoric mood and sleep disturbance. The patient is not nervous/anxious.      Objective   Physical Exam  Vitals and nursing note reviewed.   Constitutional:       Appearance: He is well-developed.   Neck:      Vascular: No carotid bruit.   Cardiovascular:      Rate and Rhythm: Normal rate and regular rhythm.   Pulmonary:      Effort: Pulmonary effort is normal.      Breath sounds: Normal breath sounds.   Neurological:      Mental Status: He is alert and oriented to person, place, and time.   Psychiatric:         Mood and Affect: Mood normal.         Behavior: Behavior normal.         Thought Content: Thought content normal.         Judgment: Judgment normal.       Assessment & Plan   Diagnoses and all orders for this visit:    1. Essential hypertension (Primary)  -     losartan (COZAAR) 50 MG tablet; Take 1 tablet by mouth Daily.  Dispense: 90 tablet; Refill: 3  -     Comprehensive metabolic panel  -     Lipid panel  -     CBC and Differential  -     TSH  -     PSA Screen    2. Gastroesophageal reflux disease without esophagitis  -     omeprazole (priLOSEC) 20 MG capsule; Take 1 capsule by mouth Daily.  Dispense: 90 capsule; Refill: 3  -     Comprehensive metabolic panel  -     Lipid panel  -     CBC and Differential  -     TSH  -     PSA Screen    3. Screening for prostate cancer  -     PSA Screen

## 2023-06-21 LAB
ALBUMIN SERPL-MCNC: 4.5 G/DL (ref 3.5–5.2)
ALBUMIN/GLOB SERPL: 2 G/DL
ALP SERPL-CCNC: 82 U/L (ref 39–117)
ALT SERPL-CCNC: 39 U/L (ref 1–41)
AST SERPL-CCNC: 33 U/L (ref 1–40)
BASOPHILS # BLD AUTO: 0.06 10*3/MM3 (ref 0–0.2)
BASOPHILS NFR BLD AUTO: 0.7 % (ref 0–1.5)
BILIRUB SERPL-MCNC: 0.3 MG/DL (ref 0–1.2)
BUN SERPL-MCNC: 11 MG/DL (ref 6–20)
BUN/CREAT SERPL: 12.1 (ref 7–25)
CALCIUM SERPL-MCNC: 9.9 MG/DL (ref 8.6–10.5)
CHLORIDE SERPL-SCNC: 104 MMOL/L (ref 98–107)
CHOLEST SERPL-MCNC: 215 MG/DL (ref 0–200)
CO2 SERPL-SCNC: 29.1 MMOL/L (ref 22–29)
CREAT SERPL-MCNC: 0.91 MG/DL (ref 0.76–1.27)
EGFRCR SERPLBLD CKD-EPI 2021: 110.6 ML/MIN/1.73
EOSINOPHIL # BLD AUTO: 0.31 10*3/MM3 (ref 0–0.4)
EOSINOPHIL NFR BLD AUTO: 3.5 % (ref 0.3–6.2)
ERYTHROCYTE [DISTWIDTH] IN BLOOD BY AUTOMATED COUNT: 14.3 % (ref 12.3–15.4)
GLOBULIN SER CALC-MCNC: 2.2 GM/DL
GLUCOSE SERPL-MCNC: 88 MG/DL (ref 65–99)
HCT VFR BLD AUTO: 40.5 % (ref 37.5–51)
HDLC SERPL-MCNC: 40 MG/DL (ref 40–60)
HGB BLD-MCNC: 13.8 G/DL (ref 13–17.7)
IMM GRANULOCYTES # BLD AUTO: 0.03 10*3/MM3 (ref 0–0.05)
IMM GRANULOCYTES NFR BLD AUTO: 0.3 % (ref 0–0.5)
LDLC SERPL CALC-MCNC: 129 MG/DL (ref 0–100)
LYMPHOCYTES # BLD AUTO: 3.44 10*3/MM3 (ref 0.7–3.1)
LYMPHOCYTES NFR BLD AUTO: 38.8 % (ref 19.6–45.3)
MCH RBC QN AUTO: 29.4 PG (ref 26.6–33)
MCHC RBC AUTO-ENTMCNC: 34.1 G/DL (ref 31.5–35.7)
MCV RBC AUTO: 86.4 FL (ref 79–97)
MONOCYTES # BLD AUTO: 0.53 10*3/MM3 (ref 0.1–0.9)
MONOCYTES NFR BLD AUTO: 6 % (ref 5–12)
NEUTROPHILS # BLD AUTO: 4.5 10*3/MM3 (ref 1.7–7)
NEUTROPHILS NFR BLD AUTO: 50.7 % (ref 42.7–76)
NRBC BLD AUTO-RTO: 0.1 /100 WBC (ref 0–0.2)
PLATELET # BLD AUTO: 198 10*3/MM3 (ref 140–450)
POTASSIUM SERPL-SCNC: 4.9 MMOL/L (ref 3.5–5.2)
PROT SERPL-MCNC: 6.7 G/DL (ref 6–8.5)
PSA SERPL-MCNC: 0.32 NG/ML (ref 0–4)
RBC # BLD AUTO: 4.69 10*6/MM3 (ref 4.14–5.8)
SODIUM SERPL-SCNC: 141 MMOL/L (ref 136–145)
TRIGL SERPL-MCNC: 260 MG/DL (ref 0–150)
TSH SERPL DL<=0.005 MIU/L-ACNC: 2.29 UIU/ML (ref 0.27–4.2)
VLDLC SERPL CALC-MCNC: 46 MG/DL (ref 5–40)
WBC # BLD AUTO: 8.87 10*3/MM3 (ref 3.4–10.8)

## 2023-08-03 DIAGNOSIS — K21.9 GASTROESOPHAGEAL REFLUX DISEASE WITHOUT ESOPHAGITIS: ICD-10-CM

## 2023-08-03 RX ORDER — OMEPRAZOLE 20 MG/1
20 CAPSULE, DELAYED RELEASE ORAL DAILY
Qty: 90 CAPSULE | Refills: 3 | OUTPATIENT
Start: 2023-08-03

## 2023-11-15 ENCOUNTER — OFFICE VISIT (OUTPATIENT)
Dept: FAMILY MEDICINE CLINIC | Facility: CLINIC | Age: 38
End: 2023-11-15
Payer: COMMERCIAL

## 2023-11-15 ENCOUNTER — HOSPITAL ENCOUNTER (OUTPATIENT)
Dept: GENERAL RADIOLOGY | Facility: HOSPITAL | Age: 38
Discharge: HOME | End: 2023-11-15
Payer: COMMERCIAL

## 2023-11-15 VITALS
SYSTOLIC BLOOD PRESSURE: 115 MMHG | TEMPERATURE: 98.9 F | BODY MASS INDEX: 40.43 KG/M2 | HEART RATE: 98 BPM | HEIGHT: 74 IN | OXYGEN SATURATION: 97 % | RESPIRATION RATE: 16 BRPM | DIASTOLIC BLOOD PRESSURE: 76 MMHG | WEIGHT: 315 LBS

## 2023-11-15 DIAGNOSIS — M72.2 PLANTAR FASCIITIS OF RIGHT FOOT: Primary | ICD-10-CM

## 2023-11-15 PROCEDURE — 99213 OFFICE O/P EST LOW 20 MIN: CPT

## 2023-11-15 PROCEDURE — 73630 X-RAY EXAM OF FOOT: CPT

## 2023-11-15 NOTE — PROGRESS NOTES
"Chief Complaint  Foot Injury and Foot Pain    Subjective         Foot Injury   Pertinent negatives include no numbness.     The patient complains of right foot pain. The symptoms began  5-8 years ago .  Pain is a result of twisting his right ankle several times. Pain is located at the right arch region. Discomfort is described as aching. Symptoms are exacerbated by repetative movement , weight bearing, standing, and getting up to stand.  Evaluation to date: none. Therapy to date includes: ice and OTC NSAIDs.       Review of Systems   Constitutional:  Negative for appetite change, chills, fatigue and fever.   HENT:  Negative for congestion, sinus pressure and trouble swallowing.    Eyes:  Negative for visual disturbance.   Respiratory:  Negative for cough, chest tightness, shortness of breath and wheezing.    Cardiovascular:  Negative for chest pain, palpitations and leg swelling.   Gastrointestinal:  Negative for abdominal pain, constipation, diarrhea, nausea and vomiting.   Genitourinary:  Negative for dysuria, frequency and urgency.   Musculoskeletal:  Positive for arthralgias. Negative for gait problem, myalgias and neck pain.   Skin:  Negative for rash.   Neurological:  Negative for dizziness, syncope, weakness, light-headedness and numbness.   Psychiatric/Behavioral:  Negative for dysphoric mood. The patient is not nervous/anxious.         Objective   Vital Signs:   /76 (BP Location: Left arm, Patient Position: Sitting, Cuff Size: Adult)   Pulse 98   Temp 98.9 °F (37.2 °C) (Oral)   Resp 16   Ht 188 cm (74.02\")   Wt (!) 161 kg (355 lb 12.8 oz)   SpO2 97%   BMI 45.66 kg/m²        Physical Exam  Vitals and nursing note reviewed.   Constitutional:       General: He is not in acute distress.     Appearance: He is well-developed. He is not diaphoretic.   HENT:      Head: Normocephalic and atraumatic.   Eyes:      General: No scleral icterus.     Conjunctiva/sclera: Conjunctivae normal.      Pupils: " Pupils are equal, round, and reactive to light.   Cardiovascular:      Rate and Rhythm: Normal rate and regular rhythm.      Pulses: Normal pulses.           Dorsalis pedis pulses are 2+ on the right side.        Posterior tibial pulses are 2+ on the right side.   Pulmonary:      Effort: Pulmonary effort is normal.   Musculoskeletal:         General: Tenderness present. No deformity.      Cervical back: Normal range of motion and neck supple.      Right foot: Normal range of motion and normal capillary refill. Tenderness present. No deformity, foot drop, bony tenderness or crepitus.      Comments: Tenderness with palpation of medial aspect of right foot from heel upward.  No decreased sensation and no abnormal gait.  DP and PT pulses are intact and regular.   Feet:      Right foot:      Protective Sensation: 5 sites tested.  5 sites sensed.      Skin integrity: Skin integrity normal.      Toenail Condition: Right toenails are normal.      Comments: Normal position sense.  Skin:     General: Skin is warm and dry.      Findings: No rash.   Neurological:      Mental Status: He is alert and oriented to person, place, and time.      Sensory: Sensation is intact. No sensory deficit.      Motor: Motor function is intact. No weakness, tremor, atrophy or abnormal muscle tone.      Coordination: Coordination is intact. Coordination normal.      Gait: Gait normal.      Deep Tendon Reflexes: Reflexes are normal and symmetric. Reflexes normal.      Reflex Scores:       Patellar reflexes are 2+ on the right side and 2+ on the left side.       Achilles reflexes are 2+ on the right side and 2+ on the left side.  Psychiatric:         Behavior: Behavior normal.         Thought Content: Thought content normal.         Judgment: Judgment normal.                         Assessment and Plan      Diagnoses and all orders for this visit:    1. Plantar fasciitis of right foot (Primary)  Comments:  NSAID's, stretches as directed-info given  in MyChart  Foot X-ray to check for other possible causes  Rest, ice prn, and elevation  Return if no improvement  Orders:  -     XR Foot 3+ View Right            Follow Up     Return if symptoms worsen or fail to improve.    Patient was given instructions and counseling regarding his condition or for health maintenance advice. Please see specific information pulled into the AVS if appropriate.     -Return if no improvement.

## 2024-01-08 DIAGNOSIS — M72.2 PLANTAR FASCIITIS OF RIGHT FOOT: Primary | ICD-10-CM

## 2024-01-26 ENCOUNTER — HOSPITAL ENCOUNTER (OUTPATIENT)
Facility: HOSPITAL | Age: 39
Setting detail: OBSERVATION
Discharge: HOME | End: 2024-01-28
Attending: EMERGENCY MEDICINE | Admitting: INTERNAL MEDICINE
Payer: COMMERCIAL

## 2024-01-26 ENCOUNTER — APPOINTMENT (OUTPATIENT)
Dept: GENERAL RADIOLOGY | Facility: HOSPITAL | Age: 39
End: 2024-01-26
Payer: COMMERCIAL

## 2024-01-26 DIAGNOSIS — Z86.79 HISTORY OF HYPERTENSION: ICD-10-CM

## 2024-01-26 DIAGNOSIS — R07.89 ATYPICAL CHEST PAIN: Primary | ICD-10-CM

## 2024-01-26 DIAGNOSIS — Z86.39 HISTORY OF HYPERLIPIDEMIA: ICD-10-CM

## 2024-01-26 LAB
ALBUMIN SERPL-MCNC: 4.5 G/DL (ref 3.5–5.2)
ALBUMIN/GLOB SERPL: 1.6 G/DL
ALP SERPL-CCNC: 93 U/L (ref 39–117)
ALT SERPL W P-5'-P-CCNC: 48 U/L (ref 1–41)
ANION GAP SERPL CALCULATED.3IONS-SCNC: 12.7 MMOL/L (ref 5–15)
AST SERPL-CCNC: 36 U/L (ref 1–40)
BASOPHILS # BLD AUTO: 0.06 10*3/MM3 (ref 0–0.2)
BASOPHILS NFR BLD AUTO: 0.6 % (ref 0–1.5)
BILIRUB SERPL-MCNC: 0.2 MG/DL (ref 0–1.2)
BUN SERPL-MCNC: 12 MG/DL (ref 6–20)
BUN/CREAT SERPL: 14.6 (ref 7–25)
CALCIUM SPEC-SCNC: 9.1 MG/DL (ref 8.6–10.5)
CHLORIDE SERPL-SCNC: 102 MMOL/L (ref 98–107)
CO2 SERPL-SCNC: 22.3 MMOL/L (ref 22–29)
CREAT SERPL-MCNC: 0.82 MG/DL (ref 0.76–1.27)
DEPRECATED RDW RBC AUTO: 44.9 FL (ref 37–54)
EGFRCR SERPLBLD CKD-EPI 2021: 114.6 ML/MIN/1.73
EOSINOPHIL # BLD AUTO: 0.25 10*3/MM3 (ref 0–0.4)
EOSINOPHIL NFR BLD AUTO: 2.7 % (ref 0.3–6.2)
ERYTHROCYTE [DISTWIDTH] IN BLOOD BY AUTOMATED COUNT: 14.3 % (ref 12.3–15.4)
GEN 5 2HR TROPONIN T REFLEX: 18 NG/L
GLOBULIN UR ELPH-MCNC: 2.9 GM/DL
GLUCOSE SERPL-MCNC: 85 MG/DL (ref 65–99)
HCT VFR BLD AUTO: 40.2 % (ref 37.5–51)
HGB BLD-MCNC: 13 G/DL (ref 13–17.7)
HOLD SPECIMEN: NORMAL
HOLD SPECIMEN: NORMAL
IMM GRANULOCYTES # BLD AUTO: 0.03 10*3/MM3 (ref 0–0.05)
IMM GRANULOCYTES NFR BLD AUTO: 0.3 % (ref 0–0.5)
LYMPHOCYTES # BLD AUTO: 3.65 10*3/MM3 (ref 0.7–3.1)
LYMPHOCYTES NFR BLD AUTO: 39.2 % (ref 19.6–45.3)
MCH RBC QN AUTO: 28.4 PG (ref 26.6–33)
MCHC RBC AUTO-ENTMCNC: 32.3 G/DL (ref 31.5–35.7)
MCV RBC AUTO: 87.8 FL (ref 79–97)
MONOCYTES # BLD AUTO: 0.53 10*3/MM3 (ref 0.1–0.9)
MONOCYTES NFR BLD AUTO: 5.7 % (ref 5–12)
NEUTROPHILS NFR BLD AUTO: 4.79 10*3/MM3 (ref 1.7–7)
NEUTROPHILS NFR BLD AUTO: 51.5 % (ref 42.7–76)
NRBC BLD AUTO-RTO: 0 /100 WBC (ref 0–0.2)
PLATELET # BLD AUTO: 201 10*3/MM3 (ref 140–450)
PMV BLD AUTO: 10.8 FL (ref 6–12)
POTASSIUM SERPL-SCNC: 3.7 MMOL/L (ref 3.5–5.2)
PROT SERPL-MCNC: 7.4 G/DL (ref 6–8.5)
RBC # BLD AUTO: 4.58 10*6/MM3 (ref 4.14–5.8)
SODIUM SERPL-SCNC: 137 MMOL/L (ref 136–145)
TROPONIN T DELTA: 5 NG/L
TROPONIN T SERPL HS-MCNC: 13 NG/L
WBC NRBC COR # BLD AUTO: 9.31 10*3/MM3 (ref 3.4–10.8)
WHOLE BLOOD HOLD COAG: NORMAL
WHOLE BLOOD HOLD SPECIMEN: NORMAL

## 2024-01-26 PROCEDURE — 93005 ELECTROCARDIOGRAM TRACING: CPT

## 2024-01-26 PROCEDURE — 93010 ELECTROCARDIOGRAM REPORT: CPT | Performed by: INTERNAL MEDICINE

## 2024-01-26 PROCEDURE — 99284 EMERGENCY DEPT VISIT MOD MDM: CPT

## 2024-01-26 PROCEDURE — 71045 X-RAY EXAM CHEST 1 VIEW: CPT

## 2024-01-26 PROCEDURE — 85025 COMPLETE CBC W/AUTO DIFF WBC: CPT | Performed by: EMERGENCY MEDICINE

## 2024-01-26 PROCEDURE — 84484 ASSAY OF TROPONIN QUANT: CPT | Performed by: EMERGENCY MEDICINE

## 2024-01-26 PROCEDURE — 93005 ELECTROCARDIOGRAM TRACING: CPT | Performed by: EMERGENCY MEDICINE

## 2024-01-26 PROCEDURE — 80053 COMPREHEN METABOLIC PANEL: CPT | Performed by: EMERGENCY MEDICINE

## 2024-01-26 PROCEDURE — 36415 COLL VENOUS BLD VENIPUNCTURE: CPT

## 2024-01-26 RX ORDER — ALBUTEROL SULFATE 90 UG/1
2 AEROSOL, METERED RESPIRATORY (INHALATION) EVERY 4 HOURS PRN
COMMUNITY

## 2024-01-26 RX ORDER — SODIUM CHLORIDE 0.9 % (FLUSH) 0.9 %
10 SYRINGE (ML) INJECTION AS NEEDED
Status: DISCONTINUED | OUTPATIENT
Start: 2024-01-26 | End: 2024-01-28 | Stop reason: HOSPADM

## 2024-01-26 RX ORDER — ASPIRIN 325 MG
325 TABLET ORAL ONCE
Status: COMPLETED | OUTPATIENT
Start: 2024-01-26 | End: 2024-01-27

## 2024-01-27 ENCOUNTER — APPOINTMENT (OUTPATIENT)
Dept: NUCLEAR MEDICINE | Facility: HOSPITAL | Age: 39
End: 2024-01-27
Payer: COMMERCIAL

## 2024-01-27 ENCOUNTER — APPOINTMENT (OUTPATIENT)
Dept: CARDIOLOGY | Facility: HOSPITAL | Age: 39
End: 2024-01-27
Payer: COMMERCIAL

## 2024-01-27 PROBLEM — R07.9 CHEST PAIN: Status: ACTIVE | Noted: 2024-01-27

## 2024-01-27 LAB
AORTIC ARCH: 2.7 CM
AORTIC DIMENSIONLESS INDEX: 0.8 (DI)
ASCENDING AORTA: 3.4 CM
BH CV ECHO MEAS - AO MAX PG: 6.8 MMHG
BH CV ECHO MEAS - AO MEAN PG: 4 MMHG
BH CV ECHO MEAS - AO ROOT DIAM: 3.6 CM
BH CV ECHO MEAS - AO V2 MAX: 130 CM/SEC
BH CV ECHO MEAS - AO V2 VTI: 23.9 CM
BH CV ECHO MEAS - AVA(I,D): 3.9 CM2
BH CV ECHO MEAS - EDV(CUBED): 88.8 ML
BH CV ECHO MEAS - EDV(MOD-SP2): 132 ML
BH CV ECHO MEAS - EDV(MOD-SP4): 164 ML
BH CV ECHO MEAS - EF(MOD-BP): 72.4 %
BH CV ECHO MEAS - EF(MOD-SP2): 72 %
BH CV ECHO MEAS - EF(MOD-SP4): 72.6 %
BH CV ECHO MEAS - ESV(CUBED): 26.2 ML
BH CV ECHO MEAS - ESV(MOD-SP2): 37 ML
BH CV ECHO MEAS - ESV(MOD-SP4): 45 ML
BH CV ECHO MEAS - FS: 33.5 %
BH CV ECHO MEAS - IVS/LVPW: 1.13 CM
BH CV ECHO MEAS - IVSD: 1.29 CM
BH CV ECHO MEAS - LA DIMENSION: 3.9 CM
BH CV ECHO MEAS - LAT PEAK E' VEL: 13.6 CM/SEC
BH CV ECHO MEAS - LV DIASTOLIC VOL/BSA (35-75): 59.1 CM2
BH CV ECHO MEAS - LV MASS(C)D: 199.1 GRAMS
BH CV ECHO MEAS - LV MAX PG: 5.3 MMHG
BH CV ECHO MEAS - LV MEAN PG: 2 MMHG
BH CV ECHO MEAS - LV SYSTOLIC VOL/BSA (12-30): 16.2 CM2
BH CV ECHO MEAS - LV V1 MAX: 115 CM/SEC
BH CV ECHO MEAS - LV V1 VTI: 19.2 CM
BH CV ECHO MEAS - LVIDD: 4.5 CM
BH CV ECHO MEAS - LVIDS: 3 CM
BH CV ECHO MEAS - LVOT AREA: 4.9 CM2
BH CV ECHO MEAS - LVOT DIAM: 2.49 CM
BH CV ECHO MEAS - LVPWD: 1.14 CM
BH CV ECHO MEAS - MED PEAK E' VEL: 8.4 CM/SEC
BH CV ECHO MEAS - MV A DUR: 0.12 SEC
BH CV ECHO MEAS - MV A MAX VEL: 61.3 CM/SEC
BH CV ECHO MEAS - MV DEC SLOPE: 700.3 CM/SEC2
BH CV ECHO MEAS - MV DEC TIME: 0.21 SEC
BH CV ECHO MEAS - MV E MAX VEL: 77.6 CM/SEC
BH CV ECHO MEAS - MV E/A: 1.27
BH CV ECHO MEAS - MV MAX PG: 4.3 MMHG
BH CV ECHO MEAS - MV MEAN PG: 1.53 MMHG
BH CV ECHO MEAS - MV P1/2T: 49.5 MSEC
BH CV ECHO MEAS - MV V2 VTI: 24.4 CM
BH CV ECHO MEAS - MVA(P1/2T): 4.4 CM2
BH CV ECHO MEAS - MVA(VTI): 3.8 CM2
BH CV ECHO MEAS - PA ACC TIME: 0.1 SEC
BH CV ECHO MEAS - RAP SYSTOLE: 3 MMHG
BH CV ECHO MEAS - RVSP: 36.8 MMHG
BH CV ECHO MEAS - SI(MOD-SP2): 34.3 ML/M2
BH CV ECHO MEAS - SI(MOD-SP4): 42.9 ML/M2
BH CV ECHO MEAS - SUP REN AO DIAM: 2 CM
BH CV ECHO MEAS - SV(LVOT): 93.3 ML
BH CV ECHO MEAS - SV(MOD-SP2): 95 ML
BH CV ECHO MEAS - SV(MOD-SP4): 119 ML
BH CV ECHO MEAS - TAPSE (>1.6): 3.2 CM
BH CV ECHO MEAS - TR MAX PG: 33.8 MMHG
BH CV ECHO MEAS - TR MAX VEL: 290.9 CM/SEC
BH CV ECHO MEASUREMENTS AVERAGE E/E' RATIO: 7.05
BH CV XLRA - RV BASE: 4.1 CM
BH CV XLRA - RV LENGTH: 7.8 CM
BH CV XLRA - RV MID: 2.7 CM
BH CV XLRA - TDI S': 17.5 CM/SEC
D DIMER PPP FEU-MCNC: <0.27 MCGFEU/ML (ref 0–0.5)
QT INTERVAL: 443 MS
QT INTERVAL: 506 MS
QTC INTERVAL: 472 MS
QTC INTERVAL: 558 MS
SINUS: 3.7 CM
STJ: 3.2 CM
TROPONIN T SERPL HS-MCNC: 10 NG/L

## 2024-01-27 PROCEDURE — A9500 TC99M SESTAMIBI: HCPCS | Performed by: INTERNAL MEDICINE

## 2024-01-27 PROCEDURE — 99223 1ST HOSP IP/OBS HIGH 75: CPT | Performed by: INTERNAL MEDICINE

## 2024-01-27 PROCEDURE — G0378 HOSPITAL OBSERVATION PER HR: HCPCS

## 2024-01-27 PROCEDURE — 94762 N-INVAS EAR/PLS OXIMTRY CONT: CPT

## 2024-01-27 PROCEDURE — 93306 TTE W/DOPPLER COMPLETE: CPT

## 2024-01-27 PROCEDURE — 78452 HT MUSCLE IMAGE SPECT MULT: CPT | Performed by: INTERNAL MEDICINE

## 2024-01-27 PROCEDURE — 93018 CV STRESS TEST I&R ONLY: CPT | Performed by: INTERNAL MEDICINE

## 2024-01-27 PROCEDURE — 85379 FIBRIN DEGRADATION QUANT: CPT | Performed by: INTERNAL MEDICINE

## 2024-01-27 PROCEDURE — 84484 ASSAY OF TROPONIN QUANT: CPT | Performed by: INTERNAL MEDICINE

## 2024-01-27 PROCEDURE — 0 TECHNETIUM SESTAMIBI: Performed by: INTERNAL MEDICINE

## 2024-01-27 PROCEDURE — 93010 ELECTROCARDIOGRAM REPORT: CPT | Performed by: INTERNAL MEDICINE

## 2024-01-27 PROCEDURE — 36415 COLL VENOUS BLD VENIPUNCTURE: CPT | Performed by: INTERNAL MEDICINE

## 2024-01-27 PROCEDURE — 78452 HT MUSCLE IMAGE SPECT MULT: CPT

## 2024-01-27 PROCEDURE — 93017 CV STRESS TEST TRACING ONLY: CPT

## 2024-01-27 PROCEDURE — 93306 TTE W/DOPPLER COMPLETE: CPT | Performed by: INTERNAL MEDICINE

## 2024-01-27 PROCEDURE — 93005 ELECTROCARDIOGRAM TRACING: CPT | Performed by: INTERNAL MEDICINE

## 2024-01-27 PROCEDURE — 25510000001 PERFLUTREN (DEFINITY) 8.476 MG IN SODIUM CHLORIDE (PF) 0.9 % 10 ML INJECTION: Performed by: INTERNAL MEDICINE

## 2024-01-27 PROCEDURE — 93016 CV STRESS TEST SUPVJ ONLY: CPT | Performed by: INTERNAL MEDICINE

## 2024-01-27 RX ORDER — ACETAMINOPHEN 325 MG/1
650 TABLET ORAL EVERY 8 HOURS PRN
Status: DISCONTINUED | OUTPATIENT
Start: 2024-01-27 | End: 2024-01-28 | Stop reason: HOSPADM

## 2024-01-27 RX ORDER — LOSARTAN POTASSIUM 50 MG/1
50 TABLET ORAL
Status: DISCONTINUED | OUTPATIENT
Start: 2024-01-27 | End: 2024-01-28

## 2024-01-27 RX ORDER — NITROGLYCERIN 0.4 MG/1
0.4 TABLET SUBLINGUAL
Status: DISCONTINUED | OUTPATIENT
Start: 2024-01-27 | End: 2024-01-28 | Stop reason: HOSPADM

## 2024-01-27 RX ADMIN — ASPIRIN 325 MG: 325 TABLET ORAL at 00:03

## 2024-01-27 RX ADMIN — TECHNETIUM TC 99M SESTAMIBI 1 DOSE: 1 INJECTION INTRAVENOUS at 14:00

## 2024-01-27 RX ADMIN — PERFLUTREN 3 ML: 6.52 INJECTION, SUSPENSION INTRAVENOUS at 18:03

## 2024-01-27 RX ADMIN — TECHNETIUM TC 99M SESTAMIBI 1 DOSE: 1 INJECTION INTRAVENOUS at 11:47

## 2024-01-27 RX ADMIN — LOSARTAN POTASSIUM 50 MG: 50 TABLET, FILM COATED ORAL at 12:08

## 2024-01-27 RX ADMIN — ACETAMINOPHEN 650 MG: 325 TABLET ORAL at 16:48

## 2024-01-27 NOTE — ED NOTES
Nursing report ED to floor  Fazal Tavarez  38 y.o.  male    HPI :   Chief Complaint   Patient presents with    Chest Pain    Dizziness       Admitting doctor:   Amber Figueroa MD    Admitting diagnosis:   The primary encounter diagnosis was Atypical chest pain. Diagnoses of History of hypertension and History of hyperlipidemia were also pertinent to this visit.    Code status:   Current Code Status       Date Active Code Status Order ID Comments User Context       Not on file            Allergies:   Lisinopril    Isolation:   No active isolations    Intake and Output  No intake or output data in the 24 hours ending 01/27/24 0048    Weight:       01/26/24 1934   Weight: (!) 166 kg (365 lb)       Most recent vitals:   Vitals:    01/26/24 2131 01/26/24 2231 01/26/24 2301 01/27/24 0000   BP: 158/85 166/97 174/96 135/81   BP Location: Right arm      Patient Position: Sitting      Pulse:  66 72 75   Resp:       Temp:       TempSrc:       SpO2:  94% 94% 97%   Weight:       Height:           Active LDAs/IV Access:   Lines, Drains & Airways       Active LDAs       Name Placement date Placement time Site Days    Peripheral IV 01/26/24 1937 Anterior;Right Forearm 01/26/24 1937  Forearm  less than 1                    Labs (abnormal labs have a star):   Labs Reviewed   COMPREHENSIVE METABOLIC PANEL - Abnormal; Notable for the following components:       Result Value    ALT (SGPT) 48 (*)     All other components within normal limits    Narrative:     GFR Normal >60  Chronic Kidney Disease <60  Kidney Failure <15     CBC WITH AUTO DIFFERENTIAL - Abnormal; Notable for the following components:    Lymphocytes, Absolute 3.65 (*)     All other components within normal limits   HIGH SENSITIVITIY TROPONIN T 2HR - Abnormal; Notable for the following components:    Troponin T Delta 5 (*)     All other components within normal limits    Narrative:     High Sensitive Troponin T Reference Range:  <14.0 ng/L- Negative Female for AMI  <22.0  ng/L- Negative Male for AMI  >=14 - Abnormal Female indicating possible myocardial injury.  >=22 - Abnormal Male indicating possible myocardial injury.   Clinicians would have to utilize clinical acumen, EKG, Troponin, and serial changes to determine if it is an Acute Myocardial Infarction or myocardial injury due to an underlying chronic condition.        TROPONIN - Normal    Narrative:     High Sensitive Troponin T Reference Range:  <14.0 ng/L- Negative Female for AMI  <22.0 ng/L- Negative Male for AMI  >=14 - Abnormal Female indicating possible myocardial injury.  >=22 - Abnormal Male indicating possible myocardial injury.   Clinicians would have to utilize clinical acumen, EKG, Troponin, and serial changes to determine if it is an Acute Myocardial Infarction or myocardial injury due to an underlying chronic condition.        RAINBOW DRAW    Narrative:     The following orders were created for panel order [place] Draw.  Procedure                               Abnormality         Status                     ---------                               -----------         ------                     Green Top (Gel)[[id number]]                                  Final result               Lavender Top[[id number]]                                     Final result               Gold Top - SST[[id number]]                                   Final result               Light Blue Top[[id number]]                                   Final result                 Please view results for these tests on the individual orders.   CBC AND DIFFERENTIAL    Narrative:     The following orders were created for panel order CBC & Differential.  Procedure                               Abnormality         Status                     ---------                               -----------         ------                     CBC Auto Differential[[id number]]        Abnormal            Final result                 Please view results for these tests on the  individual orders.   GREEN TOP   LAVENDER TOP   GOLD TOP - SST   LIGHT BLUE TOP       EKG:   ECG 12 Lead Chest Pain   Preliminary Result   HEART RATE= 73  bpm   RR Interval= 822  ms   IA Interval= 143  ms   P Horizontal Axis= 21  deg   P Front Axis= 9  deg   QRSD Interval= 111  ms   QT Interval= 506  ms   QTcB= 558  ms   QRS Axis= 66  deg   T Wave Axis= 22  deg   - ABNORMAL ECG -   Sinus rhythm   Borderline repolarization abnormality   Prolonged QT interval   Electronically Signed By:    Date and Time of Study: 2024-01-26 19:23:30          Meds given in ED:   Medications   sodium chloride 0.9 % flush 10 mL (has no administration in time range)   aspirin tablet 325 mg (325 mg Oral Given 1/27/24 0003)       Imaging results:  XR Chest 1 View    Result Date: 1/26/2024  No evidence for acute pulmonary process. Follow-up as clinical indications persist.  This report was finalized on 1/26/2024 8:14 PM by Dr. Antoine Louis M.D on Workstation: [employer]       Ambulatory status:   - ax1    Social issues:   Social History     Socioeconomic History    Marital status: Single   Tobacco Use    Smoking status: Former     Packs/day: 1.50     Years: 10.00     Additional pack years: 0.00     Total pack years: 15.00     Types: Cigarettes, Electronic Cigarette    Smokeless tobacco: Never    Tobacco comments:     quit e cigarettes in 2019.    Substance and Sexual Activity    Alcohol use: Yes     Comment: 12 pack a week    Drug use: No    Sexual activity: Defer       NIH Stroke Scale:       Maday Perry RN  01/27/24 00:48 EST

## 2024-01-27 NOTE — ED PROVIDER NOTES
EMERGENCY DEPARTMENT ENCOUNTER    Room Number:  17/17  PCP: Kyleigh Potter APRN ([place])  Historian: Patient      HPI:  Chief Complaint: Chest pain  A complete HPI/ROS/PMH/PSH/SH/FH are unobtainable due to: None    Context: Fazal Tavarez is a 38 y.o. male who presents to the ED via private vehicle c/o acute intermittent chest pain that started around noon today, anterior chest without radiation.  No aggravating relieving factors.  Described as a sharp sensation, may be some mild lightheadedness or shortness of breath.  No recent travel or recent surgeries, no recent fevers, chills, cough, vomiting, diarrhea.  Currently asymptomatic.      MEDICAL RECORD REVIEW    External (non-ED) record review: No prior cardiac workup noted on chart review in epic              PAST MEDICAL HISTORY  Active Ambulatory Problems     Diagnosis Date Noted    Calcaneal spur of foot 06/05/2014    DDD (degenerative disc disease), lumbar 06/05/2014    Essential hypertension 04/05/2022    Gastroesophageal reflux disease without esophagitis 04/05/2022     Resolved Ambulatory Problems     Diagnosis Date Noted    Perianal abscess 04/04/2018     Past Medical History:   Diagnosis Date    Anemia     COVID-19 12/01/2021    External hemorrhoid     GERD (gastroesophageal reflux disease)     Hyperlipidemia     Hypertension     Perirectal abscess     Rectal bleeding     Seasonal allergies          PAST SURGICAL HISTORY  Past Surgical History:   Procedure Laterality Date    COLONOSCOPY W/ POLYPECTOMY N/A 3/15/2017    Procedure: COLONOSCOPY TO CECUM AND TERMINAL ILEUM;  Surgeon: Pratik Kaur MD;  Location: [facility] ENDOSCOPY;  Service:     ENDOSCOPY N/A 3/15/2017    Procedure: ESOPHAGOGASTRODUODENOSCOPY WITH BIOPSIES;  Surgeon: Pratik Kaur MD;  Location: [facility] ENDOSCOPY;  Service:     INCISION AND DRAINAGE PERIRECTAL ABSCESS N/A 4/4/2018    Procedure: INCISION AND DRAINAGE OF PERIRECTAL ABSCESS;  Surgeon: Denise White MD;  Location:  [facility] MAIN OR;  Service: General    WISDOM TOOTH EXTRACTION           FAMILY HISTORY  Family History   Problem Relation Age of Onset    Diabetes Father     Diabetes Paternal Uncle          SOCIAL HISTORY  Social History     Socioeconomic History    Marital status: Single   Tobacco Use    Smoking status: Former     Packs/day: 1.50     Years: 10.00     Additional pack years: 0.00     Total pack years: 15.00     Types: Cigarettes, Electronic Cigarette    Smokeless tobacco: Never    Tobacco comments:     quit e cigarettes in 2019.    Substance and Sexual Activity    Alcohol use: Yes     Comment: 12 pack a week    Drug use: No    Sexual activity: Defer         ALLERGIES  Lisinopril        REVIEW OF SYSTEMS  Review of Systems     All systems reviewed and negative except for those discussed in HPI.       PHYSICAL EXAM    I have reviewed the triage vital signs and nursing notes.    ED Triage Vitals   Temp Heart Rate Resp BP SpO2   01/26/24 1915 01/26/24 1914 01/26/24 1914 01/26/24 1934 01/26/24 1914   98.6 °F (37 °C) 82 18 147/90 98 %      Temp src Heart Rate Source Patient Position BP Location FiO2 (%)   01/26/24 1915 -- -- -- --   Tympanic           Physical Exam  General: No acute distress, nontoxic  HEENT: Mucous membranes moist, atraumatic, EOMI  Neck: Full ROM  Pulm: Symmetric chest rise, nonlabored, lungs CTAB  Cardiovascular: Regular rate and rhythm, intact distal pulses, no peripheral edema  GI: Soft, nontender, nondistended, no rebound, no guarding, bowel sounds present  MSK: Full ROM, no deformity, reproducible costosternal border tenderness to palpation on the left  Skin: Warm, dry  Neuro: Awake, alert, oriented x 4, GCS 15, moving all extremities, no focal deficits  Psych: Calm, cooperative        LAB RESULTS  Recent Results (from the past 24 hour(s))   ECG 12 Lead Chest Pain    Collection Time: 01/26/24  7:23 PM   Result Value Ref Range    QT Interval 506 ms    QTC Interval 558 ms   Comprehensive Metabolic  Panel    Collection Time: 01/26/24  7:33 PM    Specimen: Blood   Result Value Ref Range    Glucose 85 65 - 99 mg/dL    BUN 12 6 - 20 mg/dL    Creatinine 0.82 0.76 - 1.27 mg/dL    Sodium 137 136 - 145 mmol/L    Potassium 3.7 3.5 - 5.2 mmol/L    Chloride 102 98 - 107 mmol/L    CO2 22.3 22.0 - 29.0 mmol/L    Calcium 9.1 8.6 - 10.5 mg/dL    Total Protein 7.4 6.0 - 8.5 g/dL    Albumin 4.5 3.5 - 5.2 g/dL    ALT (SGPT) 48 (H) 1 - 41 U/L    AST (SGOT) 36 1 - 40 U/L    Alkaline Phosphatase 93 39 - 117 U/L    Total Bilirubin 0.2 0.0 - 1.2 mg/dL    Globulin 2.9 gm/dL    A/G Ratio 1.6 g/dL    BUN/Creatinine Ratio 14.6 7.0 - 25.0    Anion Gap 12.7 5.0 - 15.0 mmol/L    eGFR 114.6 >60.0 mL/min/1.73   High Sensitivity Troponin T    Collection Time: 01/26/24  7:33 PM    Specimen: Blood   Result Value Ref Range    HS Troponin T 13 <22 ng/L   Green Top (Gel)    Collection Time: 01/26/24  7:33 PM   Result Value Ref Range    Extra Tube Hold for add-ons.    Lavender Top    Collection Time: 01/26/24  7:33 PM   Result Value Ref Range    Extra Tube hold for add-on    Gold Top - SST    Collection Time: 01/26/24  7:33 PM   Result Value Ref Range    Extra Tube Hold for add-ons.    Light Blue Top    Collection Time: 01/26/24  7:33 PM   Result Value Ref Range    Extra Tube Hold for add-ons.    CBC Auto Differential    Collection Time: 01/26/24  7:33 PM    Specimen: Blood   Result Value Ref Range    WBC 9.31 3.40 - 10.80 10*3/mm3    RBC 4.58 4.14 - 5.80 10*6/mm3    Hemoglobin 13.0 13.0 - 17.7 g/dL    Hematocrit 40.2 37.5 - 51.0 %    MCV 87.8 79.0 - 97.0 fL    MCH 28.4 26.6 - 33.0 pg    MCHC 32.3 31.5 - 35.7 g/dL    RDW 14.3 12.3 - 15.4 %    RDW-SD 44.9 37.0 - 54.0 fl    MPV 10.8 6.0 - 12.0 fL    Platelets 201 140 - 450 10*3/mm3    Neutrophil % 51.5 42.7 - 76.0 %    Lymphocyte % 39.2 19.6 - 45.3 %    Monocyte % 5.7 5.0 - 12.0 %    Eosinophil % 2.7 0.3 - 6.2 %    Basophil % 0.6 0.0 - 1.5 %    Immature Grans % 0.3 0.0 - 0.5 %    Neutrophils,  Absolute 4.79 1.70 - 7.00 10*3/mm3    Lymphocytes, Absolute 3.65 (H) 0.70 - 3.10 10*3/mm3    Monocytes, Absolute 0.53 0.10 - 0.90 10*3/mm3    Eosinophils, Absolute 0.25 0.00 - 0.40 10*3/mm3    Basophils, Absolute 0.06 0.00 - 0.20 10*3/mm3    Immature Grans, Absolute 0.03 0.00 - 0.05 10*3/mm3    nRBC 0.0 0.0 - 0.2 /100 WBC   High Sensitivity Troponin T 2Hr    Collection Time: 01/26/24 10:35 PM    Specimen: Blood   Result Value Ref Range    HS Troponin T 18 <22 ng/L    Troponin T Delta 5 (C) >=-4 - <+4 ng/L       Ordered the above labs and independently interpreted results. My findings will be discussed in the medical decision making section below        RADIOLOGY  XR Chest 1 View    Result Date: 1/26/2024  XR CHEST 1 VW-  HISTORY: Male who is 38 years-old, chest pain  TECHNIQUE: Frontal views of the chest  COMPARISON: None available  FINDINGS: Heart, mediastinum and pulmonary vasculature are unremarkable. No focal pulmonary consolidation, pleural effusion, or pneumothorax. No acute osseous process.      No evidence for acute pulmonary process. Follow-up as clinical indications persist.  This report was finalized on 1/26/2024 8:14 PM by Dr. Antoine Louis M.D on Workstation: [id number]       Ordered the above noted radiological studies.  Independently interpreted by me and my independent review of findings can be found in the ED Course.  See dictation for official radiology interpretation.      PROCEDURES    Procedures      EKG - Per my independent interpretation at 1930:    EKG Time: 1923  Rhythm/Rate: Sinus rhythm with a rate of 73  Normal axis  QRS of 111, QTc of 558  Borderline nonspecific repolarization abnormalities   No STEMI       No prior for comparison      MEDICATIONS GIVEN IN ER    Medications   sodium chloride 0.9 % flush 10 mL (has no administration in time range)   aspirin tablet 325 mg (325 mg Oral Given 1/27/24 0003)         PROGRESS, DATA ANALYSIS, CONSULTS, AND MEDICAL DECISION MAKING    Please  note that this section constitutes my independent interpretation of clinical data including lab results, radiology, EKG's.  This constitutes my independent professional opinion regarding differential diagnosis and management of this patient.  It may include any factors such as history from outside sources, review of external records, social determinants of health, management of medications, response to those treatments, and discussions with other providers.    Differential Diagnosis and Plan: HEART score 3 (pre-troponin).  Initial concern for musculoskeletal chest wall issue including costochondritis, arrhythmia, dehydration, renal failure, electrolyte abnormalities, anemia, among others.  Lower concern at this point for ACS, PE, pneumothorax, aortic dissection.  Plan for reevaluation with results.    Additional sources:  - Discussed/ obtained information from independent historians:       - (Social Determinants of Health): None     - Shared decision making:  Patient and family at bedside fully updated on and in agreement with the course and plan moving forward    ED Course as of 01/27/24 0017   Fri Jan 26, 2024 2000 WBC: 9.31 [DC]   2000 Hemoglobin: 13.0 [DC]   2000 Platelets: 201 [DC]   2000 XR Chest 1 View  Per my independent interpretation of the chest x-ray, no evidence of pneumothorax [DC]   2015 HS Troponin T: 13 [DC]   2015 Glucose: 85 [DC]   2015 BUN: 12 [DC]   2015 Creatinine: 0.82 [DC]   2015 Sodium: 137 [DC]   2015 Potassium: 3.7 [DC]   2015 ALT (SGPT)(!): 48 [DC]   2015 AST (SGOT): 36 [DC]   2015 Alkaline Phosphatase: 93 [DC]   2015 Total Bilirubin: 0.2 [DC]   2306 HS Troponin T: 18 [DC]   2306 Troponin T Delta(!!): 5 [DC]   Sat Jan 27, 2024   0004 Discussed with Dr. Figueroa, cardiology, she states that hospitalization for stress test in the morning versus a third enzyme and reevaluation are both reasonable, will rediscuss with patient. [DC]   0004 Updated the patient, at this point he would rather just  stay for the stress test, I will admit to Dr. Daniel [DC]      ED Course User Index  [DC] Ken Guillen MD       Hospitalization Considered?: yes    Orders Placed During This Visit:  Orders Placed This Encounter   Procedures    XR Chest 1 View    [place] Draw    Comprehensive Metabolic Panel    High Sensitivity Troponin T    CBC Auto Differential    High Sensitivity Troponin T 2Hr    NPO Diet NPO Type: Strict NPO    Undress & Gown    Continuous Pulse Oximetry    LCG (on-call MD unless specified)    Oxygen Therapy- Nasal Cannula; Titrate 1-6 LPM Per SpO2; 90 - 95%    ECG 12 Lead Chest Pain    ECG 12 Lead ED Triage Standing Order; Chest Pain    Insert Peripheral IV    Initiate Observation Status    CBC & Differential    Green Top (Gel)    Lavender Top    Gold Top - SST    Light Blue Top       Additional orders considered but not placed:      Independent interpretation of labs, radiology studies, and discussions with consultants: See ED Course        AS OF 00:17 EST VITALS:    BP - 135/81  HR - 75  TEMP - 98.6 °F (37 °C) (Tympanic)  02 SATS - 97%          DIAGNOSIS  Final diagnoses:   Atypical chest pain   History of hypertension   History of hyperlipidemia         DISPOSITION  ED Disposition       ED Disposition   Decision to Admit    Condition   --    Comment   Level of Care: Telemetry [5]   Diagnosis: Chest pain [314559]   Admitting Physician: PAULINO DANIEL [[id number]]   Attending Physician: PAULINO DANIEL [[id number]]                  Please note that portions of this document were completed with a voice recognition program.    Note Disclaimer: At [facility], we believe that sharing information builds trust and better relationships. You are receiving this note because you recently visited [facility]. It is possible you will see health information before a provider has talked with you about it. This kind of information can be easy to misunderstand. To help you fully understand what it means for your health, we urge you to  discuss this note with your provider.                       Ken Guillen MD  01/27/24 0017

## 2024-01-27 NOTE — H&P
[facility]  History & Physical                                                                                  1/27/2024    Patient Identification:  Fazal Tavarez:   38 y.o.  male  1985     Date of Admission:1/26/2024    CC:   chest pain     HPI:  Mr. Fazal Tavarez is a 38 year old patient with a history significant for hypertension, hyperlipidemia, GERD, MARC, obesity and lumbar degenerative disc disease. He does not follow with a cardiologist.     He presented to the ER on 1/26/2024 with complaints of intermittent chest pain that started earlier that afternoon. He describes the pain as a sharp sensation in the anterior of his chest. He reported associated shortness of breath and mild lightheadedness. Also notes that this started after tachycardia at rates 112 bpm on his watch then with chest pain while eating lunch. He rates it as 3-4/10 and pleuritic. Rapid HR resolved after 10 min. Chest pain then occurred intermittent since then and completely resolved after 12 hours. There are no aggravating or alleviating factors. Denied recent travel, sick contacts, chills, cough, NVD. While in the ER he was asymptomatic. Vitals: /90, HR 82. Labs unremarkable with the exception of ALT of 48. HS troponin 13--> 18. EKG ST, rate 73, borderline nonspecific repolarization abnormalities. Patient is admitted for further evaluation.     He has had no further symptoms overnight.  He has been resting quietly in his room.      Past Medical History:  Past Medical History:   Diagnosis Date    Anemia     COVID-19 12/01/2021    External hemorrhoid     GERD (gastroesophageal reflux disease)     Hyperlipidemia     Hypertension     Perirectal abscess     Rectal bleeding     Seasonal allergies        Past Surgical History:  Past Surgical History:   Procedure Laterality Date    COLONOSCOPY W/ POLYPECTOMY N/A 3/15/2017    Procedure: COLONOSCOPY TO CECUM AND TERMINAL ILEUM;  Surgeon: Pratik Kaur MD;   Location: [facility] ENDOSCOPY;  Service:     ENDOSCOPY N/A 3/15/2017    Procedure: ESOPHAGOGASTRODUODENOSCOPY WITH BIOPSIES;  Surgeon: Pratik Kaur MD;  Location: [facility] ENDOSCOPY;  Service:     INCISION AND DRAINAGE PERIRECTAL ABSCESS N/A 4/4/2018    Procedure: INCISION AND DRAINAGE OF PERIRECTAL ABSCESS;  Surgeon: Denise White MD;  Location: [facility] MAIN OR;  Service: General    WISDOM TOOTH EXTRACTION         Allergies:  Allergies   Allergen Reactions    Lisinopril Cough       Home Meds:  Medications Prior to Admission   Medication Sig Dispense Refill Last Dose    albuterol sulfate  (90 Base) MCG/ACT inhaler Inhale 2 puffs Every 4 (Four) Hours As Needed for Wheezing.   1/26/2024    azelastine (ASTELIN) 0.1 % nasal spray INSTILL 2 SPRAYS IN EACH NOSTRIL TWICE DAILY   1/26/2024    cetirizine (zyrTEC) 10 MG tablet Take 1 tablet by mouth Daily.   1/26/2024    Flonase Sensimist 27.5 MCG/SPRAY nasal spray 1 spray 2 (Two) Times a Day.   1/26/2024    ketotifen (ZADITOR) 0.025 % ophthalmic solution Administer 1 drop to both eyes 2 (Two) Times a Day.       losartan (COZAAR) 50 MG tablet Take 1 tablet by mouth Daily. 90 tablet 3 1/26/2024    nadolol (CORGARD) 20 MG tablet 1 tablet.   1/26/2024    omeprazole (priLOSEC) 20 MG capsule Take 1 capsule by mouth Daily. 90 capsule 3 1/26/2024    Symbicort 80-4.5 MCG/ACT inhaler USE 2 INHALATIONS BY MOUTH TWICE DAILY   1/26/2024       Social History:   Social History     Socioeconomic History    Marital status: Single   Tobacco Use    Smoking status: Former     Packs/day: 1.50     Years: 10.00     Additional pack years: 0.00     Total pack years: 15.00     Types: Cigarettes, Electronic Cigarette    Smokeless tobacco: Never    Tobacco comments:     quit e cigarettes in 2019.    Vaping Use    Vaping Use: Never used   Substance and Sexual Activity    Alcohol use: Yes     Alcohol/week: 2.0 standard drinks of alcohol     Types: 2 Cans of beer per week    Drug use: No     "Sexual activity: Defer       Family History:  Family History   Problem Relation Age of Onset    Diabetes Father     Diabetes Paternal Uncle        REVIEW OF SYSTEMS:   CONSTITUTIONAL: No weight loss, fever, chills, weakness or fatigue.   HEENT: Eyes: No visual loss, blurred vision, double vision or yellow sclerae. Ears, Nose, Throat: No hearing loss, sneezing, congestion, runny nose or sore throat.   SKIN: No rash or itching.     RESPIRATORY: No hemoptysis, cough or sputum.   GASTROINTESTINAL: No anorexia, nausea, vomiting or diarrhea. No abdominal pain, bright red blood per rectum or melena.  GENITOURINARY: No burning on urination, hematuria or increased frequency.  NEUROLOGICAL: No headache, dizziness, syncope, paralysis, ataxia, numbness or tingling in the extremities. No change in bowel or bladder control.   MUSCULOSKELETAL: No muscle, back pain, joint pain or stiffness.   HEMATOLOGIC: No anemia, bleeding or bruising.   LYMPHATICS: No enlarged nodes. No history of splenectomy.   PSYCHIATRIC: No history of depression, anxiety, hallucinations.   ENDOCRINOLOGIC: No reports of sweating, cold or heat intolerance. No polyuria or polydipsia.     Physical Exam    /72 (BP Location: Left arm, Patient Position: Lying)   Pulse 62   Temp 97.9 °F (36.6 °C) (Oral)   Resp 16   Ht 188 cm (74\")   Wt 117 kg (258 lb 2.5 oz)   SpO2 96%   BMI 33.15 kg/m²     General Appearance Well developed, cooperative and well nourished and no acute distress   Head Normocephalic, without abnormality, atraumatic   Ears Ears appear intact with no abnormalities noted   Throat No oral lesions, no thrush, oral mucosa moist   Neck No adenopathy, supple, trachea midline, no thyromegaly, no carotid bruit, no JVD   Back No skin lesions, erythema or scars, no tenderness to percussion or palpation, range of motion is normal   Lungs Clear to auscultation, respirations regular, even and unlabored   Heart Regular rhythm and normal rate, normal S1 " and S2, no murmur, no gallop, no rub, no click   Chest wall No abnormalities observed   Abdomen Normal bowel sounds, no masses, no hepatomegaly,    Extremities Moves all extremities well, no edema, no cyanosis, no redness   Pulses Pulses palpable and equal bilaterally. Normal radial, carotid, femoral, dorsalis pedis and posterior tibial pulses bilaterally.    Skin No bleeding, bruising or rash   Psychiatric Alert and oriented x 3, normal mood and affect      Results from last 7 days   Lab Units 01/26/24  1933   SODIUM mmol/L 137   POTASSIUM mmol/L 3.7   CHLORIDE mmol/L 102   CO2 mmol/L 22.3   BUN mg/dL 12   CREATININE mg/dL 0.82   CALCIUM mg/dL 9.1   BILIRUBIN mg/dL 0.2   ALK PHOS U/L 93   ALT (SGPT) U/L 48*   AST (SGOT) U/L 36   GLUCOSE mg/dL 85     Results from last 7 days   Lab Units 01/27/24  0311 01/26/24  2235 01/26/24  1933   HSTROP T ng/L 10 18 13     Results from last 7 days   Lab Units 01/26/24  1933   WBC 10*3/mm3 9.31   HEMOGLOBIN g/dL 13.0   HEMATOCRIT % 40.2   PLATELETS 10*3/mm3 201         I personally viewed and interpreted the patient's EKG/Telemetry data    Assessment and Plan  1.  Chest pain.  This is occurring in the setting of tachycardia.  He also has some pleuritic components.  Will check a D-dimer, echocardiogram and then an exercise Cardiolite stress test.    2.  Tachycardia. Check an echo and will need to treat sleep apnea.  Also may need further titration of antihypertensive regimen. Will place a 2-week Ziopatch at dismissal.  He notes that his alarm is going off when he tries to go to sleep.  So far no arrhythmia noted but will check for hypoxia with nursing staff.  If there is significant hypoxia may need to have sleep medicine to see him.  For the group home.  His sleep apnea was quite severe.  3.  Hyperlipidemia  3.  Hypertension.  For now continue with the current regimen.  4.  Obesity  5.  History of elevated liver function test with history of fatty infiltration of the liver.   Reminded him that untreated sleep apnea can also cause hepatic dysfunction.  6.  Asthma  7.  Reflux disease  8.  Untreated MARC. Not using as no insurance coverage for supplies.  He will follow-up with sleep medicine at this level to address further.        Mini Figueroa  1/27/2024  06:18 EST    60min spent in reviewing records, discussion and examination of the patient and discussion with other members of the patient's medical team.     Dictated utilizing Dragon dictation

## 2024-01-28 ENCOUNTER — APPOINTMENT (OUTPATIENT)
Dept: CARDIOLOGY | Facility: HOSPITAL | Age: 39
End: 2024-01-28
Payer: COMMERCIAL

## 2024-01-28 ENCOUNTER — READMISSION MANAGEMENT (OUTPATIENT)
Dept: CALL CENTER | Facility: HOSPITAL | Age: 39
End: 2024-01-28
Payer: COMMERCIAL

## 2024-01-28 VITALS
HEIGHT: 74 IN | TEMPERATURE: 98 F | RESPIRATION RATE: 18 BRPM | HEART RATE: 68 BPM | BODY MASS INDEX: 40.43 KG/M2 | SYSTOLIC BLOOD PRESSURE: 147 MMHG | DIASTOLIC BLOOD PRESSURE: 88 MMHG | OXYGEN SATURATION: 95 % | WEIGHT: 315 LBS

## 2024-01-28 LAB
BH CV REST NUCLEAR ISOTOPE DOSE: 9 MCI
BH CV STRESS BP STAGE 1: NORMAL
BH CV STRESS BP STAGE 2: NORMAL
BH CV STRESS DURATION MIN STAGE 1: 3
BH CV STRESS DURATION MIN STAGE 2: 2
BH CV STRESS DURATION SEC STAGE 1: 0
BH CV STRESS DURATION SEC STAGE 2: 0
BH CV STRESS GRADE STAGE 1: 10
BH CV STRESS GRADE STAGE 2: 12
BH CV STRESS HR STAGE 1: 141
BH CV STRESS HR STAGE 2: 158
BH CV STRESS METS STAGE 1: 5
BH CV STRESS METS STAGE 2: 7.5
BH CV STRESS NUCLEAR ISOTOPE DOSE: 30 MCI
BH CV STRESS PROTOCOL 1: NORMAL
BH CV STRESS RECOVERY BP: NORMAL MMHG
BH CV STRESS RECOVERY HR: 99 BPM
BH CV STRESS SPEED STAGE 1: 1.7
BH CV STRESS SPEED STAGE 2: 2.5
BH CV STRESS STAGE 1: 1
BH CV STRESS STAGE 2: 2
LV EF NUC BP: 58 %
MAXIMAL PREDICTED HEART RATE: 181 BPM
PERCENT MAX PREDICTED HR: 88.4 %
STRESS BASELINE BP: NORMAL MMHG
STRESS BASELINE HR: 71 BPM
STRESS PERCENT HR: 104 %
STRESS POST PEAK BP: NORMAL MMHG
STRESS POST PEAK HR: 160 BPM
STRESS TARGET HR: 154 BPM

## 2024-01-28 PROCEDURE — 99238 HOSP IP/OBS DSCHRG MGMT 30/<: CPT | Performed by: INTERNAL MEDICINE

## 2024-01-28 PROCEDURE — 96372 THER/PROPH/DIAG INJ SC/IM: CPT

## 2024-01-28 PROCEDURE — 93246 EXT ECG>7D<15D RECORDING: CPT

## 2024-01-28 PROCEDURE — G0378 HOSPITAL OBSERVATION PER HR: HCPCS

## 2024-01-28 PROCEDURE — 25010000002 ENOXAPARIN PER 10 MG: Performed by: INTERNAL MEDICINE

## 2024-01-28 RX ORDER — NADOLOL 20 MG/1
20 TABLET ORAL
Status: DISCONTINUED | OUTPATIENT
Start: 2024-01-28 | End: 2024-01-28 | Stop reason: HOSPADM

## 2024-01-28 RX ORDER — BUDESONIDE AND FORMOTEROL FUMARATE DIHYDRATE 80; 4.5 UG/1; UG/1
2 AEROSOL RESPIRATORY (INHALATION)
Status: DISCONTINUED | OUTPATIENT
Start: 2024-01-28 | End: 2024-01-28 | Stop reason: HOSPADM

## 2024-01-28 RX ORDER — ENOXAPARIN SODIUM 100 MG/ML
40 INJECTION SUBCUTANEOUS EVERY 12 HOURS SCHEDULED
Status: DISCONTINUED | OUTPATIENT
Start: 2024-01-28 | End: 2024-01-28 | Stop reason: HOSPADM

## 2024-01-28 RX ORDER — PANTOPRAZOLE SODIUM 40 MG/1
40 TABLET, DELAYED RELEASE ORAL
Status: DISCONTINUED | OUTPATIENT
Start: 2024-01-28 | End: 2024-01-28 | Stop reason: HOSPADM

## 2024-01-28 RX ORDER — CETIRIZINE HYDROCHLORIDE 10 MG/1
10 TABLET ORAL DAILY
Status: DISCONTINUED | OUTPATIENT
Start: 2024-01-28 | End: 2024-01-28 | Stop reason: HOSPADM

## 2024-01-28 RX ORDER — LOSARTAN POTASSIUM 100 MG/1
100 TABLET ORAL DAILY
Qty: 30 TABLET | Refills: 3 | Status: SHIPPED | OUTPATIENT
Start: 2024-01-29

## 2024-01-28 RX ORDER — LOSARTAN POTASSIUM 100 MG/1
100 TABLET ORAL DAILY
Status: DISCONTINUED | OUTPATIENT
Start: 2024-01-29 | End: 2024-01-28 | Stop reason: HOSPADM

## 2024-01-28 RX ORDER — LOSARTAN POTASSIUM 50 MG/1
50 TABLET ORAL DAILY
Status: DISCONTINUED | OUTPATIENT
Start: 2024-01-28 | End: 2024-01-28

## 2024-01-28 RX ADMIN — LOSARTAN POTASSIUM 50 MG: 50 TABLET, FILM COATED ORAL at 08:08

## 2024-01-28 RX ADMIN — PANTOPRAZOLE SODIUM 40 MG: 40 TABLET, DELAYED RELEASE ORAL at 08:08

## 2024-01-28 RX ADMIN — ENOXAPARIN SODIUM 40 MG: 100 INJECTION SUBCUTANEOUS at 08:09

## 2024-01-28 NOTE — PROGRESS NOTES
[facility]  Progress note: 2024    Patient Identification:  Name:Fazal Tavarez  Age:38 y.o.  Sex: male  :  1985  MRN: [id number]           CC:  Chest pain, palpitations    Interval history:  Vitals demonstrate hypertension.  Echo with hyperdynamic left ventricle, ejection fraction send greater than 70%, mild left ventricular hypertrophy, normal diastolic function, mild pulmonary hypertension with RVSP 30 mmHg.  He has been ambulating around his room without chest pain.  In fact no further chest pain since admission or fluttering.    Vital Signs:   Temp:  [97.5 °F (36.4 °C)-98.6 °F (37 °C)] 98.5 °F (36.9 °C)  Heart Rate:  [71-84] 71  Resp:  [16] 16  BP: (140-165)/(71-98) 150/85    Intake/Output Summary (Last 24 hours) at 2024 0737  Last data filed at 2024 0700  Gross per 24 hour   Intake 240 ml   Output --   Net 240 ml       Physical Examination:    General Appearance No acute distress   Neck No adenopathy, supple, trachea midline, no thyromegaly, no carotid bruit, no JVD   Lungs Clear to auscultation,respirations regular, even and unlabored   Heart Regular rhythm and normal rate, normal S1 and S2, no murmur, no gallop, no rub, no click   Chest wall No abnormalities observed   Abdomen Normal bowel sounds, no masses, no hepatomegaly, soft   Extremities Moves all extremities well, no edema, no cyanosis, no redness   Neurological Alert and oriented x 3     Lab Review:  Personally reviewed the labs, radiology imaging and other cardiac procedures.   Results from last 7 days   Lab Units 24  1933   SODIUM mmol/L 137   POTASSIUM mmol/L 3.7   CHLORIDE mmol/L 102   CO2 mmol/L 22.3   BUN mg/dL 12   CREATININE mg/dL 0.82   CALCIUM mg/dL 9.1   BILIRUBIN mg/dL 0.2   ALK PHOS U/L 93   ALT (SGPT) U/L 48*   AST (SGOT) U/L 36   GLUCOSE mg/dL 85     Results from last 7 days   Lab Units 24  0311 24  2235 24  1933   HSTROP T ng/L 10 18 13     Results from last 7 days   Lab  Units 01/26/24  1933   WBC 10*3/mm3 9.31   HEMOGLOBIN g/dL 13.0   HEMATOCRIT % 40.2   PLATELETS 10*3/mm3 201         Medication Review:   Meds reviewed  Scheduled Meds:budesonide-formoterol, 2 puff, Inhalation, BID - RT  cetirizine, 10 mg, Oral, Daily  enoxaparin, 40 mg, Subcutaneous, Q12H  losartan, 50 mg, Oral, Daily  nadolol, 20 mg, Oral, Q24H  pantoprazole, 40 mg, Oral, Q AM      I personally viewed and interpreted the patient's EKG/Telemetry data    Assessment and Plan  1.  Chest pain.  D-dimer negative echo with mild pulmonary hypertension hyperdynamic left ventricle and mild left ventricular hypertrophy.  Stress test negative for ischemia.  Okay to dismiss home with further titration of antihypertensives and Ziopatch in place.  He I also asked him to contact MELODIE Potter (his primary care provider) regarding further GI evaluation.  He will also check his blood pressure with these events  2.  Palpitations.  This seems to have triggered the chest pain event.  Will place a 2-week Zio patch.  Long-term he will also consider Apple Watch on their smart phone monitoring techniques  3.  Hyperlipidemia  4.  Hypertension with hypertensive heart disease.  Will increase losartan to 100 mg a day and he will continue with nadolol.  He will call with additional readings.  Follow-up in 1 month.  4.  Obesity  5.  History of elevated liver function test with history of fatty infiltration of the liver.  Reminded him that untreated sleep apnea can also cause hepatic dysfunction.  6.  Asthma  7.  Reflux disease  8.  Untreated MARC.  His sister will help him to try to reinstitute CPAP therapy.  He will also call sleep medicine regarding this.      Mini Figueroa  1/28/202407:37 EST  25min spent in reviewing records, discussion and examination of the patient and discussion with other members of the patient's medical team.     Dictated utilizing Dragon dictation

## 2024-01-28 NOTE — OUTREACH NOTE
Prep Survey      Flowsheet Row Responses   [facility] patient discharged from? [place]   Is LACE score < 7 ? Yes   Eligibility [facility]   Date of Admission 01/26/24   Date of Discharge 01/28/24   Discharge Disposition Home or Self Care   Discharge diagnosis Chest pain   Does the patient have one of the following disease processes/diagnoses(primary or secondary)? Other   Does the patient have Home health ordered? No   Is there a DME ordered? No   Prep survey completed? Yes            YUNG JAMES - Registered Nurse

## 2024-01-28 NOTE — PLAN OF CARE
Pt is AO x 4. SR on tele and RA. No complaints of chest pain. Sleep well overnight. Overnight oximetry done. WCTM     Problem: Chest Pain  Goal: Resolution of Chest Pain Symptoms  Outcome: Ongoing, Progressing     Problem: Chest Pain  Goal: Resolution of Chest Pain Symptoms  Outcome: Ongoing, Progressing     Problem: Asthma Comorbidity  Goal: Maintenance of Asthma Control  Outcome: Ongoing, Progressing  Intervention: Maintain Asthma Symptom Control  Description: Evaluate adherence to self-management (asthma action plan), such as medication, symptom-control, trigger-avoidance and self-monitoring.  Advocate for continuation of home regimen, including medication, method of delivery, schedule and symptom monitoring; acknowledge preferred modality and routine.  Minimize exposure to potential triggers, such as perfume, cleaning chemicals and all types of smoke.  Assess for proper use of inhaled medication and delivery technique; assist or reinstruct if needed.  Evaluate effectiveness of coping skills; encourage expression of feelings, expectations and concerns related to disease management and quality of life; reinforce education to enhance management plan and wellbeing.  Recent Flowsheet Documentation  Taken 1/28/2024 0441 by Quinn Nash RN  Medication Review/Management: medications reviewed  Taken 1/28/2024 0020 by Quinn Nash RN  Medication Review/Management: medications reviewed  Taken 1/27/2024 2248 by Quinn Nash RN  Medication Review/Management: medications reviewed  Taken 1/27/2024 2025 by Quinn Nash RN  Medication Review/Management: medications reviewed     Problem: Behavioral Health Comorbidity  Goal: Maintenance of Behavioral Health Symptom Control  Outcome: Ongoing, Progressing  Intervention: Maintain Behavioral Health Symptom Control  Description: Confirm mental health diagnosis and current treatment.  Evaluate adherence to previously identified self-management plan.  Advocate continuation of home  strategies, including medication.  Evaluate effectiveness of self-management strategies and coping skills.  Communicate with providers to ensure continuity and follow-up at transition.

## 2024-01-28 NOTE — DISCHARGE SUMMARY
Date of Discharge:  1/28/2024  Date of Admit: 1/26/2024    Discharge Diagnosis:  1.  Chest pain  2.  Palpitations  3.  Hyperlipidemia  4.  Hypertension with hypertensive heart disease  4.  Obesity  5.  Elevated liver function tests  6.  Asthma  7.  Reflux disease  8.  Untreated MARC with mild pulmonary hypertension     Hospital Course:   Patient was admitted to the ER with fluttering and chest pain.  Troponins were normal.  EKG unremarkable.  No arrhythmia noted on monitor.  D-dimer was normal.  Liver function tests are slightly abnormal but this was felt to be chronic and likely due to prior history of steatosis.  He had no abdominal symptoms.  Echocardiogram showed an ejection fraction of 72% with mild left and hypertrophy, normal diastolic function and mild pulm hypertension with RV systolic pressure 37 mmHg.  Stress perfusion study was negative for ischemia.  He remained hypertensive in hospital and had noted this at home.  Therefore losartan was increased to 100 mg a day on the day of dismissal.  He is also being sent home with a Ziopatch to assess for arrhythmia.  He has been instructed to contact Dr. Potter to consider further GI evaluation for chest pain and follow-up of elevated liver function test.  He will monitor his blood pressure closely and call office next week if remains elevated.  At the time of this dictation he is stable.      Discharge Medications     Discharge Medications        Changes to Medications        Instructions Start Date   losartan 100 MG tablet  Commonly known as: COZAAR  What changed:   medication strength  how much to take   100 mg, Oral, Daily   Start Date: January 29, 2024            Continue These Medications        Instructions Start Date   albuterol sulfate  (90 Base) MCG/ACT inhaler  Commonly known as: PROVENTIL HFA;VENTOLIN HFA;PROAIR HFA   2 puffs, Inhalation, Every 4 Hours PRN      azelastine 0.1 % nasal spray  Commonly known as: ASTELIN   INSTILL 2 SPRAYS IN EACH  NOSTRIL TWICE DAILY      cetirizine 10 MG tablet  Commonly known as: zyrTEC   10 mg, Oral, Daily      Flonase Sensimist 27.5 MCG/SPRAY nasal spray  Generic drug: fluticasone   1 spray, 2 Times Daily      ketotifen 0.025 % ophthalmic solution  Commonly known as: ZADITOR   1 drop, Both Eyes, 2 Times Daily      nadolol 20 MG tablet  Commonly known as: CORGARD   20 mg      omeprazole 20 MG capsule  Commonly known as: priLOSEC   20 mg, Oral, Daily      Symbicort 80-4.5 MCG/ACT inhaler  Generic drug: budesonide-formoterol   USE 2 INHALATIONS BY MOUTH TWICE DAILY               Discharge Diet: Low-salt American Heart Association diet    Activity at Discharge: Ad betty.    Discharge disposition: home    Condition on Discharge: stable    Follow-up Appointments  Follow-up in my office with MELODIE in 4 weeks  Patient to contact MELODIE Potter for follow-up in the next 1-2 weeks  Patient to contact sleep medicine regarding resumption of CPAP therapy     Amber Figueroa MD  01/28/24  12:43 EST    25min spent in reviewing records, discussion and examination of the patient and discussion with other members of the patient's medical team.     Dictated utilizing Dragon dictation

## 2024-01-29 ENCOUNTER — TRANSITIONAL CARE MANAGEMENT TELEPHONE ENCOUNTER (OUTPATIENT)
Dept: CALL CENTER | Facility: HOSPITAL | Age: 39
End: 2024-01-29
Payer: COMMERCIAL

## 2024-01-29 NOTE — OUTREACH NOTE
Call Center TCM Note      Flowsheet Row Responses   [facility] patient discharged from? [place]   Does the patient have one of the following disease processes/diagnoses(primary or secondary)? Other   TCM attempt successful? Yes   Call start time 1416   Call end time 1419   Discharge diagnosis Chest pain   Meds reviewed with patient/caregiver? Yes   Is the patient having any side effects they believe may be caused by any medication additions or changes? No   Does the patient have all medications ordered at discharge? Yes   Is the patient taking all medications as directed (includes completed medication regime)? Yes   Comments No available HOSP DC FU appts that mett TCM guidelines.   Does the patient have an appointment with their PCP within 7-14 days of discharge? No appointments available   Nursing Interventions Routed TCM call to PCP office   Has home health visited the patient within 72 hours of discharge? N/A   Psychosocial issues? No   Did the patient receive a copy of their discharge instructions? Yes   Nursing interventions Reviewed instructions with patient   What is the patient's perception of their health status since discharge? Improving   Is the patient/caregiver able to teach back signs and symptoms related to disease process for when to call PCP? Yes   Is the patient/caregiver able to teach back signs and symptoms related to disease process for when to call 911? Yes   Is the patient/caregiver able to teach back the hierarchy of who to call/visit for symptoms/problems? PCP, Specialist, Home health nurse, Urgent Care, ED, 911 Yes   TCM call completed? Yes   Wrap up additional comments Pt is doing ok. Pt reports he is logging BP. Pt is aware to inform cardio office if BP stays elevated with the increased Losartan. Does have an appt in 4 weeks with cardio for FU   Call end time 1419            Lynsey Bedoya RN    1/29/2024, 14:19 EST

## 2024-01-29 NOTE — CASE MANAGEMENT/SOCIAL WORK
Case Management Discharge Note      Final Note: home no needs         Selected Continued Care - Discharged on 1/28/2024 Admission date: 1/26/2024 - Discharge disposition: Home or Self Care      Destination    No services have been selected for the patient.                Durable Medical Equipment    No services have been selected for the patient.                Dialysis/Infusion    No services have been selected for the patient.                Home Medical Care    No services have been selected for the patient.                Therapy    No services have been selected for the patient.                Community Resources    No services have been selected for the patient.                Community & DME    No services have been selected for the patient.                    Transportation Services  Private: Car    Final Discharge Disposition Code: 01 - home or self-care

## 2024-02-02 ENCOUNTER — TELEPHONE (OUTPATIENT)
Dept: FAMILY MEDICINE CLINIC | Facility: CLINIC | Age: 39
End: 2024-02-02

## 2024-02-02 NOTE — TELEPHONE ENCOUNTER
"  Caller: Fazal Tavarez \"Kwame\"    Relationship to patient: Self    Best call back number: [phone]    New or established patient?  [] New  [x] Established    Date of discharge: 1/28/24    Facility discharged from: [facility]    Diagnosis/Symptoms: CHEST PAIN    Length of stay (If applicable): DAY    Specialty Only: Did you see a [facility] health provider?    [x] Yes  [] No  If so, who?     HIS CARDIOLOGIST TOLD HIM TO FOLLOW UP WITH HIS .    "

## 2024-02-09 ENCOUNTER — OFFICE VISIT (OUTPATIENT)
Dept: FAMILY MEDICINE CLINIC | Facility: CLINIC | Age: 39
End: 2024-02-09
Payer: COMMERCIAL

## 2024-02-09 VITALS
BODY MASS INDEX: 40.43 KG/M2 | OXYGEN SATURATION: 98 % | HEART RATE: 71 BPM | RESPIRATION RATE: 16 BRPM | DIASTOLIC BLOOD PRESSURE: 74 MMHG | SYSTOLIC BLOOD PRESSURE: 140 MMHG | HEIGHT: 74 IN | WEIGHT: 315 LBS

## 2024-02-09 DIAGNOSIS — R07.9 CHEST PAIN, UNSPECIFIED TYPE: ICD-10-CM

## 2024-02-09 DIAGNOSIS — J45.40 MODERATE PERSISTENT ASTHMA WITHOUT COMPLICATION: ICD-10-CM

## 2024-02-09 DIAGNOSIS — E78.2 MIXED HYPERLIPIDEMIA: ICD-10-CM

## 2024-02-09 DIAGNOSIS — Z09 HOSPITAL DISCHARGE FOLLOW-UP: Primary | ICD-10-CM

## 2024-02-09 DIAGNOSIS — R14.0 ABDOMINAL BLOATING: ICD-10-CM

## 2024-02-09 DIAGNOSIS — I10 ESSENTIAL HYPERTENSION: ICD-10-CM

## 2024-02-09 DIAGNOSIS — K21.9 GASTROESOPHAGEAL REFLUX DISEASE WITHOUT ESOPHAGITIS: ICD-10-CM

## 2024-02-09 DIAGNOSIS — E66.01 CLASS 3 SEVERE OBESITY DUE TO EXCESS CALORIES WITH SERIOUS COMORBIDITY AND BODY MASS INDEX (BMI) OF 45.0 TO 49.9 IN ADULT: ICD-10-CM

## 2024-02-09 DIAGNOSIS — G47.33 OSA (OBSTRUCTIVE SLEEP APNEA): ICD-10-CM

## 2024-02-09 DIAGNOSIS — R00.2 PALPITATIONS: ICD-10-CM

## 2024-02-09 RX ORDER — LOSARTAN POTASSIUM 100 MG/1
100 TABLET ORAL DAILY
Qty: 90 TABLET | Refills: 2 | Status: SHIPPED | OUTPATIENT
Start: 2024-02-09 | End: 2024-11-05

## 2024-02-09 RX ORDER — OMEPRAZOLE 20 MG/1
20 CAPSULE, DELAYED RELEASE ORAL 2 TIMES DAILY
Qty: 360 CAPSULE | Refills: 0 | Status: SHIPPED | OUTPATIENT
Start: 2024-02-09 | End: 2024-08-07

## 2024-02-09 RX ORDER — SILVER SULFADIAZINE 1 %
CREAM (GRAM) TOPICAL
COMMUNITY
Start: 2024-01-30

## 2024-02-09 NOTE — PROGRESS NOTES
Transitional Care Follow Up Visit  Subjective     Fazal Tavarez is a 38 y.o. male who presents for a transitional care management visit.    Within 48 business hours after discharge our office contacted him via telephone to coordinate his care and needs.      I reviewed and discussed the details of that call along with the discharge summary, hospital problems, inpatient lab results, inpatient diagnostic studies, and consultation reports with Fazal.     Current outpatient and discharge medications have been reconciled for the patient.  Reviewed by: MELODIE Joiner          1/28/2024     2:48 PM   Date of TCM Phone Call   [facility]   Date of Admission 1/26/2024   Date of Discharge 1/28/2024   Discharge Disposition Home or Self Care     Risk for Readmission (LACE) Score: 2 (1/28/2024  6:00 AM)      History of Present Illness  38-year-old male presents today for hospital discharge follow-up.  After being seen at [facility] from 1/26/2024 to 1/28/2024    He presented complaining of chest pains, palpitation and elevated blood pressure    Overall since discharge he has been feeling much better  They did increase his blood pressure medication to 100 mg p.o. daily losartan  He has been taking his medication as directed  Denies any headaches no blurred vision no dizziness no flushing no chest pain or pressure other than some burning/reflux pain in his epigastric/.  He states that he feels like food is sitting there and not getting digested.  He has seen GI in the past and has had upper and lower scopes.  He would like referral back to that specialty  He is currently taking omeprazole 20 mg p.o. daily  He would like to try an increase in medication  He is trying to avoid triggers    Denies any shortness of breath has not had to use his albuterol inhaler    He has monitor his blood pressure at home and has been running 110s to 130s over 70 to 80s.  No swelling    He does have a  cardiac monitor Holter on today in office.  He does have follow-up scheduled with cardiology on 2/26/2024      He has no other acute complaints at today      The following portions of the patient's history were reviewed and updated as appropriate: allergies, current medications, past family history, past medical history, past social history, past surgical history, and problem list         Course During Hospital Stay: Psychiatric  Date of Discharge:  1/28/2024  Date of Admit: 1/26/2024     Discharge Diagnosis:  1.  Chest pain  2.  Palpitations  3.  Hyperlipidemia  4.  Hypertension with hypertensive heart disease  4.  Obesity  5.  Elevated liver function tests  6.  Asthma  7.  Reflux disease  8.  Untreated MARC with mild pulmonary hypertension     Hospital Course:   Patient was admitted to the ER with fluttering and chest pain.  Troponins were normal.  EKG unremarkable.  No arrhythmia noted on monitor.  D-dimer was normal.  Liver function tests are slightly abnormal but this was felt to be chronic and likely due to prior history of steatosis.  He had no abdominal symptoms.  Echocardiogram showed an ejection fraction of 72% with mild left and hypertrophy, normal diastolic function and mild pulm hypertension with RV systolic pressure 37 mmHg.  Stress perfusion study was negative for ischemia.  He remained hypertensive in hospital and had noted this at home.  Therefore losartan was increased to 100 mg a day on the day of dismissal.  He is also being sent home with a Ziopatch to assess for arrhythmia.  He has been instructed to contact Dr. Potter to consider further GI evaluation for chest pain and follow-up of elevated liver function test.  He will monitor his blood pressure closely and call office next week if remains elevated.  At the time of this dictation he is stable.     The following portions of the patient's history were reviewed and updated as appropriate: allergies, current medications, past family  history, past medical history, past social history, past surgical history, and problem list.    Review of Systems   Constitutional:  Negative for chills, fatigue and fever.   Eyes:  Negative for photophobia and visual disturbance.   Respiratory:  Negative for cough, shortness of breath and wheezing.    Cardiovascular:  Negative for chest pain, palpitations and leg swelling.   Gastrointestinal:  Negative for abdominal distention, abdominal pain, anal bleeding, blood in stool, constipation, diarrhea, nausea, rectal pain and vomiting.   Neurological:  Negative for dizziness, light-headedness and headaches.   Psychiatric/Behavioral:  Negative for self-injury and suicidal ideas.        Objective   Physical Exam  Vitals reviewed.   Constitutional:       General: He is not in acute distress.  Eyes:      Conjunctiva/sclera: Conjunctivae normal.   Neck:      Thyroid: No thyromegaly.      Vascular: No carotid bruit.   Cardiovascular:      Rate and Rhythm: Normal rate and regular rhythm.      Heart sounds: Normal heart sounds. No murmur heard.     No gallop.   Pulmonary:      Effort: Pulmonary effort is normal. No respiratory distress.      Breath sounds: Normal breath sounds. No stridor. No wheezing, rhonchi or rales.   Chest:      Chest wall: No tenderness.   Abdominal:      General: Abdomen is flat. Bowel sounds are normal. There is no distension.      Palpations: Abdomen is soft. There is no mass.      Tenderness: There is no abdominal tenderness. There is no right CVA tenderness, left CVA tenderness, guarding or rebound.      Hernia: No hernia is present.   Lymphadenopathy:      Cervical: No cervical adenopathy.   Neurological:      Mental Status: He is alert and oriented to person, place, and time.   Psychiatric:         Attention and Perception: Attention normal.         Mood and Affect: Mood normal.     D-dimer, Quantitative (01/27/2024 11:52) normal  High Sensitivity Troponin T (01/27/2024 03:11) normal  High  Sensitivity Troponin T 2Hr (01/26/2024 22:35) normal troponin troponin T delta 5  High Sensitivity Troponin T (01/26/2024 19:33) normal  Comprehensive Metabolic Panel (01/26/2024 19:33) glucose 85 ALT 48   CBC & Differential (01/26/2024 19:33) H&H normal 13.0 and 40.2 normal white blood cell count    Stress Test With Myocardial Perfusion One Day (01/27/2024 14:21)      Myocardial perfusion imaging indicates a normal myocardial perfusion study with no evidence of ischemia. Impressions are consistent with a low risk study.    Left ventricular ejection fraction is normal (Calculated EF = 58%).  XR Chest 1 View (01/26/2024 19:57)    IMPRESSION:  No evidence for acute pulmonary process. Follow-up as  clinical indications persist.    Assessment & Plan   Problems Addressed this Visit       Essential hypertension    Relevant Medications    losartan (COZAAR) 100 MG tablet    Gastroesophageal reflux disease without esophagitis    Relevant Medications    omeprazole (priLOSEC) 20 MG capsule    Other Relevant Orders    Ambulatory Referral to Gastroenterology    Chest pain     Other Visit Diagnoses       Hospital discharge follow-up    -  [facility] 1/26/2024 to 1/28/2024  Chest pain    Doing well symptoms have resolved other than GERD    Palpitations        Wearing Holter monitor follow-up with cardiology on 2/26/2024    Mixed hyperlipidemia        Continue work on lower cholesterol diet and exercise as tolerated.  Repeat lipid panel with next labs    Abdominal bloating        Increase omeprazole to twice daily referral back to GI    Relevant Medications    omeprazole (priLOSEC) 20 MG capsule    Other Relevant Orders    Ambulatory Referral to Gastroenterology    Class 3 severe obesity due to excess calories with serious comorbidity and body mass index (BMI) of 45.0 to 49.9 in adult        Continue to work on diet and exercise    Moderate persistent asthma without complication        Report any  increased albuterol use    MARC (obstructive sleep apnea)        Continue CPAP          Diagnoses         Codes Comments    Hospital discharge follow-up    -  Primary ICD-10-CM: Z09  ICD-9-CM: V67.59 [facility] 1/26/2024 to 1/28/2024  Chest pain    Doing well symptoms have resolved other than GERD    Palpitations     ICD-10-CM: R00.2  ICD-9-CM: 785.1 Wearing Holter monitor follow-up with cardiology on 2/26/2024    Chest pain, unspecified type     ICD-10-CM: R07.9  ICD-9-CM: 786.50 No acute chest pain in office today    Essential hypertension     ICD-10-CM: I10  ICD-9-CM: 401.9 Losartan was increased to 100 mg p.o. daily in hospital  Continue to monitor blood pressure at home bring log to next visit    Mixed hyperlipidemia     ICD-10-CM: E78.2  ICD-9-CM: 272.2 Continue work on lower cholesterol diet and exercise as tolerated.  Repeat lipid panel with next labs    Gastroesophageal reflux disease without esophagitis     ICD-10-CM: K21.9  ICD-9-CM: 530.81 Avoid triggers including caffeine, nicotine, alcohol, spicy foods, red sauce  Referral back to GI  Increase omeprazole to twice daily    Abdominal bloating     ICD-10-CM: R14.0  ICD-9-CM: 787.3 Increase omeprazole to twice daily referral back to GI    Class 3 severe obesity due to excess calories with serious comorbidity and body mass index (BMI) of 45.0 to 49.9 in adult     ICD-10-CM: E66.01, Z68.42  ICD-9-CM: 278.01, V85.42 Continue to work on diet and exercise    Moderate persistent asthma without complication     ICD-10-CM: J45.40  ICD-9-CM: 493.90 Report any increased albuterol use    MARC (obstructive sleep apnea)     ICD-10-CM: G47.33  ICD-9-CM: 327.23 Continue CPAP            Keep follow-up scheduled with cardiology on 2/26/2024 follow-up with PCP as directed  In office in 3 months for annual    To ER with any shortness of breath or chest pain

## 2024-02-21 ENCOUNTER — TELEPHONE (OUTPATIENT)
Dept: CARDIOLOGY | Facility: CLINIC | Age: 39
End: 2024-02-21
Payer: COMMERCIAL

## 2024-02-21 NOTE — TELEPHONE ENCOUNTER
Please let him know that monitor study was normal.  No evidence of any abnormal heart rhythms.  Would continue current medications and keep currently scheduled follow-up appointment.

## 2024-02-22 NOTE — TELEPHONE ENCOUNTER
I called Fazal Tavarez but there was no answer and no option to leave a voicemail.  Will continue to try to reach patient.      HUB- if pt calls back, please transfer through to triage.    Thank you,    Emy BORREGO RN  [place] [facility] [place]  02/22/24 08:26 EST

## 2024-02-23 NOTE — TELEPHONE ENCOUNTER
Reviewed results and recommendations with Fazal Tavarez.  Patient verbalized understanding of results and recommendations.    Thank you,  Helena ESCOBAR RN  Triage Nurse G   10:11 EST

## 2024-02-26 ENCOUNTER — OFFICE VISIT (OUTPATIENT)
Dept: CARDIOLOGY | Facility: CLINIC | Age: 39
End: 2024-02-26
Payer: COMMERCIAL

## 2024-02-26 VITALS
BODY MASS INDEX: 40.43 KG/M2 | WEIGHT: 315 LBS | SYSTOLIC BLOOD PRESSURE: 126 MMHG | HEIGHT: 74 IN | HEART RATE: 53 BPM | DIASTOLIC BLOOD PRESSURE: 84 MMHG

## 2024-02-26 DIAGNOSIS — R00.2 PALPITATIONS: ICD-10-CM

## 2024-02-26 DIAGNOSIS — E66.01 MORBID (SEVERE) OBESITY DUE TO EXCESS CALORIES: ICD-10-CM

## 2024-02-26 DIAGNOSIS — G47.33 OBSTRUCTIVE SLEEP APNEA: ICD-10-CM

## 2024-02-26 DIAGNOSIS — K21.9 GASTROESOPHAGEAL REFLUX DISEASE WITHOUT ESOPHAGITIS: ICD-10-CM

## 2024-02-26 DIAGNOSIS — I10 ESSENTIAL HYPERTENSION: ICD-10-CM

## 2024-02-26 DIAGNOSIS — J45.909 ASTHMA, UNSPECIFIED ASTHMA SEVERITY, UNSPECIFIED WHETHER COMPLICATED, UNSPECIFIED WHETHER PERSISTENT: ICD-10-CM

## 2024-02-26 DIAGNOSIS — R07.2 PRECORDIAL PAIN: Primary | ICD-10-CM

## 2024-02-26 PROCEDURE — 99214 OFFICE O/P EST MOD 30 MIN: CPT | Performed by: NURSE PRACTITIONER

## 2024-02-26 PROCEDURE — 93000 ELECTROCARDIOGRAM COMPLETE: CPT | Performed by: NURSE PRACTITIONER

## 2024-02-26 NOTE — PROGRESS NOTES
Date of Office Visit: 2024  Encounter Provider: MELODIE Mixon  Place of Service: [facility] CARDIOLOGY  Patient Name: Fazal Tavarez  :1985    Chief complaint  Hospital follow up    History of Present Illness  Patient is a 38 y.o. year old male  patient of Dr. Figueroa. Past medical history includes hypertension, hyperlipidemia, GERD, MARC, obesity and lumbar degenerative disc disease.      He presented to the ER on 2024 with complaints of intermittent chest pain that started earlier that afternoon. He describes the pain as a sharp sensation in the anterior of his chest. He reported associated shortness of breath and mild lightheadedness. Also notes that this started after tachycardia at rates 112 bpm on his watch then with chest pain while eating lunch. He rates it as 3-4/10 and pleuritic. Rapid HR resolved after 10 min. Chest pain then occurred intermittent since then and completely resolved after 12 hours. There are no aggravating or alleviating factors. Denied recent travel, sick contacts, chills, cough, NVD. While in the ER he was asymptomatic. Vitals: /90, HR 82. Labs unremarkable with the exception of ALT of 48. HS troponin 13--> 18. EKG ST, rate 73, borderline nonspecific repolarization abnormalities. Echo with hyperdynamic left ventricle, ejection fraction send greater than 70%, mild left ventricular hypertrophy, normal diastolic function, mild pulmonary hypertension with RVSP 30 mmHg.  Stress perfusion study was negative for ischemia. He remained hypertensive in hospital and had noted this at home, therefore losartan was increased to 100 mg a day on the day of dismissal. 2 week zio patch was normal with no abnormal rhythm identified.    Interval history  Patient presents today for hospital follow-up.  I will visit with him for the first time today and have reviewed his medical record.  Since discharge he has been doing well.  He is wearing CPAP  consistently.  He denies palpitations, shortness of breath, edema, dizziness, chest pain or chest pressure, fatigue, syncope or presyncope.  Blood pressure today is well-controlled and he reports similar readings at home.  He is not exercising but plans to start a walking regimen as weather improves.  He reports that he is active at home and has no exertional symptoms.    Past Medical History:   Diagnosis Date    Anemia     COVID-19 12/01/2021    External hemorrhoid     GERD (gastroesophageal reflux disease)     Hyperlipidemia     Hypertension     Perirectal abscess     Rectal bleeding     Seasonal allergies      Past Surgical History:   Procedure Laterality Date    COLONOSCOPY W/ POLYPECTOMY N/A 3/15/2017    Procedure: COLONOSCOPY TO CECUM AND TERMINAL ILEUM;  Surgeon: Pratik Kaur MD;  Location: [facility] ENDOSCOPY;  Service:     ENDOSCOPY N/A 3/15/2017    Procedure: ESOPHAGOGASTRODUODENOSCOPY WITH BIOPSIES;  Surgeon: Pratik Kaur MD;  Location: [facility] ENDOSCOPY;  Service:     INCISION AND DRAINAGE PERIRECTAL ABSCESS N/A 4/4/2018    Procedure: INCISION AND DRAINAGE OF PERIRECTAL ABSCESS;  Surgeon: Denise White MD;  Location: [facility] MAIN OR;  Service: General    WISDOM TOOTH EXTRACTION       Outpatient Medications Prior to Visit   Medication Sig Dispense Refill    albuterol sulfate  (90 Base) MCG/ACT inhaler Inhale 2 puffs Every 4 (Four) Hours As Needed for Wheezing.      azelastine (ASTELIN) 0.1 % nasal spray INSTILL 2 SPRAYS IN EACH NOSTRIL TWICE DAILY      cetirizine (zyrTEC) 10 MG tablet Take 1 tablet by mouth Daily.      Flonase Sensimist 27.5 MCG/SPRAY nasal spray 1 spray 2 (Two) Times a Day.      ketotifen (ZADITOR) 0.025 % ophthalmic solution Administer 1 drop to both eyes 2 (Two) Times a Day.      losartan (COZAAR) 100 MG tablet Take 1 tablet by mouth Daily for 270 days. 90 tablet 2    nadolol (CORGARD) 20 MG tablet 1 tablet.      omeprazole (priLOSEC) 20 MG capsule Take 1 capsule  "by mouth 2 (Two) Times a Day for 180 days. 360 capsule 0    SSD 1 % cream apply to the affected area(s) twice daily      Symbicort 80-4.5 MCG/ACT inhaler USE 2 INHALATIONS BY MOUTH TWICE DAILY       No facility-administered medications prior to visit.       Allergies as of 02/26/2024 - Reviewed 02/26/2024   Allergen Reaction Noted    Lisinopril Cough 09/02/2021     Social History     Socioeconomic History    Marital status: Single   Tobacco Use    Smoking status: Former     Packs/day: 1.50     Years: 10.00     Additional pack years: 0.00     Total pack years: 15.00     Types: Cigarettes, Electronic Cigarette    Smokeless tobacco: Never    Tobacco comments:     quit e cigarettes in 2019.    Vaping Use    Vaping Use: Former    Substances: Nicotine    Devices: Disposable   Substance and Sexual Activity    Alcohol use: Yes     Alcohol/week: 2.0 standard drinks of alcohol     Types: 2 Cans of beer per week    Drug use: Yes     Types: Marijuana    Sexual activity: Defer     Family History   Problem Relation Age of Onset    Diabetes Father     Diabetes Paternal Uncle      Review of Systems   Constitutional: Negative for malaise/fatigue.   Cardiovascular:  Negative for chest pain, claudication, dyspnea on exertion, leg swelling, near-syncope, orthopnea, palpitations, paroxysmal nocturnal dyspnea and syncope.   Respiratory:  Negative for shortness of breath.    Neurological:  Negative for brief paralysis, dizziness, headaches and light-headedness.   All other systems reviewed and are negative.       Objective:     Vitals:    02/26/24 1126   BP: 126/84   BP Location: Left arm   Patient Position: Sitting   Cuff Size: Small Adult   Pulse: 53   Weight: (!) 162 kg (358 lb)   Height: 188 cm (74\")     Body mass index is 45.96 kg/m².    Vitals reviewed.   Constitutional:       General: Not in acute distress.     Appearance: Well-developed and not in distress. Not diaphoretic.   HENT:      Head: Normocephalic.   Pulmonary:      " "Effort: Pulmonary effort is normal. No respiratory distress.      Breath sounds: Normal breath sounds. No wheezing. No rhonchi. No rales.   Cardiovascular:      Normal rate. Regular rhythm.      Murmurs: There is no murmur.   Pulses:     Radial: 2+ bilaterally.  Edema:     Peripheral edema absent.   Skin:     General: Skin is warm and dry. There is no cyanosis.      Findings: No rash.   Neurological:      Mental Status: Alert and oriented to person, place, and time.   Psychiatric:         Behavior: Behavior normal. Behavior is cooperative.         Thought Content: Thought content normal.         Judgment: Judgment normal.       Lab Review:     Lab Results   Component Value Date     01/26/2024     06/21/2023    K 3.7 01/26/2024    K 4.9 06/21/2023     01/26/2024     06/21/2023    CO2 22.3 01/26/2024    CO2 29.1 (H) 06/21/2023    BUN 12 01/26/2024    BUN 11 06/21/2023    CREATININE 0.82 01/26/2024    CREATININE 0.91 06/21/2023    EGFRIFNONA 108 09/13/2021    EGFRIFNONA 105 06/03/2021    EGFRIFAFRI 131 09/13/2021    EGFRIFAFRI 127 06/03/2021    GLUCOSE 85 01/26/2024    GLUCOSE 88 06/21/2023    CALCIUM 9.1 01/26/2024    CALCIUM 9.9 06/21/2023    PROTENTOTREF 6.7 06/21/2023    PROTENTOTREF 7.1 09/13/2021    ALBUMIN 4.5 01/26/2024    ALBUMIN 4.5 06/21/2023    BILITOT 0.2 01/26/2024    BILITOT 0.3 06/21/2023    AST 36 01/26/2024    AST 33 06/21/2023    ALT 48 (H) 01/26/2024    ALT 39 06/21/2023     Lab Results   Component Value Date    WBC 9.31 01/26/2024    WBC 8.87 06/21/2023    HGB 13.0 01/26/2024    HGB 13.8 06/21/2023    HCT 40.2 01/26/2024    HCT 40.5 06/21/2023    MCV 87.8 01/26/2024    MCV 86.4 06/21/2023     01/26/2024     06/21/2023     No results found for: \"PROBNP\", \"BNP\"  Lab Results   Component Value Date    TROPONINT 10 01/27/2024     Lab Results   Component Value Date    TSH 2.290 06/21/2023    TSH 1.710 11/11/2020      Lipid Panel          6/21/2023    08:23   Lipid " Panel   Total Cholesterol 215    Triglycerides 260    HDL Cholesterol 40    VLDL Cholesterol 46    LDL Cholesterol  129           ECG 12 Lead    Date/Time: 2/26/2024 11:38 AM  Performed by: Mady Munoz APRN    Authorized by: Mady Munoz APRN  Comparison: compared with previous ECG   Similar to previous ECG  Rhythm: sinus rhythm  Rate: normal  BPM: 53  ST Depression: II, III and aVF  QRS axis: normal  Comments: Similar to prior.  No new ischemic changes        Assessment:       Diagnosis Plan   1. Precordial pain        2. Essential hypertension        3. Palpitations        4. Morbid (severe) obesity due to excess calories        5. Gastroesophageal reflux disease without esophagitis        6. Asthma, unspecified asthma severity, unspecified whether complicated, unspecified whether persistent        7. Obstructive sleep apnea          Plan:       1.  Chest pain.  D-dimer negative, echo with mild pulmonary hypertension and hyperdynamic left ventricle and mild left ventricular hypertrophy.  Stress test negative for ischemia.  No residual chest pain.  Blood pressure much better on current regimen.  Following with PCP for GI evaluation. Will repeat echo at next appointment now that BP is better controlled and he is treating sleep apnea.  2.  Palpitations.  2 weeks Zio patch with no evidence of dysrhythmia.  I asked him to avoid stimulants such as caffeine, alcohol, chocolate or stimulant medications such as decongestants and nasal sprays.    3.  Hyperlipidemia  4.  Hypertension with hypertensive heart disease.  Controlled on current regimen of losartan 100 mg daily and nadolol 20 mg daily. I asked him to continue to check BP at home and call if consistently >130/80 or <110/50.   5.  Obesity  6.  History of elevated liver function test with history of fatty infiltration of the liver.  Reminded him that untreated sleep apnea can also cause hepatic dysfunction.  7.  Asthma  8.  Reflux disease. Managed by  PCP  9.  MARC.  Now compliant with CPAP and following with sleep medicine      Time Spent: I spent 30 minutes caring for Fazal on this date of service. This time includes time spent by me in the following activities: preparing for the visit, reviewing tests, performing a medically appropriate examination and/or evaluations, counseling and educating the patient/family/caregiver, ordering medications, tests, or procedures, documenting information in the medical record, and independently interpreting results and communicating that information with the patient/family/caregiver.   I spent 1 minutes on the separately reported service of ECG. This time is not included in the time used to support the E/M service also reported today.        Your medication list            Accurate as of February 26, 2024 11:42 AM. If you have any questions, ask your nurse or doctor.                CONTINUE taking these medications        Instructions Last Dose Given Next Dose Due   albuterol sulfate  (90 Base) MCG/ACT inhaler  Commonly known as: PROVENTIL HFA;VENTOLIN HFA;PROAIR HFA      Inhale 2 puffs Every 4 (Four) Hours As Needed for Wheezing.       azelastine 0.1 % nasal spray  Commonly known as: ASTELIN      INSTILL 2 SPRAYS IN EACH NOSTRIL TWICE DAILY       cetirizine 10 MG tablet  Commonly known as: zyrTEC      Take 1 tablet by mouth Daily.       Flonase Sensimist 27.5 MCG/SPRAY nasal spray  Generic drug: fluticasone      1 spray 2 (Two) Times a Day.       ketotifen 0.025 % ophthalmic solution  Commonly known as: ZADITOR      Administer 1 drop to both eyes 2 (Two) Times a Day.       losartan 100 MG tablet  Commonly known as: COZAAR      Take 1 tablet by mouth Daily for 270 days.       nadolol 20 MG tablet  Commonly known as: CORGARD      1 tablet.       omeprazole 20 MG capsule  Commonly known as: priLOSEC      Take 1 capsule by mouth 2 (Two) Times a Day for 180 days.       SSD 1 % cream  Generic drug: silver sulfadiazine       apply to the affected area(s) twice daily       Symbicort 80-4.5 MCG/ACT inhaler  Generic drug: budesonide-formoterol      USE 2 INHALATIONS BY MOUTH TWICE DAILY                Patient is no longer taking -.  I corrected the med list to reflect this.  I did not stop these medications.    No follow-ups on file.      Dictated utilizing Dragon dictation

## 2024-02-28 ENCOUNTER — OFFICE VISIT (OUTPATIENT)
Dept: SLEEP MEDICINE | Facility: HOSPITAL | Age: 39
End: 2024-02-28
Payer: COMMERCIAL

## 2024-02-28 VITALS — WEIGHT: 315 LBS | HEIGHT: 74 IN | BODY MASS INDEX: 40.43 KG/M2 | OXYGEN SATURATION: 97 % | HEART RATE: 70 BPM

## 2024-02-28 DIAGNOSIS — G47.33 SEVERE OBSTRUCTIVE SLEEP APNEA: Primary | ICD-10-CM

## 2024-02-28 DIAGNOSIS — R07.89 ATYPICAL CHEST PAIN: ICD-10-CM

## 2024-02-28 DIAGNOSIS — R06.83 SNORING: ICD-10-CM

## 2024-02-28 DIAGNOSIS — G47.10 HYPERSOMNIA: ICD-10-CM

## 2024-02-28 DIAGNOSIS — G47.8 NON-RESTORATIVE SLEEP: ICD-10-CM

## 2024-02-28 DIAGNOSIS — E66.01 MORBID OBESITY: ICD-10-CM

## 2024-02-28 DIAGNOSIS — I27.20 PULMONARY HYPERTENSION: ICD-10-CM

## 2024-02-28 DIAGNOSIS — G47.34 SLEEP RELATED HYPOXIA: ICD-10-CM

## 2024-02-28 NOTE — PROGRESS NOTES
"[facility] Note     Chief Complaint:  MARC     Primary Care Physician: Kyleigh Potter ([place]), MELODIE    Interval History:   The patient is a 38 y.o. male  who was last seen in the sleep lab: 8/19/2022 for home sleep study which showed AHI of 83.9 supine .6 lowest SpO2 70%.  Advised auto CPAP 8-20.  Did not tolerate Pap machine.  Presents today to reestablish care.  Bedtime 9:30 PM wake time 4 AM no tobacco use 5 alcoholic drinks per week no energy drink use 2 cups of coffee in the morning tea or soda for lunch.  Significant amount of sleep-related hypoxia noted on study; spent 264 minutes respiratory less than 89%.    January 26, 2024 admitted to the hospital with atypical chest pain and palpitations and mild pulmonary hypertension.    Review of Systems:    A complete review of systems was done and all were negative with the exception of acid reflux nasal congestion    Social History:    Social History     Socioeconomic History    Marital status: Single   Tobacco Use    Smoking status: Former     Packs/day: 1.50     Years: 10.00     Additional pack years: 0.00     Total pack years: 15.00     Types: Cigarettes, Electronic Cigarette    Smokeless tobacco: Never    Tobacco comments:     quit e cigarettes in 2019.    Vaping Use    Vaping Use: Former    Substances: Nicotine    Devices: Disposable   Substance and Sexual Activity    Alcohol use: Yes     Alcohol/week: 2.0 standard drinks of alcohol     Types: 2 Cans of beer per week    Drug use: Yes     Types: Marijuana    Sexual activity: Defer       Allergies:  Lisinopril     Medication Review:  Reviewed.      Vital Signs:    Vitals:    02/28/24 0903   Pulse: 70   SpO2: 97%   Weight: (!) 164 kg (361 lb 9.6 oz)   Height: 188 cm (74\")     Body mass index is 46.43 kg/m².    Physical Exam:    Constitutional:  Well developed 38 y.o. male that appears in no apparent distress.  Awake & oriented times 3.  Normal mood with normal recent and remote memory " and normal judgement.  Eyes:  Conjunctivae normal.  Oropharynx: Previously, moist mucous membranes.    Self-administered [place] Sleepiness Scale test results: 12  0-5 Lower normal daytime sleepiness  6-10 Higher normal daytime sleepiness  11-12 Mild, 13-15 Moderate, & 16-24 Severe excessive daytime sleepiness    Impression:   1. Severe obstructive sleep apnea    2. Sleep related hypoxia    3. Morbid obesity    4. Hypersomnia    5. Non-restorative sleep    6. Snoring    7. Pulmonary hypertension    8. Atypical chest pain        Discussed with sleep apnea can contribute to increased risk of heart attack stroke arrhythmias and dementia.  Patient expressed understanding.  Very amenable and motivated to get into care with PAP despite tolerance issues in the past.  Significant sleep-related hypoxia noted on HST; over 260 minutes at SpO2 less than 89%.    Plan:  Good sleep hygiene measures should be maintained.  Weight loss would be beneficial in this patient who morbid obesity by Body mass index is 46.43 kg/m²..      Follow-up in lab titration study.  Discussed considering sleep aid to help with the sleep efficiency; patient reported he would not need this.  While waiting for study to get done advised to sleep with head of the bed elevated and/or on side.  Patient expressed understanding.    Caution during activities that require prolonged concentration is strongly advised if sleepiness returns. Changing of PAP supplies regularly is important for effective use. Patient needs to change cushion on the mask or plugs on nasal pillows along with disposable filters once every month and change mask frame, tubing, headgear and Velcro straps every 6 months at the minimum.    I answered all of the patient's questions.  The patient will call for any problems.      Parish Hernandez MD  Sleep Medicine  02/28/24  09:20 EST

## 2024-03-07 ENCOUNTER — OFFICE VISIT (OUTPATIENT)
Dept: GASTROENTEROLOGY | Facility: CLINIC | Age: 39
End: 2024-03-07
Payer: COMMERCIAL

## 2024-03-07 VITALS
HEART RATE: 63 BPM | BODY MASS INDEX: 40.43 KG/M2 | DIASTOLIC BLOOD PRESSURE: 89 MMHG | WEIGHT: 315 LBS | OXYGEN SATURATION: 95 % | SYSTOLIC BLOOD PRESSURE: 142 MMHG | TEMPERATURE: 98.6 F | HEIGHT: 74 IN

## 2024-03-07 DIAGNOSIS — K76.0 FATTY LIVER: ICD-10-CM

## 2024-03-07 DIAGNOSIS — R74.01 ELEVATED TRANSAMINASE LEVEL: ICD-10-CM

## 2024-03-07 DIAGNOSIS — K21.9 GASTROESOPHAGEAL REFLUX DISEASE WITHOUT ESOPHAGITIS: Primary | ICD-10-CM

## 2024-03-07 LAB
FERRITIN SERPL-MCNC: 30.6 NG/ML (ref 30–400)
GGT SERPL-CCNC: 59 U/L (ref 8–61)
IRON SATN MFR SERPL: 18 % (ref 20–50)
IRON SERPL-MCNC: 74 MCG/DL (ref 59–158)
TIBC SERPL-MCNC: 416 MCG/DL
UIBC SERPL-MCNC: 342 MCG/DL (ref 112–346)

## 2024-03-07 NOTE — PROGRESS NOTES
"Chief Complaint  Heartburn    Subjective          History of Present Illness    Fazal Tavarez is a  38 y.o. male presents for referral from PCP office-MELODIE Joiner for GERD and abdominal bloating.  He is a patient of Dr. Kaur last seen in 2017.  He is new to me.    He was admitted to the hospital for chest pain underwent cardiac workup:  echo with mild pulmonary hypertension and hyperdynamic left ventricle and mild left ventricular hypertrophy.  Stress test negative for ischemia.   Blood pressure medication adjusted.  Working on getting CPAP for MARC.    He is on omeprazole 20 mg twice daily (increased to BID a few weeks done) for burning epigastric pain and chest fullness. Nocturnal awakenings. Felt like food was not digesting. Increasing to PPI BID helped about 90%. Last EGD 3/15/2017 for MARIANA was unremarkable.  Pathology with normal duodenal biopsies, mild chronic gastritis, negative H. pylori.  1/26/24 CBC unremarkable. Takes NSAIDS couple times/week.     History of elevated liver function test with history of fatty infiltration of the liver.  1/26/24 CMP with ALT 48, AST 36.  Chronically mildly elevated transaminases.  Normal alkaline phosphatase and bilirubin.  BMI 46.  6/17/2021 liver ultrasound with fatty infiltration. ETOH use socially now, variable amounts, used to drink more. No known FH of liver disease.     3/15/2017 colonoscopy for MARIANA showed nonthrombosed external hemorrhoids, normal colon and TI.    History of tobacco use--dipped/smoke, quit 4-5 years ago. No FH of colon cancer. Uncle has unknown pancreas issue.     Objective   Vital Signs:   /89   Pulse 63   Temp 98.6 °F (37 °C)   Ht 188 cm (74\")   Wt (!) 148 kg (327 lb)   SpO2 95%   BMI 41.98 kg/m²       Physical Exam  Vitals reviewed.   Constitutional:       General: He is awake. He is not in acute distress.     Appearance: Normal appearance. He is well-developed and well-groomed.   HENT:      Head: Normocephalic. "   Pulmonary:      Effort: Pulmonary effort is normal. No respiratory distress.   Abdominal:      General: Abdomen is protuberant. Bowel sounds are normal. There is no distension.      Palpations: Abdomen is soft. There is hepatomegaly. There is no mass.      Tenderness: There is no abdominal tenderness.      Comments: Mildly enlarged liver to palpation. Just below costal margin   Skin:     Coloration: Skin is not pale.   Neurological:      Mental Status: He is alert and oriented to person, place, and time.      Gait: Gait is intact.   Psychiatric:         Mood and Affect: Mood and affect normal.         Speech: Speech normal.         Behavior: Behavior is cooperative.         Judgment: Judgment normal.          Result Review :             Assessment and Plan    Diagnoses and all orders for this visit:    1. Gastroesophageal reflux disease without esophagitis (Primary)    2. Epigastric pain    3. Elevated transaminase level    4. Fatty liver  -     Hepatitis Panel, Acute  -     Anti-Smooth Muscle Antibody Titer  -     Mitochondrial Antibodies, M2  -     Ceruloplasmin  -     Alpha - 1 - Antitrypsin  -     IgG  -     Gamma GT  -     US Liver  -     ARABELLA Direct Reflex to 11 Biomarker  -     Iron Profile  -     Ferritin    5. BMI 45.0-49.9, adult    Continue omeprazole 20 mg twice daily.  Will hold off on EGD for now since he is doing better and had normal EGD in 2017. If symptoms worsen, will reconsider.     Recommend GERD diet, lifestyle changes, and weight loss to assist with reflux/dyspeptic symptoms. He is working on getting new CPAP machine for his MARC.    Persistently elevated transaminases-likely fatty liver.  Will rule out other sources with serologic workup.  Repeat liver ultrasound given last was 2021.  Needs healthy low-fat diet, increased exercise, and healthy.       Follow Up   Return in about 3 months (around 6/7/2024).    Dragon dictation used throughout this note.     Mady Goss PA-C

## 2024-03-08 LAB
A1AT SERPL-MCNC: 152 MG/DL (ref 95–164)
ANA SER QL: NEGATIVE
CERULOPLASMIN SERPL-MCNC: 27.5 MG/DL (ref 16–31)
HAV IGM SERPL QL IA: NEGATIVE
HBV CORE IGM SERPL QL IA: NEGATIVE
HBV SURFACE AG SERPL QL IA: NEGATIVE
HCV AB SERPL QL IA: NORMAL
HCV IGG SERPL QL IA: NON REACTIVE
IGG SERPL-MCNC: 1060 MG/DL (ref 603–1613)
MITOCHONDRIA M2 IGG SER-ACNC: <20 UNITS (ref 0–20)
SMA IGG SER-ACNC: 5 UNITS (ref 0–19)

## 2024-03-10 ENCOUNTER — HOSPITAL ENCOUNTER (OUTPATIENT)
Dept: SLEEP MEDICINE | Facility: HOSPITAL | Age: 39
Discharge: HOME | End: 2024-03-10
Admitting: FAMILY MEDICINE
Payer: COMMERCIAL

## 2024-03-10 DIAGNOSIS — I27.20 PULMONARY HYPERTENSION: ICD-10-CM

## 2024-03-10 DIAGNOSIS — E66.01 MORBID OBESITY: ICD-10-CM

## 2024-03-10 DIAGNOSIS — R07.89 ATYPICAL CHEST PAIN: ICD-10-CM

## 2024-03-10 DIAGNOSIS — G47.34 SLEEP RELATED HYPOXIA: ICD-10-CM

## 2024-03-10 DIAGNOSIS — R06.83 SNORING: ICD-10-CM

## 2024-03-10 DIAGNOSIS — G47.33 SEVERE OBSTRUCTIVE SLEEP APNEA: ICD-10-CM

## 2024-03-10 DIAGNOSIS — G47.10 HYPERSOMNIA: ICD-10-CM

## 2024-03-10 DIAGNOSIS — G47.8 NON-RESTORATIVE SLEEP: ICD-10-CM

## 2024-03-10 PROCEDURE — 95811 POLYSOM 6/>YRS CPAP 4/> PARM: CPT

## 2024-03-11 ENCOUNTER — TELEPHONE (OUTPATIENT)
Dept: GASTROENTEROLOGY | Facility: CLINIC | Age: 39
End: 2024-03-11
Payer: COMMERCIAL

## 2024-03-11 NOTE — TELEPHONE ENCOUNTER
----- Message from MELODIE Bridges sent at 3/8/2024  3:21 PM EST -----  Please inform the patient lab work is normal await imaging.  ----- Message -----  From: Dev, Reflab Results In  Sent: 3/7/2024   8:08 PM EST  To: Mady Goss PA-C

## 2024-03-15 DIAGNOSIS — E66.01 MORBID OBESITY: ICD-10-CM

## 2024-03-15 DIAGNOSIS — G47.34 SLEEP RELATED HYPOXIA: ICD-10-CM

## 2024-03-15 DIAGNOSIS — G47.33 SEVERE OBSTRUCTIVE SLEEP APNEA: Primary | ICD-10-CM

## 2024-03-15 DIAGNOSIS — G47.61 PERIODIC LIMB MOVEMENT DISORDER: ICD-10-CM

## 2024-03-18 ENCOUNTER — TELEPHONE (OUTPATIENT)
Dept: SLEEP MEDICINE | Facility: HOSPITAL | Age: 39
End: 2024-03-18
Payer: COMMERCIAL

## 2024-03-19 ENCOUNTER — HOSPITAL ENCOUNTER (OUTPATIENT)
Facility: HOSPITAL | Age: 39
Discharge: HOME | End: 2024-03-19
Admitting: PHYSICIAN ASSISTANT
Payer: COMMERCIAL

## 2024-03-19 PROCEDURE — 76705 ECHO EXAM OF ABDOMEN: CPT

## 2024-03-22 DIAGNOSIS — K82.4 GALLBLADDER POLYP: Primary | ICD-10-CM

## 2024-04-10 ENCOUNTER — OFFICE VISIT (OUTPATIENT)
Dept: FAMILY MEDICINE CLINIC | Facility: CLINIC | Age: 39
End: 2024-04-10
Payer: COMMERCIAL

## 2024-04-10 VITALS
DIASTOLIC BLOOD PRESSURE: 84 MMHG | OXYGEN SATURATION: 97 % | WEIGHT: 315 LBS | BODY MASS INDEX: 40.43 KG/M2 | SYSTOLIC BLOOD PRESSURE: 138 MMHG | HEART RATE: 78 BPM | HEIGHT: 74 IN

## 2024-04-10 DIAGNOSIS — J30.2 SEASONAL ALLERGIC RHINITIS, UNSPECIFIED TRIGGER: Primary | ICD-10-CM

## 2024-04-10 DIAGNOSIS — R09.81 CHRONIC NASAL CONGESTION: ICD-10-CM

## 2024-04-10 PROCEDURE — 99213 OFFICE O/P EST LOW 20 MIN: CPT

## 2024-04-10 RX ORDER — PREDNISONE 20 MG/1
20 TABLET ORAL DAILY
Qty: 7 TABLET | Refills: 0 | Status: SHIPPED | OUTPATIENT
Start: 2024-04-10

## 2024-04-10 NOTE — PROGRESS NOTES
"Chief Complaint  Nasal Congestion (Did not want any testing- he is convinced its just his allergies after spending much of this weekend outside/)    Subjective          History of Present Illness    Fazal Tavarez 38 y.o. male presents for evaluation of nasal congestion. Symptoms include congestion, sneezing, post nasal drip, and runny nose.  Onset of symptoms was  3  days ago, stable since that time. Patient denies shortness of breath, wheezing, hemoptysis, pleuritic chest pain, fever, dyspnea on exertion, eye discharge, diarrhea, vomiting, vertigo, chills, sweats, sinus pain, epistaxis, high fever.  Evaluation to date: none.  Treatment to date: OTC antihistamines and nasal steroid sprary.       He denies Covid and flu exposure.  He declined Covid testing.      Review of Systems   Constitutional:  Negative for appetite change, chills, fatigue and fever.   HENT:  Positive for congestion (nasal), postnasal drip, rhinorrhea and sneezing. Negative for ear pain, sinus pressure, sore throat and trouble swallowing.    Eyes:  Negative for visual disturbance.   Respiratory:  Negative for cough, chest tightness, shortness of breath and wheezing.    Cardiovascular:  Negative for chest pain, palpitations and leg swelling.   Gastrointestinal:  Negative for abdominal pain, constipation, diarrhea, nausea and vomiting.   Genitourinary:  Negative for dysuria, frequency and urgency.   Musculoskeletal:  Negative for gait problem, myalgias and neck pain.   Skin:  Negative for rash.   Neurological:  Negative for dizziness, syncope, weakness and light-headedness.   Psychiatric/Behavioral:  Negative for dysphoric mood. The patient is not nervous/anxious.         Objective   Vital Signs:   /84   Pulse 78   Ht 188 cm (74\")   Wt (!) 167 kg (369 lb)   SpO2 97%   BMI 47.38 kg/m²        Physical Exam  Vitals and nursing note reviewed.   Constitutional:       General: He is not in acute distress.     Appearance: He is " well-developed. He is not toxic-appearing or diaphoretic.   HENT:      Head: Normocephalic and atraumatic. Hair is normal.      Right Ear: External ear normal. No drainage, swelling or tenderness.      Left Ear: External ear normal. No drainage, swelling or tenderness.      Nose: Mucosal edema and congestion present. No rhinorrhea.      Right Nostril: No foreign body or occlusion.      Left Nostril: No foreign body or occlusion.      Right Turbinates: Swollen. Not enlarged.      Left Turbinates: Swollen. Not enlarged.      Right Sinus: No maxillary sinus tenderness or frontal sinus tenderness.      Left Sinus: No maxillary sinus tenderness or frontal sinus tenderness.      Mouth/Throat:      Mouth: Mucous membranes are moist. No oral lesions.      Pharynx: Uvula midline. No pharyngeal swelling, oropharyngeal exudate, posterior oropharyngeal erythema or uvula swelling.      Tonsils: No tonsillar exudate or tonsillar abscesses.   Eyes:      General: No scleral icterus.        Right eye: No discharge.         Left eye: No discharge.      Conjunctiva/sclera: Conjunctivae normal.      Pupils: Pupils are equal, round, and reactive to light.   Cardiovascular:      Rate and Rhythm: Normal rate and regular rhythm.      Heart sounds: Normal heart sounds. No murmur heard.     No gallop.   Pulmonary:      Effort: No respiratory distress.      Breath sounds: Normal breath sounds. No stridor or decreased air movement. No decreased breath sounds, wheezing, rhonchi or rales.   Skin:     General: Skin is warm and dry.      Findings: No rash.   Neurological:      Mental Status: He is alert and oriented to person, place, and time.      Motor: No abnormal muscle tone.   Psychiatric:         Mood and Affect: Mood normal.                         Assessment and Plan      Assessment & Plan  Seasonal allergic rhinitis, unspecified trigger  Chronic, recent flareup  Prednisone as directed  Continue allergy treatment regimen  Nasal saline  rinse as needed  Return if no improvement  Chronic nasal congestion  Chronic, recent flareup  Prednisone as directed  Continue allergy treatment regimen  Nasal saline rinse as needed  Return if no improvement     New Medications Ordered This Visit   Medications    predniSONE (DELTASONE) 20 MG tablet     Sig: Take 1 tablet by mouth Daily.     Dispense:  7 tablet     Refill:  0              Follow Up     Return if symptoms worsen or fail to improve.    Patient was given instructions and counseling regarding his condition or for health maintenance advice. Please see specific information pulled into the AVS if appropriate.     -Take medication as prescribed.  -The patient declined Covid testing today.  -Monitor for fever and take Tylenol as needed.  Drink plenty of fluids and get plenty of rest.  -Use cool-mist humidifier as needed.  -Seek immediate medical attention for fever unrelieved by Tylenol, chest pain, shortness of breath, decreased oxygen saturations, sharp pain with breathing, or any other worsening signs or symptoms.  -Return to the office is symptoms worsen or do not improve.

## 2024-05-06 ENCOUNTER — TELEPHONE (OUTPATIENT)
Dept: GASTROENTEROLOGY | Facility: CLINIC | Age: 39
End: 2024-05-06
Payer: COMMERCIAL

## 2024-05-13 ENCOUNTER — OFFICE VISIT (OUTPATIENT)
Dept: FAMILY MEDICINE CLINIC | Facility: CLINIC | Age: 39
End: 2024-05-13
Payer: COMMERCIAL

## 2024-05-13 VITALS
HEART RATE: 63 BPM | OXYGEN SATURATION: 95 % | WEIGHT: 315 LBS | RESPIRATION RATE: 16 BRPM | DIASTOLIC BLOOD PRESSURE: 80 MMHG | SYSTOLIC BLOOD PRESSURE: 128 MMHG | HEIGHT: 74 IN | BODY MASS INDEX: 40.43 KG/M2

## 2024-05-13 DIAGNOSIS — M77.8 FOREARM TENDONITIS: ICD-10-CM

## 2024-05-13 DIAGNOSIS — I10 ESSENTIAL HYPERTENSION: Primary | ICD-10-CM

## 2024-05-13 PROCEDURE — 99213 OFFICE O/P EST LOW 20 MIN: CPT | Performed by: NURSE PRACTITIONER

## 2024-05-13 RX ORDER — METHYLPREDNISOLONE 4 MG/1
TABLET ORAL
Qty: 21 TABLET | Refills: 0 | Status: SHIPPED | OUTPATIENT
Start: 2024-05-13

## 2024-05-13 NOTE — LETTER
May 13, 2024     Patient: Fazal Tavarez   YOB: 1985   Date of Visit: 5/13/2024       To Whom It May Concern:    It is my medical opinion that Fazal Tavarez may return to work in three days.            Sincerely,        MELODIE Shah    CC: No Recipients

## 2024-05-13 NOTE — PROGRESS NOTES
"Subjective   Fazal Tavarez is a 38 y.o. male.     History of Present Illness   Hypertension  Elbow Pain (R elbow pain x 3 months.  Hurts when he grabs something and rotates arm).     Since the last visit, he has overall felt fairly well.  He has Primary Hypertension and well controlled on current medication.  he has been compliant with current medications have reviewed them.  The patient denies medication side effects.  Will refill medications. /80   Pulse 63   Resp 16   Ht 188 cm (74\")   Wt (!) 169 kg (373 lb)   SpO2 95%   BMI 47.89 kg/m² .        Results for orders placed or performed in visit on 03/07/24   Hepatitis Panel, Acute    Specimen: Blood   Result Value Ref Range    Hep A IgM Negative Negative    Hepatitis B Surface Ag Negative Negative    Hep B Core IgM Negative Negative    Hepatitis C Ab Non Reactive Non Reactive   Anti-Smooth Muscle Antibody Titer    Specimen: Blood   Result Value Ref Range    Smooth Muscle Ab 5 0 - 19 Units   Mitochondrial Antibodies, M2    Specimen: Blood   Result Value Ref Range    Mitochondrial Ab <20.0 0.0 - 20.0 Units   Ceruloplasmin    Specimen: Blood   Result Value Ref Range    Ceruloplasmin 27.5 16.0 - 31.0 mg/dL   Alpha - 1 - Antitrypsin    Specimen: Blood   Result Value Ref Range    A-1 Antitrypsin 152 95 - 164 mg/dL   IgG    Specimen: Blood   Result Value Ref Range    IgG 1,060 603 - 1,613 mg/dL   Gamma GT    Specimen: Blood   Result Value Ref Range    GGT 59 8 - 61 U/L   ARABELLA Direct Reflex to 11 Biomarker    Specimen: Blood   Result Value Ref Range    ARABELLA Direct Negative Negative   Iron Profile    Specimen: Blood   Result Value Ref Range    TIBC 416 mcg/dL    UIBC 342 112 - 346 mcg/dL    Iron 74 59 - 158 mcg/dL    Iron Saturation 18 (L) 20 - 50 %   Ferritin    Specimen: Blood   Result Value Ref Range    Ferritin 30.60 30.00 - 400.00 ng/mL   Interpretation:   Result Value Ref Range    Interpretation Comment      Patient reports right lateral elbow and mostly " forearm pain for at least a couple of months.  Denies injury but is a  so does a lot of repetitive movements.  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  He states it will wax and wane in severity but never resolves.    The following portions of the patient's history were reviewed and updated as appropriate: allergies, current medications, past family history, past medical history, past social history, past surgical history, and problem list.    Review of Systems   Constitutional:  Negative for fatigue.   Respiratory:  Negative for cough and shortness of breath.    Cardiovascular:  Negative for chest pain and palpitations.   Skin:  Negative for rash.   Psychiatric/Behavioral:  Negative for dysphoric mood and sleep disturbance. The patient is not nervous/anxious.        Objective   Physical Exam  Vitals and nursing note reviewed.   Constitutional:       Appearance: Normal appearance. He is well-developed.   Cardiovascular:      Rate and Rhythm: Normal rate and regular rhythm.   Pulmonary:      Effort: Pulmonary effort is normal.      Breath sounds: Normal breath sounds.   Musculoskeletal:      Right forearm: Tenderness present. No swelling, edema, deformity or lacerations.        Arms:    Neurological:      Mental Status: He is alert and oriented to person, place, and time.   Psychiatric:         Mood and Affect: Mood normal.         Behavior: Behavior normal.         Thought Content: Thought content normal.         Judgment: Judgment normal.       Assessment & Plan   Diagnoses and all orders for this visit:    1. Essential hypertension (Primary)    2. Forearm tendonitis  -     methylPREDNISolone (MEDROL) 4 MG dose pack; Take as directed on package instructions.  Dispense: 21 tablet; Refill: 0      Does not need hypertension medication refilled at this time.  He will get a tennis elbow brace.

## 2024-05-13 NOTE — LETTER
May 13, 2024     Patient: Fazal Tavarez   YOB: 1985   Date of Visit: 5/13/2024       To Whom It May Concern:    It is my medical opinion that Fazal Tavarez may return to work in one day, on 5/14/2024           Sincerely,        MELODIE Shah    CC: No Recipients

## 2024-05-13 NOTE — LETTER
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         Sincerely,        MELODIE Shah    CC: No Recipients

## 2024-06-14 ENCOUNTER — OFFICE VISIT (OUTPATIENT)
Dept: GASTROENTEROLOGY | Facility: CLINIC | Age: 39
End: 2024-06-14
Payer: COMMERCIAL

## 2024-06-14 VITALS
WEIGHT: 315 LBS | HEART RATE: 54 BPM | HEIGHT: 74 IN | DIASTOLIC BLOOD PRESSURE: 75 MMHG | TEMPERATURE: 97.8 F | BODY MASS INDEX: 40.43 KG/M2 | SYSTOLIC BLOOD PRESSURE: 114 MMHG

## 2024-06-14 DIAGNOSIS — K82.4 GALLBLADDER POLYP: ICD-10-CM

## 2024-06-14 DIAGNOSIS — K76.0 FATTY LIVER: ICD-10-CM

## 2024-06-14 DIAGNOSIS — K21.9 GASTROESOPHAGEAL REFLUX DISEASE WITHOUT ESOPHAGITIS: Primary | ICD-10-CM

## 2024-06-14 DIAGNOSIS — R74.01 ELEVATED TRANSAMINASE LEVEL: ICD-10-CM

## 2024-06-14 NOTE — PROGRESS NOTES
"Chief Complaint  Heartburn    Subjective          History of Present Illness    Fazal Tavarez is a  38 y.o. male presents for follow-up on GERD, abdominal bloating, and fatty liver.  He is a patient Dr. Kaur last seen by me on 3/7/2024.    He is on omeprazole 20 mg twice daily for burning epigastric pain and chest fullness.  His symptoms were improving at last visit with twice daily dosing so plan was for observation and diet/lifestyle changes.    History of elevated liver function test with history of fatty infiltration of the liver.  Chronically mildly elevated transaminases. Normal alkaline phosphatase and bilirubin. BMI 46.  3/7/2024 serologic workup showed negative acute hepatitis panel, autoimmune, and genetic workup for liver disease. GGT normal at 59.  3/22/2024 ultrasound confirmed a fatty liver. It did show a 4 mm possibly gallbladder polyp.  Recommend 1 year to follow the gallbladder polyp.     From 3/7/2024 office note:  EGD 3/15/2017 for MARAINA was unremarkable. Pathology with normal duodenal biopsies, mild chronic gastritis, negative H. pylori     3/15/2017 colonoscopy for MARIANA showed nonthrombosed external hemorrhoids, normal colon and TI.     History of tobacco use--dipped/smoke, quit 4-5 years ago. No FH of colon cancer. Uncle has unknown pancreas issue.     Objective   Vital Signs:   /75   Pulse 54   Temp 97.8 °F (36.6 °C)   Ht 188 cm (74\")   Wt (!) 166 kg (366 lb 9.6 oz)   BMI 47.07 kg/m²       Physical Exam  Vitals reviewed.   Constitutional:       General: He is awake. He is not in acute distress.     Appearance: Normal appearance. He is well-developed and well-groomed.   HENT:      Head: Normocephalic.   Pulmonary:      Effort: Pulmonary effort is normal. No respiratory distress.   Skin:     Coloration: Skin is not pale.   Neurological:      Mental Status: He is alert and oriented to person, place, and time.      Gait: Gait is intact.   Psychiatric:         Mood and Affect: Mood " and affect normal.         Speech: Speech normal.         Behavior: Behavior is cooperative.         Judgment: Judgment normal.          Result Review :             Assessment and Plan    Diagnoses and all orders for this visit:    1. Gastroesophageal reflux disease without esophagitis (Primary)    2. Elevated transaminase level  -     US Elastography Parenchyma  -     US Derived Fat Fraction    3. Fatty liver  -     US Elastography Parenchyma  -     US Derived Fat Fraction    4. Gallbladder polyp    5. BMI 45.0-49.9, adult    Continue PPI for history of GERD.    For fatty liver, will check elastography and fat fraction above to see if he is candidate for Rezdiffra.     Needs to work on weight loss, low-fat diet, and increased activity which we discussed.    Ultrasound order previously placed for 1 year for follow-up on gallbladder polyp.  Patient aware.    Follow Up   Return for Follow-up based on test results.    Dragon dictation used throughout this note.     Mady Goss PA-C

## 2024-07-01 ENCOUNTER — TELEPHONE (OUTPATIENT)
Dept: SLEEP MEDICINE | Facility: HOSPITAL | Age: 39
End: 2024-07-01
Payer: COMMERCIAL

## 2024-07-01 NOTE — TELEPHONE ENCOUNTER
Patient called stating he would like his pressures increased a little bit . Pulled download for Dr review and recommendations

## 2024-07-02 ENCOUNTER — TELEPHONE (OUTPATIENT)
Dept: SLEEP MEDICINE | Facility: HOSPITAL | Age: 39
End: 2024-07-02
Payer: COMMERCIAL

## 2024-07-08 ENCOUNTER — HOSPITAL ENCOUNTER (OUTPATIENT)
Facility: HOSPITAL | Age: 39
Discharge: HOME | End: 2024-07-08
Admitting: PHYSICIAN ASSISTANT
Payer: COMMERCIAL

## 2024-07-08 PROCEDURE — 0690T QUAN US TIS CHARAC W/DX US: CPT

## 2024-07-08 PROCEDURE — 76981 USE PARENCHYMA: CPT

## 2024-08-14 ENCOUNTER — OFFICE VISIT (OUTPATIENT)
Dept: SLEEP MEDICINE | Facility: HOSPITAL | Age: 39
End: 2024-08-14
Payer: COMMERCIAL

## 2024-08-14 VITALS — HEART RATE: 93 BPM | WEIGHT: 315 LBS | HEIGHT: 74 IN | BODY MASS INDEX: 40.43 KG/M2 | OXYGEN SATURATION: 97 %

## 2024-08-14 DIAGNOSIS — G47.33 SEVERE OBSTRUCTIVE SLEEP APNEA: Primary | ICD-10-CM

## 2024-08-14 DIAGNOSIS — E66.01 MORBID OBESITY: ICD-10-CM

## 2024-08-14 PROCEDURE — 99213 OFFICE O/P EST LOW 20 MIN: CPT | Performed by: FAMILY MEDICINE

## 2024-08-14 PROCEDURE — G0463 HOSPITAL OUTPT CLINIC VISIT: HCPCS

## 2024-08-14 NOTE — PROGRESS NOTES
Follow Up [facility] Note     Chief Complaint:  MARC     Primary Care Physician: Kyleigh Potter), APRN    Interval History:   The patient is a 38 y.o. male  who was last seen in the sleep lab: 3/10/2024 for titration study; HST in 2022 showed AHI 83.9 supine .6 lowest SpO2 70%.  Advised auto CPAP 8-20 however did not tolerate.  Per PAP titration study recommended auto CPAP 15-20 cm H2O.  I also advised if you continue to have PLM's and disrupted sleep despite apnea being treated we can consider medication for PLM's.  Since starting PAP patient did call and request higher pressures therefore changed to auto CPAP 17-20.  Today patient reports improvement since last visit..    Downloaded PAP Data Reviewed For Compliance:  DME is [facility].  Downloads between 5/15/2024 - 8/12/2024.  Average usage is 4 hours 58 minutes.  Average AHI is 2.8.  Average auto CPAP pressure is 17.6 cm H2O    I have reviewed the above results and compared them with the patient's last downloads and reviewed with the patient.    Review of Systems:    A complete review of systems was done and all were negative with the exception of nasal congestion chest pain    Social History:    Social History     Socioeconomic History    Marital status: Single   Tobacco Use    Smoking status: Former     Current packs/day: 1.50     Average packs/day: 1.5 packs/day for 10.0 years (15.0 ttl pk-yrs)     Types: Cigarettes, Electronic Cigarette    Smokeless tobacco: Never    Tobacco comments:     quit e cigarettes in 2019.    Vaping Use    Vaping status: Former    Substances: Nicotine    Devices: Disposable   Substance and Sexual Activity    Alcohol use: Yes     Alcohol/week: 2.0 standard drinks of alcohol     Types: 2 Cans of beer per week    Drug use: Yes     Types: Marijuana    Sexual activity: Defer       Allergies:  Lisinopril     Medication Review:  Reviewed.      Vital Signs:    Vitals:    08/14/24 1348   Pulse: 93   SpO2: 97%   Weight:  "(!) 166 kg (365 lb)   Height: 188 cm (74\")     Body mass index is 46.86 kg/m².    Physical Exam:    Constitutional:  Well developed 38 y.o. male that appears in no apparent distress.  Awake & oriented times 3.  Normal mood with normal recent and remote memory and normal judgement.  Eyes:  Conjunctivae normal.  Oropharynx: Previously, moist mucous membranes.    Self-administered Dysart Sleepiness Scale test results: 16  0-5 Lower normal daytime sleepiness  6-10 Higher normal daytime sleepiness  11-12 Mild, 13-15 Moderate, & 16-24 Severe excessive daytime sleepiness    Impression:   1. Severe obstructive sleep apnea    2. Morbid obesity        Obstructive sleep apnea adequately treated with auto CPAP 17-20 cm H2O. The patient appears to be at goal with good compliance and usage. The patient has no complaints of hypersomnolence. Given Lydia FFM size large to try.    Plan:  Good sleep hygiene measures should be maintained.  Weight loss would be beneficial in this patient who morbidly obese by Body mass index is 46.86 kg/m²..      After evaluating the patient and assessing results available, the patient is benefiting from the treatment being provided.     The patient will continue CPAP.  After clinical evaluation and review of downloads, I recommend no changes to the patient's pressures.  A new prescription will be sent to the patient's DME.    Caution during activities that require prolonged concentration is strongly advised if sleepiness returns. Changing of PAP supplies regularly is important for effective use. Patient needs to change cushion on the mask or plugs on nasal pillows along with disposable filters once every month and change mask frame, tubing, headgear and Velcro straps every 6 months at the minimum.    I answered all of the patient's questions.  The patient will call for any problems and will follow up in 6 months.      Parish Hernandez MD  Sleep Medicine  08/14/24  14:44 EDT    "

## 2024-08-26 ENCOUNTER — OFFICE VISIT (OUTPATIENT)
Dept: FAMILY MEDICINE CLINIC | Facility: CLINIC | Age: 39
End: 2024-08-26
Payer: COMMERCIAL

## 2024-08-26 VITALS
DIASTOLIC BLOOD PRESSURE: 78 MMHG | HEART RATE: 74 BPM | HEIGHT: 74 IN | TEMPERATURE: 98.6 F | WEIGHT: 315 LBS | SYSTOLIC BLOOD PRESSURE: 120 MMHG | BODY MASS INDEX: 40.43 KG/M2 | OXYGEN SATURATION: 96 %

## 2024-08-26 DIAGNOSIS — J30.2 SEASONAL ALLERGIC RHINITIS, UNSPECIFIED TRIGGER: ICD-10-CM

## 2024-08-26 DIAGNOSIS — M25.512 ACUTE PAIN OF LEFT SHOULDER: Primary | ICD-10-CM

## 2024-08-26 DIAGNOSIS — I10 ESSENTIAL HYPERTENSION: ICD-10-CM

## 2024-08-26 DIAGNOSIS — M77.8 FOREARM TENDONITIS: ICD-10-CM

## 2024-08-26 DIAGNOSIS — R09.81 CHRONIC NASAL CONGESTION: ICD-10-CM

## 2024-08-26 PROCEDURE — 99213 OFFICE O/P EST LOW 20 MIN: CPT | Performed by: NURSE PRACTITIONER

## 2024-08-26 RX ORDER — METHYLPREDNISOLONE 4 MG
TABLET, DOSE PACK ORAL
Qty: 21 TABLET | Refills: 0 | Status: SHIPPED | OUTPATIENT
Start: 2024-08-26

## 2024-08-26 NOTE — PROGRESS NOTES
Chief Complaint  Shoulder Pain    Subjective        HPI   Kwame presents to [facility] as a 38-year-old male complaining of ongoing left shoulder pain.  States that he has had this for several months has been worse over the last week.  No known injury.  Pain is worse with any lifting or raising his arm above 90 degrees.  He describes it as aching and tight.  Does not radiate to any other area.  Intermittently 2-6 out of 10-he has taken over-the-counter Tylenol/NSAID with some symptom relief.  He has been icing the area    Denies any headaches no blurred vision no dizziness no flushing no chest pain or pressure acute in office today.    He has been having some sinus congestion/increased allergy symptoms.  He is taking Zyrtec and Astelin.  He does have albuterol inhaler to take as needed.  Denies any fever, no color to any drainage.  No cough, shortness of breath or wheezing.      No other acute complaints in office today    The following portions of the patient's history were reviewed and updated as appropriate: allergies, current medications, past family history, past medical history, past social history, past surgical history, and problem list      Review of Systems   Constitutional:  Negative for chills, fatigue and fever.   HENT:  Positive for congestion, postnasal drip, rhinorrhea and sinus pressure. Negative for ear pain and sore throat.    Eyes:  Negative for visual disturbance.   Respiratory:  Negative for cough, shortness of breath, wheezing and stridor.    Cardiovascular:  Negative for chest pain, palpitations and leg swelling.   Musculoskeletal:  Positive for arthralgias.   Neurological:  Negative for dizziness.   Psychiatric/Behavioral:  Negative for self-injury, sleep disturbance and suicidal ideas. The patient is not nervous/anxious.         Objective   Vital Signs:   Vitals:    08/26/24 1434   BP: 120/78   Pulse: 74   Temp: 98.6 °F (37 °C)   SpO2: 96%   Weight: (!) 163 kg  "(360 lb)   Height: 188 cm (74.02\")   PainSc: 0-No pain            8/26/2024     2:36 PM   PHQ-2/PHQ-9 Depression Screening   Little Interest or Pleasure in Doing Things 0-->not at all   Feeling Down, Depressed or Hopeless 0-->not at all   PHQ-9: Brief Depression Severity Measure Score 0               Physical Exam  Vitals reviewed.   Constitutional:       General: He is not in acute distress.  Eyes:      Conjunctiva/sclera: Conjunctivae normal.   Neck:      Thyroid: No thyromegaly.      Vascular: No carotid bruit.   Cardiovascular:      Rate and Rhythm: Normal rate and regular rhythm.      Heart sounds: Normal heart sounds. No murmur heard.  Pulmonary:      Effort: Pulmonary effort is normal. No respiratory distress.      Breath sounds: Normal breath sounds. No stridor. No wheezing, rhonchi or rales.   Chest:      Chest wall: No tenderness.   Musculoskeletal:      Left shoulder: No swelling, deformity, effusion, laceration, tenderness, bony tenderness or crepitus. Decreased range of motion. Normal strength. Normal pulse.        Arms:    Lymphadenopathy:      Cervical: No cervical adenopathy.   Neurological:      Mental Status: He is alert and oriented to person, place, and time.   Psychiatric:         Attention and Perception: Attention normal.         Mood and Affect: Mood normal.          Result Review :     The following data was reviewed by: MELODIE Joiner on 08/26/2024:           Assessment and Plan    Assessment & Plan  Acute pain of left shoulder  X-ray ordered today  Referral to ortho  Continue RICE    Medrol pack  Forearm tendonitis  Medrol pack  Continue RICE  Essential hypertension  Hypertension is stable and controlled  Continue current treatment regimen.  Blood pressure will be reassessed in 6 months.  Seasonal allergic rhinitis, unspecified trigger  Continue Zyrtec and Astelin  Chronic nasal congestion  Continue Zyrtec and Astelin    Orders Placed This Encounter   Procedures    XR Shoulder 2+ " View Left    Ambulatory Referral to Orthopedic Surgery     New Medications Ordered This Visit   Medications    methylPREDNISolone (MEDROL) 4 MG dose pack     Sig: Take as directed on package instructions.     Dispense:  21 tablet     Refill:  0          Follow Up   Return if symptoms worsen or fail to improve, for Follow up with PCP as Directed.  Patient was given instructions and counseling regarding his condition or for health maintenance advice. Please see specific information pulled into the AVS if appropriate.

## 2024-08-27 ENCOUNTER — HOSPITAL ENCOUNTER (OUTPATIENT)
Dept: GENERAL RADIOLOGY | Facility: HOSPITAL | Age: 39
Discharge: HOME | End: 2024-08-27
Admitting: NURSE PRACTITIONER
Payer: COMMERCIAL

## 2024-08-27 DIAGNOSIS — M25.512 ACUTE PAIN OF LEFT SHOULDER: ICD-10-CM

## 2024-08-27 PROCEDURE — 73030 X-RAY EXAM OF SHOULDER: CPT

## 2024-08-29 NOTE — PROGRESS NOTES
No acute fracture or dislocation to shoulder, but it did show some narrowing and degenerative changes that happen with use and over time.  I do recommend you follow up with Ortho with any continued pain.

## 2024-09-03 ENCOUNTER — OFFICE VISIT (OUTPATIENT)
Dept: ORTHOPEDIC SURGERY | Facility: CLINIC | Age: 39
End: 2024-09-03
Payer: COMMERCIAL

## 2024-09-03 ENCOUNTER — PATIENT ROUNDING (BHMG ONLY) (OUTPATIENT)
Dept: ORTHOPEDIC SURGERY | Facility: CLINIC | Age: 39
End: 2024-09-03
Payer: COMMERCIAL

## 2024-09-03 VITALS — BODY MASS INDEX: 40.43 KG/M2 | WEIGHT: 315 LBS | TEMPERATURE: 98.2 F | HEIGHT: 74 IN

## 2024-09-03 DIAGNOSIS — M75.42 IMPINGEMENT SYNDROME OF LEFT SHOULDER: Primary | ICD-10-CM

## 2024-09-03 PROCEDURE — 20610 DRAIN/INJ JOINT/BURSA W/O US: CPT | Performed by: ORTHOPAEDIC SURGERY

## 2024-09-03 PROCEDURE — 99204 OFFICE O/P NEW MOD 45 MIN: CPT | Performed by: ORTHOPAEDIC SURGERY

## 2024-09-03 RX ORDER — MELOXICAM 15 MG/1
TABLET ORAL
Qty: 30 TABLET | Refills: 0 | Status: SHIPPED | OUTPATIENT
Start: 2024-09-03

## 2024-09-03 RX ORDER — METHYLPREDNISOLONE ACETATE 80 MG/ML
80 INJECTION, SUSPENSION INTRA-ARTICULAR; INTRALESIONAL; INTRAMUSCULAR; SOFT TISSUE
Status: COMPLETED | OUTPATIENT
Start: 2024-09-03 | End: 2024-09-03

## 2024-09-03 RX ORDER — LIDOCAINE HYDROCHLORIDE 10 MG/ML
2 INJECTION, SOLUTION EPIDURAL; INFILTRATION; INTRACAUDAL; PERINEURAL
Status: COMPLETED | OUTPATIENT
Start: 2024-09-03 | End: 2024-09-03

## 2024-09-03 RX ADMIN — LIDOCAINE HYDROCHLORIDE 2 ML: 10 INJECTION, SOLUTION EPIDURAL; INFILTRATION; INTRACAUDAL; PERINEURAL at 13:29

## 2024-09-03 RX ADMIN — METHYLPREDNISOLONE ACETATE 80 MG: 80 INJECTION, SUSPENSION INTRA-ARTICULAR; INTRALESIONAL; INTRAMUSCULAR; SOFT TISSUE at 13:29

## 2024-09-03 NOTE — PROGRESS NOTES
New Shoulder      Patient: Fazal Tavarez        YOB: 1985    Medical Record Number: [id number]        Chief Complaints: Left shoulder pain      History of Present Illness: This is a 38-year-old patient who is right-hand-dominant presents with left shoulder pain has been ongoing for a year he states its bother him periodically and then he was using wrenches few months ago when his shoulder popped and his pain has worsened he does have night pain he is a  for [employer] [facility] he is done rest ice ibuprofen with no lasting improvement his past medical history is unremarkable other than those issues listed below he does have a BMI of 46      Allergies:   Allergies   Allergen Reactions    Lisinopril Cough       Medications:   Home Medications:  Current Outpatient Medications on File Prior to Visit   Medication Sig    albuterol sulfate  (90 Base) MCG/ACT inhaler Inhale 2 puffs Every 4 (Four) Hours As Needed for Wheezing.    azelastine (ASTELIN) 0.1 % nasal spray INSTILL 2 SPRAYS IN EACH NOSTRIL TWICE DAILY    cetirizine (zyrTEC) 10 MG tablet Take 1 tablet by mouth Daily.    Flonase Sensimist 27.5 MCG/SPRAY nasal spray 1 spray 2 (Two) Times a Day.    ketotifen (ZADITOR) 0.025 % ophthalmic solution Administer 1 drop to both eyes 2 (Two) Times a Day.    losartan (COZAAR) 100 MG tablet Take 1 tablet by mouth Daily for 270 days.    nadolol (CORGARD) 20 MG tablet 1 tablet.    Symbicort 80-4.5 MCG/ACT inhaler USE 2 INHALATIONS BY MOUTH TWICE DAILY    methylPREDNISolone (MEDROL) 4 MG dose pack Take as directed on package instructions. (Patient not taking: Reported on 9/3/2024)     No current facility-administered medications on file prior to visit.     Current Medications:  Scheduled Meds:  Continuous Infusions:No current facility-administered medications for this visit.    PRN Meds:.    Past Medical History:   Diagnosis Date    Anemia     COVID-19 12/01/2021    External hemorrhoid     GERD  "(gastroesophageal reflux disease)     Hyperlipidemia     Hypertension     Perirectal abscess     Rectal bleeding     Seasonal allergies         Past Surgical History:   Procedure Laterality Date    COLONOSCOPY W/ POLYPECTOMY N/A 3/15/2017    Procedure: COLONOSCOPY TO CECUM AND TERMINAL ILEUM;  Surgeon: Pratik Kaur MD;  Location: [facility] ENDOSCOPY;  Service:     ENDOSCOPY N/A 3/15/2017    Procedure: ESOPHAGOGASTRODUODENOSCOPY WITH BIOPSIES;  Surgeon: Pratik Kaur MD;  Location: [facility] ENDOSCOPY;  Service:     INCISION AND DRAINAGE PERIRECTAL ABSCESS N/A 4/4/2018    Procedure: INCISION AND DRAINAGE OF PERIRECTAL ABSCESS;  Surgeon: Denise White MD;  Location: [facility] MAIN OR;  Service: General    WISDOM TOOTH EXTRACTION          Social History     Occupational History    Not on file   Tobacco Use    Smoking status: Former     Current packs/day: 1.50     Average packs/day: 1.5 packs/day for 10.0 years (15.0 ttl pk-yrs)     Types: Cigarettes, Electronic Cigarette    Smokeless tobacco: Never    Tobacco comments:     quit e cigarettes in 2019.    Vaping Use    Vaping status: Former    Substances: Nicotine    Devices: Disposable   Substance and Sexual Activity    Alcohol use: Yes     Alcohol/week: 2.0 standard drinks of alcohol     Types: 2 Cans of beer per week    Drug use: Yes     Types: Marijuana    Sexual activity: Defer      Social History     Social History Narrative    Not on file        Family History   Problem Relation Age of Onset    Diabetes Father     Diabetes Paternal Uncle              Review of Systems:     Review of Systems      Physical Exam: 38 y.o. male  General Appearance:    Alert, cooperative, in no acute distress                   Vitals:    09/03/24 1304   Temp: 98.2 °F (36.8 °C)   Weight: (!) 165 kg (362 lb 11.2 oz)   Height: 188 cm (74\")   PainSc:   6      Patient is alert and read ×3 no acute distress appears her above-listed at height weight and age.  Affect is normal " respiratory rate is normal unlabored. Heart rate regular rate rhythm, sclera, dentition and hearing are normal for the purpose of this exam.    Ortho Exam  Physical exam of the left shoulder reveals no overlying skin changes no lymphedema no lymphadenopathy.  Patient has active flexion 180 with mild symptoms abduction is similar external rotation is to 50 and internal rotation to the upper lumbar spine with mild symptoms.  Patient has good rotator cuff strength 4+ over 5 with isometric strength testing with pain.  Patient has a positive impingement and a positive Roger sign.  Patient has good cervical range of motion which is full and asymptomatic no radicular symptoms.  Patient has a normal elbow exam.  Good distal pulses are presentPatient has pain with overhead activity and a positive Neer sign and a positive empty can sign  They have a positive drop arm any definitive painful arc   Large Joint Arthrocentesis: L subacromial bursa  Date/Time: 9/3/2024 1:29 PM  Consent given by: patient  Site marked: site marked  Timeout: Immediately prior to procedure a time out was called to verify the correct patient, procedure, equipment, support staff and site/side marked as required   Supporting Documentation  Indications: pain   Procedure Details  Location: shoulder - L subacromial bursa  Preparation: Patient was prepped and draped in the usual sterile fashion  Needle gauge: 21G.  Approach: posterior  Medications administered: 80 mg methylPREDNISolone acetate 80 MG/ML; 2 mL lidocaine PF 1% 1 %  Patient tolerance: patient tolerated the procedure well with no immediate complications            Radiology:   AP, Scapular Y and Axillary Lateral of the Left left shoulder were /reviewed to evauate shoulder pain.  These were reviewed in epic I have no comparative films she has significant acromioclavicular arthritis as well as some sclerosis at the insertion of the cuff  Imaging Results (Most Recent)       None           Assessment/Plan: Left shoulder pain I really think this is rotator cuff in some fashion plan is to proceed with an injection as a diagnostic and therapeutic tool I will start him on some Aloxi cam and strict precautions for short period of time and start him into physical therapy.  I will see him back in 4 to 5 weeks if he fails to improve would consider other means of testing  Cortisone Injection. See procedure note.  Cortisone Injection for DIAGNOSTIC and THERAPUTIC purposes.  Prescription medication given with instructions, warnings, and precautions.

## 2024-09-03 NOTE — PROGRESS NOTES
A [employer] Message has been sent to the patient [place] [facility] [place]

## 2024-09-12 ENCOUNTER — TELEPHONE (OUTPATIENT)
Dept: CARDIOLOGY | Facility: CLINIC | Age: 39
End: 2024-09-12

## 2024-09-12 ENCOUNTER — OFFICE VISIT (OUTPATIENT)
Dept: CARDIOLOGY | Facility: CLINIC | Age: 39
End: 2024-09-12
Payer: COMMERCIAL

## 2024-09-12 ENCOUNTER — HOSPITAL ENCOUNTER (OUTPATIENT)
Dept: CARDIOLOGY | Facility: HOSPITAL | Age: 39
Discharge: HOME | End: 2024-09-12
Admitting: NURSE PRACTITIONER
Payer: COMMERCIAL

## 2024-09-12 VITALS
BODY MASS INDEX: 40.43 KG/M2 | SYSTOLIC BLOOD PRESSURE: 124 MMHG | WEIGHT: 315 LBS | DIASTOLIC BLOOD PRESSURE: 80 MMHG | HEART RATE: 70 BPM | HEIGHT: 74 IN

## 2024-09-12 VITALS
HEIGHT: 74 IN | BODY MASS INDEX: 40.43 KG/M2 | HEART RATE: 70 BPM | SYSTOLIC BLOOD PRESSURE: 124 MMHG | OXYGEN SATURATION: 97 % | DIASTOLIC BLOOD PRESSURE: 80 MMHG | WEIGHT: 315 LBS

## 2024-09-12 DIAGNOSIS — R07.2 PRECORDIAL PAIN: ICD-10-CM

## 2024-09-12 DIAGNOSIS — J45.909 ASTHMA, UNSPECIFIED ASTHMA SEVERITY, UNSPECIFIED WHETHER COMPLICATED, UNSPECIFIED WHETHER PERSISTENT: ICD-10-CM

## 2024-09-12 DIAGNOSIS — I11.9 HYPERTENSIVE HEART DISEASE WITHOUT HEART FAILURE: ICD-10-CM

## 2024-09-12 DIAGNOSIS — E78.2 MIXED HYPERLIPIDEMIA: ICD-10-CM

## 2024-09-12 DIAGNOSIS — I10 ESSENTIAL HYPERTENSION: Primary | ICD-10-CM

## 2024-09-12 DIAGNOSIS — R00.2 PALPITATIONS: ICD-10-CM

## 2024-09-12 DIAGNOSIS — I10 ESSENTIAL HYPERTENSION: ICD-10-CM

## 2024-09-12 DIAGNOSIS — G47.33 OBSTRUCTIVE SLEEP APNEA: ICD-10-CM

## 2024-09-12 DIAGNOSIS — E66.01 MORBID (SEVERE) OBESITY DUE TO EXCESS CALORIES: ICD-10-CM

## 2024-09-12 DIAGNOSIS — K21.9 GASTROESOPHAGEAL REFLUX DISEASE WITHOUT ESOPHAGITIS: ICD-10-CM

## 2024-09-12 LAB
AORTIC DIMENSIONLESS INDEX: 0.7 (DI)
ASCENDING AORTA: 3.3 CM
BH CV ECHO MEAS - ACS: 2.5 CM
BH CV ECHO MEAS - AO MAX PG: 10.1 MMHG
BH CV ECHO MEAS - AO MEAN PG: 5.4 MMHG
BH CV ECHO MEAS - AO ROOT DIAM: 3.3 CM
BH CV ECHO MEAS - AO V2 MAX: 158.7 CM/SEC
BH CV ECHO MEAS - AO V2 VTI: 35 CM
BH CV ECHO MEAS - AVA(I,D): 2.6 CM2
BH CV ECHO MEAS - EDV(CUBED): 191.6 ML
BH CV ECHO MEAS - EDV(MOD-SP2): 92 ML
BH CV ECHO MEAS - EDV(MOD-SP4): 148 ML
BH CV ECHO MEAS - EF(MOD-BP): 62.9 %
BH CV ECHO MEAS - EF(MOD-SP2): 56.5 %
BH CV ECHO MEAS - EF(MOD-SP4): 66.2 %
BH CV ECHO MEAS - ESV(CUBED): 118.3 ML
BH CV ECHO MEAS - ESV(MOD-SP2): 40 ML
BH CV ECHO MEAS - ESV(MOD-SP4): 50 ML
BH CV ECHO MEAS - FS: 14.9 %
BH CV ECHO MEAS - IVS/LVPW: 1.02 CM
BH CV ECHO MEAS - IVSD: 1.04 CM
BH CV ECHO MEAS - LAT PEAK E' VEL: 8.9 CM/SEC
BH CV ECHO MEAS - LV DIASTOLIC VOL/BSA (35-75): 52.9 CM2
BH CV ECHO MEAS - LV MASS(C)D: 239.2 GRAMS
BH CV ECHO MEAS - LV MAX PG: 6 MMHG
BH CV ECHO MEAS - LV MEAN PG: 2.7 MMHG
BH CV ECHO MEAS - LV SYSTOLIC VOL/BSA (12-30): 17.9 CM2
BH CV ECHO MEAS - LV V1 MAX: 122.8 CM/SEC
BH CV ECHO MEAS - LV V1 VTI: 25.5 CM
BH CV ECHO MEAS - LVIDD: 5.8 CM
BH CV ECHO MEAS - LVIDS: 4.9 CM
BH CV ECHO MEAS - LVOT AREA: 3.6 CM2
BH CV ECHO MEAS - LVOT DIAM: 2.14 CM
BH CV ECHO MEAS - LVPWD: 1.02 CM
BH CV ECHO MEAS - MED PEAK E' VEL: 8.8 CM/SEC
BH CV ECHO MEAS - MR MAX PG: 17.2 MMHG
BH CV ECHO MEAS - MR MAX VEL: 207.2 CM/SEC
BH CV ECHO MEAS - MV A DUR: 0.13 SEC
BH CV ECHO MEAS - MV A MAX VEL: 79.5 CM/SEC
BH CV ECHO MEAS - MV DEC SLOPE: 567 CM/SEC2
BH CV ECHO MEAS - MV DEC TIME: 0.17 SEC
BH CV ECHO MEAS - MV E MAX VEL: 93.3 CM/SEC
BH CV ECHO MEAS - MV E/A: 1.17
BH CV ECHO MEAS - MV MAX PG: 5.6 MMHG
BH CV ECHO MEAS - MV MEAN PG: 1.28 MMHG
BH CV ECHO MEAS - MV P1/2T: 59.2 MSEC
BH CV ECHO MEAS - MV V2 VTI: 38 CM
BH CV ECHO MEAS - MVA(P1/2T): 3.7 CM2
BH CV ECHO MEAS - MVA(VTI): 2.42 CM2
BH CV ECHO MEAS - PA V2 MAX: 105.9 CM/SEC
BH CV ECHO MEAS - PULM A REVS DUR: 0.17 SEC
BH CV ECHO MEAS - PULM A REVS VEL: 25 CM/SEC
BH CV ECHO MEAS - PULM DIAS VEL: 32.1 CM/SEC
BH CV ECHO MEAS - PULM S/D: 1.09
BH CV ECHO MEAS - PULM SYS VEL: 35.2 CM/SEC
BH CV ECHO MEAS - QP/QS: 0.47
BH CV ECHO MEAS - RAP SYSTOLE: 3 MMHG
BH CV ECHO MEAS - RV MAX PG: 1.54 MMHG
BH CV ECHO MEAS - RV V1 MAX: 62.1 CM/SEC
BH CV ECHO MEAS - RV V1 VTI: 13 CM
BH CV ECHO MEAS - RVOT DIAM: 2.05 CM
BH CV ECHO MEAS - RVSP: 28.2 MMHG
BH CV ECHO MEAS - SV(LVOT): 91.9 ML
BH CV ECHO MEAS - SV(MOD-SP2): 52 ML
BH CV ECHO MEAS - SV(MOD-SP4): 98 ML
BH CV ECHO MEAS - SV(RVOT): 42.8 ML
BH CV ECHO MEAS - SVI(LVOT): 32.9 ML/M2
BH CV ECHO MEAS - SVI(MOD-SP2): 18.6 ML/M2
BH CV ECHO MEAS - SVI(MOD-SP4): 35 ML/M2
BH CV ECHO MEAS - TAPSE (>1.6): 2.32 CM
BH CV ECHO MEAS - TR MAX PG: 25.2 MMHG
BH CV ECHO MEAS - TR MAX VEL: 251.1 CM/SEC
BH CV ECHO MEASUREMENTS AVERAGE E/E' RATIO: 10.54
BH CV XLRA - RV BASE: 3.8 CM
BH CV XLRA - RV LENGTH: 8 CM
BH CV XLRA - RV MID: 2.41 CM
BH CV XLRA - TDI S': 7.5 CM/SEC
LEFT ATRIUM VOLUME INDEX: 21.7 ML/M2
SINUS: 3.6 CM
STJ: 3.4 CM

## 2024-09-12 PROCEDURE — 99214 OFFICE O/P EST MOD 30 MIN: CPT | Performed by: INTERNAL MEDICINE

## 2024-09-12 PROCEDURE — 93306 TTE W/DOPPLER COMPLETE: CPT

## 2024-09-12 PROCEDURE — 93000 ELECTROCARDIOGRAM COMPLETE: CPT | Performed by: INTERNAL MEDICINE

## 2024-09-12 PROCEDURE — 25510000001 PERFLUTREN 6.52 MG/ML SUSPENSION 2 ML VIAL: Performed by: NURSE PRACTITIONER

## 2024-09-12 RX ADMIN — PERFLUTREN 1 ML: 6.52 INJECTION, SUSPENSION INTRAVENOUS at 09:49

## 2024-09-23 ENCOUNTER — TELEPHONE (OUTPATIENT)
Dept: FAMILY MEDICINE CLINIC | Facility: CLINIC | Age: 39
End: 2024-09-23
Payer: COMMERCIAL

## 2024-09-24 ENCOUNTER — TREATMENT (OUTPATIENT)
Dept: PHYSICAL THERAPY | Facility: CLINIC | Age: 39
End: 2024-09-24
Payer: COMMERCIAL

## 2024-09-24 DIAGNOSIS — R29.3 POOR POSTURE: ICD-10-CM

## 2024-09-24 DIAGNOSIS — R29.898 DECREASED STRENGTH OF UPPER EXTREMITY: ICD-10-CM

## 2024-09-24 DIAGNOSIS — M75.42 IMPINGEMENT SYNDROME OF LEFT SHOULDER: Primary | ICD-10-CM

## 2024-09-24 PROCEDURE — 97161 PT EVAL LOW COMPLEX 20 MIN: CPT

## 2024-09-24 PROCEDURE — 97530 THERAPEUTIC ACTIVITIES: CPT

## 2024-09-24 PROCEDURE — 97110 THERAPEUTIC EXERCISES: CPT

## 2024-10-01 ENCOUNTER — TREATMENT (OUTPATIENT)
Dept: PHYSICAL THERAPY | Facility: CLINIC | Age: 39
End: 2024-10-01
Payer: COMMERCIAL

## 2024-10-01 DIAGNOSIS — R29.3 POOR POSTURE: ICD-10-CM

## 2024-10-01 DIAGNOSIS — R29.898 DECREASED STRENGTH OF UPPER EXTREMITY: ICD-10-CM

## 2024-10-01 DIAGNOSIS — M75.42 IMPINGEMENT SYNDROME OF LEFT SHOULDER: Primary | ICD-10-CM

## 2024-10-01 PROCEDURE — 97110 THERAPEUTIC EXERCISES: CPT

## 2024-10-01 PROCEDURE — 97530 THERAPEUTIC ACTIVITIES: CPT

## 2024-10-01 NOTE — PROGRESS NOTES
Physical Therapy Daily Treatment Note               [address]                                                                                                                                             [place] [address]    Patient: Fazal Tavarez   : 1985  Referring practitioner: Kelly Schultz MD  Date of Initial Visit: Type: THERAPY  Noted: 2024  Today's Date: 10/1/2024  Patient seen for 2 sessions       Visit Diagnoses:    ICD-10-CM ICD-9-CM   1. Impingement syndrome of left shoulder  M75.42 726.2   2. Decreased strength of upper extremity  R29.898 729.89   3. Poor posture  R29.3 781.92       Subjective Evaluation    History of Present Illness    Subjective comment: Pt denies any current shoulder pain.       Objective   See Exercise, Manual, and Modality Logs for complete treatment.   Added Resisted shlder ER/IR , AROM shlder flex/abd ,B horizontal abd vs tband , UBE, and AAROM shoulder Abd-pvc     Assessment & Plan       Assessment  Assessment details: Pt completed treatment with no c/o pain in (L) shoulder.  Pt tolerated the addition of shoulder strengthening exercises with no issues.  His HEP was updated and given to him.  Plan to progress per POC.          Timed:         Manual Therapy:    0     mins  69909;     Therapeutic Exercise:    27     mins  20781;     Neuromuscular Paulie:    0    mins  65422;    Therapeutic Activity:     15     mins  86915;     Gait Trainin     mins  58914;     Ultrasound:     0     mins  47715;    E Stim                            0    mins   35673( [id number])  Work Song/Cond      0    mins   75681        Timed Treatment:   42   mins   Total Treatment:     42   mins    Jimenez Harrell PTA  [place] License: [id number]

## 2024-10-03 ENCOUNTER — TREATMENT (OUTPATIENT)
Dept: PHYSICAL THERAPY | Facility: CLINIC | Age: 39
End: 2024-10-03
Payer: COMMERCIAL

## 2024-10-03 DIAGNOSIS — M75.42 IMPINGEMENT SYNDROME OF LEFT SHOULDER: Primary | ICD-10-CM

## 2024-10-03 DIAGNOSIS — R29.3 POOR POSTURE: ICD-10-CM

## 2024-10-03 DIAGNOSIS — R29.898 DECREASED STRENGTH OF UPPER EXTREMITY: ICD-10-CM

## 2024-10-03 PROCEDURE — 97110 THERAPEUTIC EXERCISES: CPT

## 2024-10-03 NOTE — PROGRESS NOTES
Physical Therapy Daily Progress Note                                            [address]                                                           [place], [place]                                                         [phone]    Patient: Fazal Tavarez   : 1985  Diagnosis/ICD-10 Code:  Impingement syndrome of left shoulder [M75.42]  Referring practitioner: Kelly Schultz MD  Date of Initial Visit: Type: THERAPY  Noted: 2024  Today's Date: 10/3/2024  Patient seen for 3 sessions           Subjective Evaluation    History of Present Illness    Subjective comment: Kwame reports that his L shoulder is not feeling too bad. he was slightly sore after his first treatment session but it did not last too long       Objective     See Exercise, Manual, and Modality Logs for complete treatment.     Assessment & Plan       Assessment  Assessment details: Kwame tolerated his session very well today and was able to perform all L UE strengthening without reports of pain. He demonstrated full L shoulder AROM through flexion and abduction and progressed theraband resisted exercises using a heavier band. Plan to continue progressing L shoulder and scapular strengthening as tolerated.        Progress per Plan of Care           Manual Therapy:    0     mins  38651;  Therapeutic Exercise:    31     mins  19883;     Neuromuscular Paulie:    0    mins  19183;    Therapeutic Activity:     0     mins  81894;     Gait Trainin     mins  41472;     Ultrasound:     0     mins  31908;    Electrical Stimulation:    0     mins  87250 ( );  Dry Needling     0     mins self-pay    Timed Treatment:   31   mins   Total Treatment:     31   mins    Mitchell Tam PT, DPT  Physical Therapist  [place] License #: [id number]  Mitchell Tam PT, 10/03/24, 3:26 PM EDT

## 2024-10-10 ENCOUNTER — TREATMENT (OUTPATIENT)
Dept: PHYSICAL THERAPY | Facility: CLINIC | Age: 39
End: 2024-10-10
Payer: COMMERCIAL

## 2024-10-10 DIAGNOSIS — R29.898 DECREASED STRENGTH OF UPPER EXTREMITY: ICD-10-CM

## 2024-10-10 DIAGNOSIS — M75.42 IMPINGEMENT SYNDROME OF LEFT SHOULDER: Primary | ICD-10-CM

## 2024-10-10 DIAGNOSIS — R29.3 POOR POSTURE: ICD-10-CM

## 2024-10-10 PROCEDURE — 97110 THERAPEUTIC EXERCISES: CPT

## 2024-10-10 NOTE — PROGRESS NOTES
Physical Therapy Daily Progress Note                                            [address] [facility]                                                           [place], [place]                                                         [phone]    Patient: Fazal Tavarez   : 1985  Diagnosis/ICD-10 Code:  Impingement syndrome of left shoulder [M75.42]  Referring practitioner: Kelly Schultz MD  Date of Initial Visit: Type: THERAPY  Noted: 2024  Today's Date: 10/10/2024  Patient seen for 4 sessions           Subjective Evaluation    History of Present Illness    Subjective comment: ruba reports persisting improvement in L shoulder pain. He has not had much soreness at all       Objective   See Exercise, Manual, and Modality Logs for complete treatment.       Assessment & Plan       Assessment  Assessment details: Ruba has made significant progress regarding L shoulder range of motion and decreased symptoms over the past 2 weeks. He has been very independent with his HEP and has no need for verbal cueing while performing exercises in the clinic. Ruba has been able to appropriately progress all shoulder and scapulothoracic strengthening. Today scapulothoracic strengthening was progressed into standing exercises such as bent over row and standing latt pull downs in order to provide strengthening in functional positions. Due to his significant decreased in L shoulder pain and great compliance to his HEP, he is agreeable and appropriate for discharge at this time. He will return to his referring provider if his L shoulder pain returns.       Progress per Plan of Care           Manual Therapy:    0     mins  44958;  Therapeutic Exercise:    30     mins  02825;     Neuromuscular Paulie:    0    mins  82234;    Therapeutic Activity:     0     mins  99638;     Gait Trainin     mins  65876;     Ultrasound:     0     mins  41436;    Electrical Stimulation:    0     mins  35596 (  );  Dry Needling     0     mins self-pay    Timed Treatment:   30   mins   Total Treatment:     30   mins    Mitchell Tam PT, DPT  Physical Therapist  [place] License #: [id number]  Mitchell Tam PT, 10/10/24, 3:26 PM EDT

## 2024-12-18 ENCOUNTER — DOCUMENTATION (OUTPATIENT)
Dept: PHYSICAL THERAPY | Facility: CLINIC | Age: 39
End: 2024-12-18
Payer: COMMERCIAL

## 2025-02-12 ENCOUNTER — OFFICE VISIT (OUTPATIENT)
Dept: SLEEP MEDICINE | Facility: HOSPITAL | Age: 40
End: 2025-02-12
Payer: COMMERCIAL

## 2025-02-12 VITALS — HEART RATE: 85 BPM | WEIGHT: 315 LBS | OXYGEN SATURATION: 94 % | HEIGHT: 74 IN | BODY MASS INDEX: 40.43 KG/M2

## 2025-02-12 DIAGNOSIS — E66.01 MORBID OBESITY: ICD-10-CM

## 2025-02-12 DIAGNOSIS — Z78.9 DIFFICULTY WITH CPAP FULL FACE MASK USE: ICD-10-CM

## 2025-02-12 DIAGNOSIS — G47.33 SEVERE OBSTRUCTIVE SLEEP APNEA: Primary | ICD-10-CM

## 2025-02-12 PROCEDURE — G0463 HOSPITAL OUTPT CLINIC VISIT: HCPCS

## 2025-02-12 NOTE — PROGRESS NOTES
[facility] Note     Chief Complaint:  MARC     Primary Care Physician: Kyleigh Potter), APRN    Interval History:   The patient is a 39 y.o. male  who was last seen in the sleep lab: August 2024.HST in 2022 showed AHI 83.9 supine .6 lowest SpO2 70%. Advised auto CPAP 8-20 however did not tolerate. Per PAP titration study recommended auto CPAP 15-20 cm H2O. I also advised if you continue to have PLM's and disrupted sleep despite apnea being treated we can consider medication for PLM's. Since starting PAP patient did call and request higher pressures therefore changed to auto CPAP 17-20.  Doing better with higher pressures however having mask issues since last visit.  When he has nasal or sinus congestion is difficult to tolerate mask.  Does follow with an allergist however has not seen in some time.  Over-the-counter medications are not working.    Downloaded PAP Data Reviewed For Compliance:  DME is [facility].  Downloads between 1/12/2025 - 2/10/2025.  Average usage is 4 hours 14 minutes.  Average AHI is 2.7.  Average auto CPAP pressure is 18 cm H2O    I have reviewed the above results and compared them with the patient's last downloads and reviewed with the patient.    Review of Systems:    A complete review of systems was done and all were negative with the exception of see scanned media    Social History:    Social History     Socioeconomic History    Marital status: Single   Tobacco Use    Smoking status: Former     Current packs/day: 1.50     Average packs/day: 1.5 packs/day for 10.0 years (15.0 ttl pk-yrs)     Types: Cigarettes, Electronic Cigarette    Smokeless tobacco: Never    Tobacco comments:     quit e cigarettes in 2019.    Vaping Use    Vaping status: Former    Substances: Nicotine    Devices: Disposable   Substance and Sexual Activity    Alcohol use: Yes     Alcohol/week: 2.0 standard drinks of alcohol     Types: 2 Cans of beer per week    Drug use: Yes     Types:  "Marijuana    Sexual activity: Defer       Allergies:  Lisinopril     Medication Review:  Reviewed.      Vital Signs:    Vitals:    02/12/25 0700   Pulse: 85   SpO2: 94%   Weight: (!) 173 kg (382 lb)   Height: 188 cm (74\")     Body mass index is 49.05 kg/m².    Physical Exam:    Constitutional:  Well developed 39 y.o. male that appears in no apparent distress.  Awake & oriented times 3.  Normal mood with normal recent and remote memory and normal judgement.  Eyes:  Conjunctivae normal.  Oropharynx: Previously, moist mucous membranes.    Self-administered Dana Sleepiness Scale test results: See scanned media  0-5 Lower normal daytime sleepiness  6-10 Higher normal daytime sleepiness  11-12 Mild, 13-15 Moderate, & 16-24 Severe excessive daytime sleepiness    Impression:   1. Severe obstructive sleep apnea    2. Morbid obesity    3. Difficulty with CPAP full face mask use        Obstructive sleep apnea adequately treated with auto CPAP 17-20 cm H2O; some decreased usage due to mask fit issues.  Patient to talk to DME about mask fit and will also meet up with allergist to address nasal congestion issue.  The patient has improved complaints of hypersomnolence when able to use Pap all night.    Plan:  Good sleep hygiene measures should be maintained.  Weight loss would be beneficial in this patient who is morbidly obese by Body mass index is 49.05 kg/m²..      After evaluating the patient and assessing results available, the patient is benefiting from the treatment being provided.     The patient will continue PAP.  After clinical evaluation and review of downloads, I recommend no changes to the patient's pressures.  A new prescription will be sent to the patient's DME.    Caution during activities that require prolonged concentration is strongly advised if sleepiness returns. Changing of PAP supplies regularly is important for effective use. Patient needs to change cushion on the mask or plugs on nasal pillows along " with disposable filters once every month and change mask frame, tubing, headgear and Velcro straps every 6 months at the minimum.    I answered all of the patient's questions.  The patient will call for any problems and will follow up in 6 to 8 months.      Parish Hernandez MD  Sleep Medicine  02/12/25  07:20 EST

## 2025-03-04 RX ORDER — LOSARTAN POTASSIUM 100 MG/1
100 TABLET ORAL DAILY
Qty: 30 TABLET | Refills: 0 | Status: SHIPPED | OUTPATIENT
Start: 2025-03-04

## 2025-03-19 NOTE — PROGRESS NOTES
Date of Office Visit: 2025  Encounter Provider: MELODIE Mixon  [facility]  Patient Name: Fazal Tavarez  :1985    Chief complaint  HTN     History of Present Illness  Patient is a 39 y.o. year old male  patient of Dr. Figueroa. Past medical history includes hypertension, hyperlipidemia who was seen in the emergency room on 2024 for fluttering and chest pain.  D-dimer was unremarkable.  Echocardiogram showed an ejection fraction of 72% with mild left ventricular hypertrophy, normal diastolic function with RVSP 37mmHg.  Stress perfusion study was negative for ischemia.  Losartan was increased.  He was sent home with a Zio patch which was normal.  Diary was not reported.     Interval history  Patient presents today for routine follow-up.  I will visit with him today and have reviewed his medical record.  Since last visit he is wearing CPAP consistently and following with sleep medicine.  He denies palpitations, edema, dizziness, chest pain or chest pressure, syncope or presyncope.  He has seen GI and is now on omeprazole.  He does have chronic dyspnea on exertion that is unchanged, though he continues to smoke marijuana. He does not routinely exercise.    Past Medical History:   Diagnosis Date    Anemia     COVID-19 2021    External hemorrhoid     GERD (gastroesophageal reflux disease)     Hyperlipidemia     Hypertension     Perirectal abscess     Rectal bleeding     Seasonal allergies      Past Surgical History:   Procedure Laterality Date    COLONOSCOPY W/ POLYPECTOMY N/A 3/15/2017    Procedure: COLONOSCOPY TO CECUM AND TERMINAL ILEUM;  Surgeon: Pratik Kaur MD;  Location: [facility] ENDOSCOPY;  Service:     ENDOSCOPY N/A 3/15/2017    Procedure: ESOPHAGOGASTRODUODENOSCOPY WITH BIOPSIES;  Surgeon: Pratik Kaur MD;  Location: [facility] ENDOSCOPY;  Service:     INCISION AND DRAINAGE PERIRECTAL ABSCESS N/A 2018     Procedure: INCISION AND DRAINAGE OF PERIRECTAL ABSCESS;  Surgeon: Denise White MD;  Location: [facility];  Service: General    WISDOM TOOTH EXTRACTION       Outpatient Medications Prior to Visit   Medication Sig Dispense Refill    albuterol sulfate  (90 Base) MCG/ACT inhaler Inhale 2 puffs Every 4 (Four) Hours As Needed for Wheezing.      azelastine (ASTELIN) 0.1 % nasal spray INSTILL 2 SPRAYS IN EACH NOSTRIL TWICE DAILY      cetirizine (zyrTEC) 10 MG tablet Take 1 tablet by mouth Daily.      Flonase Sensimist 27.5 MCG/SPRAY nasal spray 1 spray 2 (Two) Times a Day.      ketotifen (ZADITOR) 0.025 % ophthalmic solution Administer 1 drop to both eyes 2 (Two) Times a Day.      losartan (COZAAR) 100 MG tablet TAKE ONE TABLET BY MOUTH EVERY DAY 30 tablet 0    nadolol (CORGARD) 20 MG tablet 1 tablet.      omeprazole (priLOSEC) 40 MG capsule Take 1 capsule by mouth Daily.      Symbicort 80-4.5 MCG/ACT inhaler USE 2 INHALATIONS BY MOUTH TWICE DAILY      meloxicam (MOBIC) 15 MG tablet 1 PO Daily with food. (Patient not taking: Reported on 3/21/2025) 30 tablet 0    methylPREDNISolone (MEDROL) 4 MG dose pack Take as directed on package instructions. 21 tablet 0    omeprazole (priLOSEC) 20 MG capsule Take 1 capsule by mouth 2 (Two) Times a Day. (Patient not taking: Reported on 3/21/2025) 180 capsule 1     No facility-administered medications prior to visit.       Allergies as of 03/21/2025 - Reviewed 03/21/2025   Allergen Reaction Noted    Lisinopril Cough 09/02/2021     Social History     Socioeconomic History    Marital status: Single   Tobacco Use    Smoking status: Former     Current packs/day: 1.50     Average packs/day: 1.5 packs/day for 10.0 years (15.0 ttl pk-yrs)     Types: Cigarettes, Electronic Cigarette    Smokeless tobacco: Never    Tobacco comments:     quit e cigarettes in 2019.    Vaping Use    Vaping status: Former    Substances: Nicotine    Devices: Disposable   Substance and Sexual Activity     "Alcohol use: Yes     Alcohol/week: 2.0 standard drinks of alcohol     Types: 2 Cans of beer per week    Drug use: Yes     Types: Marijuana    Sexual activity: Defer     Family History   Problem Relation Age of Onset    Diabetes Father     Diabetes Paternal Uncle      Review of Systems   Constitutional: Positive for malaise/fatigue.   Cardiovascular:  Positive for dyspnea on exertion. Negative for chest pain, claudication, leg swelling, near-syncope, orthopnea, palpitations, paroxysmal nocturnal dyspnea and syncope.   Respiratory:  Negative for shortness of breath.    Neurological:  Negative for brief paralysis, dizziness, headaches and light-headedness.   All other systems reviewed and are negative.       Objective:     Vitals:    03/21/25 0908   BP: 122/86   BP Location: Left arm   Patient Position: Sitting   Cuff Size: Large Adult   Pulse: 56   Weight: (!) 181 kg (399 lb)   Height: 188 cm (74\")     Body mass index is 51.23 kg/m².    Vitals reviewed.   Constitutional:       General: Not in acute distress.     Appearance: Well-developed and not in distress. Not diaphoretic.   HENT:      Head: Normocephalic.   Pulmonary:      Effort: Pulmonary effort is normal. No respiratory distress.      Breath sounds: Normal breath sounds. No wheezing. No rhonchi. No rales.   Cardiovascular:      Normal rate. Regular rhythm.      Murmurs: There is no murmur.   Pulses:     Radial: 2+ bilaterally.  Edema:     Peripheral edema absent.   Skin:     General: Skin is warm and dry. There is no cyanosis.      Findings: No rash.   Neurological:      Mental Status: Alert and oriented to person, place, and time.   Psychiatric:         Behavior: Behavior normal. Behavior is cooperative.         Thought Content: Thought content normal.         Judgment: Judgment normal.       Lab Review:     Lab Results   Component Value Date     01/26/2024     06/21/2023    K 3.7 01/26/2024    K 4.9 06/21/2023     01/26/2024     " "06/21/2023    CO2 22.3 01/26/2024    CO2 29.1 (H) 06/21/2023    BUN 12 01/26/2024    BUN 11 06/21/2023    CREATININE 0.82 01/26/2024    CREATININE 0.91 06/21/2023    EGFRIFNONA 108 09/13/2021    EGFRIFNONA 105 06/03/2021    EGFRIFAFRI 131 09/13/2021    EGFRIFAFRI 127 06/03/2021    GLUCOSE 85 01/26/2024    GLUCOSE 88 06/21/2023    CALCIUM 9.1 01/26/2024    CALCIUM 9.9 06/21/2023    ALBUMIN 4.5 01/26/2024    ALBUMIN 4.5 06/21/2023    BILITOT 0.2 01/26/2024    BILITOT 0.3 06/21/2023    AST 36 01/26/2024    AST 33 06/21/2023    ALT 48 (H) 01/26/2024    ALT 39 06/21/2023     Lab Results   Component Value Date    WBC 9.31 01/26/2024    WBC 8.87 06/21/2023    HGB 13.0 01/26/2024    HGB 13.8 06/21/2023    HCT 40.2 01/26/2024    HCT 40.5 06/21/2023    MCV 87.8 01/26/2024    MCV 86.4 06/21/2023     01/26/2024     06/21/2023     No results found for: \"PROBNP\", \"BNP\"  Lab Results   Component Value Date    TROPONINT 10 01/27/2024     Lab Results   Component Value Date    TSH 2.290 06/21/2023    TSH 1.710 11/11/2020             ECG 12 Lead    Date/Time: 3/21/2025 9:32 AM  Performed by: Mady Munoz APRN    Authorized by: Mady Munoz APRN  Comparison: compared with previous ECG   Similar to previous ECG  Rhythm: sinus rhythm  Rate: normal  BPM: 56  QRS axis: normal  Comments: Similar to prior        Assessment:       Diagnosis Plan   1. Hypertensive heart disease without heart failure        2. Essential hypertension        3. Mixed hyperlipidemia        4. Precordial pain        5. Palpitations        6. Morbid (severe) obesity due to excess calories        7. Gastroesophageal reflux disease without esophagitis        8. Obstructive sleep apnea          Plan:       1.  Hypertension with hypertensive heart disease.  Echocardiogram 9/2024 with normal left ventricular wall thickness, normal diastolic function, and normal pulmonary pressures. Repeat echo completed today with results pending.  2.  Chest " pain.  Resolved with treatment of GERD.   3.  Hyperlipidemia. He is due for routine labs with PCP and will call for an appointment  4.  Palpitations with negative Ziopatch 1/2024.  Symptoms have resolved.  5.  Obstructive sleep apnea, on CPAP therapy  6.  Mild pulmonary hypertension.  Resolved with treatment of sleep apnea and hypertension.      Time Spent: I spent 30 minutes caring for Fazal on this date of service. This time includes time spent by me in the following activities: preparing for the visit, reviewing tests, performing a medically appropriate examination and/or evaluations, counseling and educating the patient/family/caregiver, ordering medications, tests, or procedures, documenting information in the medical record, and independently interpreting results and communicating that information with the patient/family/caregiver.   I spent 1 minutes on the separately reported service of ECG. This time is not included in the time used to support the E/M service also reported today.        Your medication list            Accurate as of March 21, 2025  9:32 AM. If you have any questions, ask your nurse or doctor.                CONTINUE taking these medications        Instructions Last Dose Given Next Dose Due   albuterol sulfate  (90 Base) MCG/ACT inhaler  Commonly known as: PROVENTIL HFA;VENTOLIN HFA;PROAIR HFA      Inhale 2 puffs Every 4 (Four) Hours As Needed for Wheezing.       azelastine 0.1 % nasal spray  Commonly known as: ASTELIN      INSTILL 2 SPRAYS IN EACH NOSTRIL TWICE DAILY       cetirizine 10 MG tablet  Commonly known as: zyrTEC      Take 1 tablet by mouth Daily.       Flonase Sensimist 27.5 MCG/SPRAY nasal spray  Generic drug: fluticasone      1 spray 2 (Two) Times a Day.       ketotifen 0.025 % ophthalmic solution  Commonly known as: ZADITOR      Administer 1 drop to both eyes 2 (Two) Times a Day.       losartan 100 MG tablet  Commonly known as: COZAAR      TAKE ONE TABLET BY MOUTH  EVERY DAY       nadolol 20 MG tablet  Commonly known as: CORGARD      1 tablet.       omeprazole 40 MG capsule  Commonly known as: priLOSEC      Take 1 capsule by mouth Daily.       Symbicort 80-4.5 MCG/ACT inhaler  Generic drug: budesonide-formoterol      USE 2 INHALATIONS BY MOUTH TWICE DAILY                Patient is no longer taking -.  I corrected the med list to reflect this.  I did not stop these medications.    Return in about 6 months (around 9/21/2025) for with Dr. Figueroa.      Dictated utilizing Dragon dictation

## 2025-03-21 ENCOUNTER — HOSPITAL ENCOUNTER (OUTPATIENT)
Dept: CARDIOLOGY | Facility: HOSPITAL | Age: 40
Discharge: HOME | End: 2025-03-21
Payer: COMMERCIAL

## 2025-03-21 ENCOUNTER — HOSPITAL ENCOUNTER (OUTPATIENT)
Facility: HOSPITAL | Age: 40
Discharge: HOME | End: 2025-03-21
Payer: COMMERCIAL

## 2025-03-21 ENCOUNTER — OFFICE VISIT (OUTPATIENT)
Dept: CARDIOLOGY | Facility: CLINIC | Age: 40
End: 2025-03-21
Payer: COMMERCIAL

## 2025-03-21 VITALS
DIASTOLIC BLOOD PRESSURE: 86 MMHG | HEART RATE: 56 BPM | HEIGHT: 74 IN | SYSTOLIC BLOOD PRESSURE: 122 MMHG | BODY MASS INDEX: 40.43 KG/M2 | WEIGHT: 315 LBS

## 2025-03-21 VITALS
HEART RATE: 68 BPM | SYSTOLIC BLOOD PRESSURE: 158 MMHG | HEIGHT: 74 IN | BODY MASS INDEX: 40.43 KG/M2 | OXYGEN SATURATION: 97 % | DIASTOLIC BLOOD PRESSURE: 80 MMHG | WEIGHT: 315 LBS

## 2025-03-21 DIAGNOSIS — R07.2 PRECORDIAL PAIN: ICD-10-CM

## 2025-03-21 DIAGNOSIS — I11.9 HYPERTENSIVE HEART DISEASE WITHOUT HEART FAILURE: ICD-10-CM

## 2025-03-21 DIAGNOSIS — I11.9 HYPERTENSIVE HEART DISEASE WITHOUT HEART FAILURE: Primary | ICD-10-CM

## 2025-03-21 DIAGNOSIS — K82.4 GALLBLADDER POLYP: ICD-10-CM

## 2025-03-21 DIAGNOSIS — K21.9 GASTROESOPHAGEAL REFLUX DISEASE WITHOUT ESOPHAGITIS: ICD-10-CM

## 2025-03-21 DIAGNOSIS — E66.01 MORBID (SEVERE) OBESITY DUE TO EXCESS CALORIES: ICD-10-CM

## 2025-03-21 DIAGNOSIS — I10 ESSENTIAL HYPERTENSION: ICD-10-CM

## 2025-03-21 DIAGNOSIS — R00.2 PALPITATIONS: ICD-10-CM

## 2025-03-21 DIAGNOSIS — E78.2 MIXED HYPERLIPIDEMIA: ICD-10-CM

## 2025-03-21 DIAGNOSIS — G47.33 OBSTRUCTIVE SLEEP APNEA: ICD-10-CM

## 2025-03-21 LAB
AORTIC ARCH: 3 CM
AORTIC DIMENSIONLESS INDEX: 0.83 (DI)
ASCENDING AORTA: 3.4 CM
AV MEAN PRESS GRAD SYS DOP V1V2: 4 MMHG
AV VMAX SYS DOP: 135 CM/SEC
BH CV ECHO MEAS - ACS: 2.5 CM
BH CV ECHO MEAS - AO MAX PG: 7.3 MMHG
BH CV ECHO MEAS - AO ROOT AREA (BSA CORRECTED): 1.3 CM2
BH CV ECHO MEAS - AO ROOT DIAM: 3.6 CM
BH CV ECHO MEAS - AO V2 VTI: 31.8 CM
BH CV ECHO MEAS - AVA(I,D): 3.1 CM2
BH CV ECHO MEAS - EDV(CUBED): 205.4 ML
BH CV ECHO MEAS - EDV(MOD-SP2): 133 ML
BH CV ECHO MEAS - EDV(MOD-SP4): 146 ML
BH CV ECHO MEAS - EF(MOD-SP2): 54.9 %
BH CV ECHO MEAS - EF(MOD-SP4): 54.1 %
BH CV ECHO MEAS - ESV(CUBED): 46.2 ML
BH CV ECHO MEAS - ESV(MOD-SP2): 60 ML
BH CV ECHO MEAS - ESV(MOD-SP4): 67 ML
BH CV ECHO MEAS - FS: 39.2 %
BH CV ECHO MEAS - IVS/LVPW: 0.92 CM
BH CV ECHO MEAS - IVSD: 1.1 CM
BH CV ECHO MEAS - LAT PEAK E' VEL: 12.6 CM/SEC
BH CV ECHO MEAS - LV DIASTOLIC VOL/BSA (35-75): 51 CM2
BH CV ECHO MEAS - LV MASS(C)D: 288.5 GRAMS
BH CV ECHO MEAS - LV MAX PG: 6 MMHG
BH CV ECHO MEAS - LV MEAN PG: 3 MMHG
BH CV ECHO MEAS - LV SYSTOLIC VOL/BSA (12-30): 23.4 CM2
BH CV ECHO MEAS - LV V1 MAX: 122 CM/SEC
BH CV ECHO MEAS - LV V1 VTI: 26.5 CM
BH CV ECHO MEAS - LVIDD: 5.9 CM
BH CV ECHO MEAS - LVIDS: 3.6 CM
BH CV ECHO MEAS - LVOT AREA: 3.7 CM2
BH CV ECHO MEAS - LVOT DIAM: 2.18 CM
BH CV ECHO MEAS - LVPWD: 1.2 CM
BH CV ECHO MEAS - MED PEAK E' VEL: 11.2 CM/SEC
BH CV ECHO MEAS - MV A DUR: 0.12 SEC
BH CV ECHO MEAS - MV A MAX VEL: 82.7 CM/SEC
BH CV ECHO MEAS - MV DEC SLOPE: 335.7 CM/SEC2
BH CV ECHO MEAS - MV DEC TIME: 0.25 SEC
BH CV ECHO MEAS - MV E MAX VEL: 85.6 CM/SEC
BH CV ECHO MEAS - MV E/A: 1.04
BH CV ECHO MEAS - MV MAX PG: 3.8 MMHG
BH CV ECHO MEAS - MV MEAN PG: 2.38 MMHG
BH CV ECHO MEAS - MV P1/2T: 84 MSEC
BH CV ECHO MEAS - MV V2 VTI: 34.9 CM
BH CV ECHO MEAS - MVA(P1/2T): 2.6 CM2
BH CV ECHO MEAS - MVA(VTI): 2.8 CM2
BH CV ECHO MEAS - PA ACC TIME: 0.08 SEC
BH CV ECHO MEAS - PA V2 MAX: 108.4 CM/SEC
BH CV ECHO MEAS - PULM A REVS DUR: 0.11 SEC
BH CV ECHO MEAS - PULM A REVS VEL: 33 CM/SEC
BH CV ECHO MEAS - PULM DIAS VEL: 55.2 CM/SEC
BH CV ECHO MEAS - PULM S/D: 1.29
BH CV ECHO MEAS - PULM SYS VEL: 71.5 CM/SEC
BH CV ECHO MEAS - QP/QS: 0.61
BH CV ECHO MEAS - RAP SYSTOLE: 3 MMHG
BH CV ECHO MEAS - RV MAX PG: 2.6 MMHG
BH CV ECHO MEAS - RV V1 MAX: 80.2 CM/SEC
BH CV ECHO MEAS - RV V1 VTI: 17.7 CM
BH CV ECHO MEAS - RVOT DIAM: 2.07 CM
BH CV ECHO MEAS - RVSP: 35.9 MMHG
BH CV ECHO MEAS - SV(LVOT): 98.5 ML
BH CV ECHO MEAS - SV(MOD-SP2): 73 ML
BH CV ECHO MEAS - SV(MOD-SP4): 79 ML
BH CV ECHO MEAS - SV(RVOT): 59.6 ML
BH CV ECHO MEAS - SVI(LVOT): 34.4 ML/M2
BH CV ECHO MEAS - SVI(MOD-SP2): 25.5 ML/M2
BH CV ECHO MEAS - SVI(MOD-SP4): 27.6 ML/M2
BH CV ECHO MEAS - TAPSE (>1.6): 2.5 CM
BH CV ECHO MEAS - TR MAX PG: 32.9 MMHG
BH CV ECHO MEAS - TR MAX VEL: 287 CM/SEC
BH CV ECHO MEASUREMENTS AVERAGE E/E' RATIO: 7.19
BH CV XLRA - RV BASE: 3.5 CM
BH CV XLRA - RV LENGTH: 10.2 CM
BH CV XLRA - RV MID: 2.9 CM
BH CV XLRA - TDI S': 13.8 CM/SEC
LEFT ATRIUM VOLUME INDEX: 28 ML/M2
LV EF BIPLANE MOD: 54.5 %
SINUS: 3.1 CM
STJ: 3 CM

## 2025-03-21 PROCEDURE — 25510000001 PERFLUTREN 6.52 MG/ML SUSPENSION 2 ML VIAL: Performed by: INTERNAL MEDICINE

## 2025-03-21 PROCEDURE — 93306 TTE W/DOPPLER COMPLETE: CPT

## 2025-03-21 PROCEDURE — 76705 ECHO EXAM OF ABDOMEN: CPT

## 2025-03-21 RX ORDER — OMEPRAZOLE 40 MG/1
40 CAPSULE, DELAYED RELEASE ORAL DAILY
COMMUNITY

## 2025-03-21 RX ADMIN — PERFLUTREN 2 ML: 6.52 INJECTION, SUSPENSION INTRAVENOUS at 09:04

## 2025-03-25 ENCOUNTER — RESULTS FOLLOW-UP (OUTPATIENT)
Dept: CARDIOLOGY | Facility: HOSPITAL | Age: 40
End: 2025-03-25
Payer: COMMERCIAL

## 2025-03-25 NOTE — TELEPHONE ENCOUNTER
Please let him know that echo overall looks good.  His heart is strong and functioning well.  There is a mild amount of thickening of the heart muscle which is slightly worse than last year but overall stable.  Aortic valve has a small amount of leakage but no narrowing.  Pressures in the lungs are very slightly elevated (high normal is 35 mmHg and his was 35.9 mmHg).  Would make sure he is checking blood pressures at home and call if consistently greater than 130/80.  Keep scheduled follow-up with Dr. Figueroa in September.

## 2025-04-16 RX ORDER — OMEPRAZOLE 40 MG/1
40 CAPSULE, DELAYED RELEASE ORAL DAILY
OUTPATIENT
Start: 2025-04-16

## 2025-04-16 RX ORDER — LOSARTAN POTASSIUM 100 MG/1
100 TABLET ORAL DAILY
Qty: 30 TABLET | Refills: 0 | OUTPATIENT
Start: 2025-04-16

## 2025-04-16 NOTE — TELEPHONE ENCOUNTER
"      Caller: Fazal Tavarez \"Kwame\"    Relationship: Self    Best call back number: [phone]     Requested Prescriptions:   Requested Prescriptions     Pending Prescriptions Disp Refills    losartan (COZAAR) 100 MG tablet 30 tablet 0     Sig: Take 1 tablet by mouth Daily.    omeprazole (priLOSEC) 40 MG capsule       Sig: Take 1 capsule by mouth Daily.        Pharmacy where request should be sent: [facility] [place] [id number] [facility] [phone] [facility] [phone] [facility]     Last office visit with prescribing clinician: 5/13/2024   Last telemedicine visit with prescribing clinician: Visit date not found   Next office visit with prescribing clinician: Visit date not found     Additional details provided by patient: PATIENT IS REQUESTING 40MG TABLETS FOR THE OMEPROZAOLE.    Does the patient have less than a 3 day supply:  [x] Yes  [] No    Would you like a call back once the refill request has been completed: [] Yes [] No    If the office needs to give you a call back, can they leave a voicemail: [] Yes [] No    Phong Grayson Rep   04/16/25 12:35 EDT          "

## 2025-04-17 DIAGNOSIS — I10 ESSENTIAL HYPERTENSION: ICD-10-CM

## 2025-04-17 DIAGNOSIS — K21.9 GASTROESOPHAGEAL REFLUX DISEASE WITHOUT ESOPHAGITIS: Primary | ICD-10-CM

## 2025-04-17 RX ORDER — OMEPRAZOLE 40 MG/1
40 CAPSULE, DELAYED RELEASE ORAL DAILY
Qty: 14 CAPSULE | Refills: 0 | Status: SHIPPED | OUTPATIENT
Start: 2025-04-17

## 2025-04-17 RX ORDER — LOSARTAN POTASSIUM 100 MG/1
100 TABLET ORAL DAILY
Qty: 30 TABLET | Refills: 0 | OUTPATIENT
Start: 2025-04-17

## 2025-04-17 RX ORDER — LOSARTAN POTASSIUM 100 MG/1
100 TABLET ORAL DAILY
Qty: 14 TABLET | Refills: 0 | Status: SHIPPED | OUTPATIENT
Start: 2025-04-17

## 2025-04-17 NOTE — TELEPHONE ENCOUNTER
Rx Refill Note  Requested Prescriptions     Pending Prescriptions Disp Refills    losartan (COZAAR) 100 MG tablet [Pharmacy Med Name: losartan 100 mg tablet] 30 tablet 0     Sig: TAKE ONE TABLET BY MOUTH EVERY DAY      DUPLICATE  Last office visit with prescribing clinician: 8/26/2024   Last telemedicine visit with prescribing clinician: Visit date not found   Next office visit with prescribing clinician: Visit date not found         JAIME Murray(R)  04/17/25, 11:01 EDT

## 2025-04-17 NOTE — TELEPHONE ENCOUNTER
Rx Refill Note  Requested Prescriptions      No prescriptions requested or ordered in this encounter      Last office visit with prescribing clinician: 5/13/2024   Last telemedicine visit with prescribing clinician: Visit date not found   Next office visit with prescribing clinician: 4/24/2025                         Would you like a call back once the refill request has been completed: [] Yes [] No    If the office needs to give you a call back, can they leave a voicemail: [] Yes [] No    Xiao Cortez MA  04/17/25, 09:45 EDT

## 2025-04-17 NOTE — PROGRESS NOTES
SURGERY  Fazal Tavarez   1985 04/21/25    Chief Complaint: Gallbladder polyp    HPI    Mr. Tavarez is a very nice 39 y.o. male who comes in with history of elevated liver enzymes that led to US liver in 2021 with no polyps but with fatty liver.  Ultrasound 3/19/24 with 4 mm polyp.  US 3/21/2025 shows a 6 mm gallbladder polyp that appears minimally larger than study on 3/19/2024, no gallstones, but with presence of fatty infiltration.      Ultrasound elastography shows a steatosis score of 1, 16% derived fat fraction.    He has indigestion but no right upper quadrant pain or nausea after eating.    Past Medical History:   Diagnosis Date    Anemia     COVID-19 12/01/2021    External hemorrhoid     GERD (gastroesophageal reflux disease)     Hyperlipidemia     Hypertension     Perirectal abscess     Rectal bleeding     Seasonal allergies      Past Surgical History:   Procedure Laterality Date    COLONOSCOPY W/ POLYPECTOMY N/A 3/15/2017    Procedure: COLONOSCOPY TO CECUM AND TERMINAL ILEUM;  Surgeon: Pratik Kaur MD;  Location: [facility] ENDOSCOPY;  Service:     ENDOSCOPY N/A 3/15/2017    Procedure: ESOPHAGOGASTRODUODENOSCOPY WITH BIOPSIES;  Surgeon: Pratik Kaur MD;  Location: [facility] ENDOSCOPY;  Service:     INCISION AND DRAINAGE PERIRECTAL ABSCESS N/A 4/4/2018    Procedure: INCISION AND DRAINAGE OF PERIRECTAL ABSCESS;  Surgeon: Denise White MD;  Location: [facility] MAIN OR;  Service: General    WISDOM TOOTH EXTRACTION       Family History   Problem Relation Age of Onset    Diabetes Father     Diabetes Paternal Uncle      Social History     Socioeconomic History    Marital status: Single   Tobacco Use    Smoking status: Former     Current packs/day: 1.50     Average packs/day: 1.5 packs/day for 10.0 years (15.0 ttl pk-yrs)     Types: Cigarettes, Electronic Cigarette    Smokeless tobacco: Never    Tobacco comments:     quit e cigarettes in 2019.    Vaping Use    Vaping status: Former     "Substances: Nicotine    Devices: Disposable   Substance and Sexual Activity    Alcohol use: Yes     Alcohol/week: 2.0 standard drinks of alcohol     Types: 2 Cans of beer per week    Drug use: Yes     Types: Marijuana    Sexual activity: Defer         Current Outpatient Medications:     albuterol sulfate  (90 Base) MCG/ACT inhaler, Inhale 2 puffs Every 4 (Four) Hours As Needed for Wheezing., Disp: , Rfl:     cetirizine (zyrTEC) 10 MG tablet, Take 1 tablet by mouth Daily., Disp: , Rfl:     Flonase Sensimist 27.5 MCG/SPRAY nasal spray, 1 spray 2 (Two) Times a Day., Disp: , Rfl:     ketotifen (ZADITOR) 0.025 % ophthalmic solution, Administer 1 drop to both eyes 2 (Two) Times a Day., Disp: , Rfl:     losartan (COZAAR) 100 MG tablet, Take 1 tablet by mouth Daily., Disp: 14 tablet, Rfl: 0    nadolol (CORGARD) 20 MG tablet, 1 tablet., Disp: , Rfl:     omeprazole (priLOSEC) 40 MG capsule, Take 1 capsule by mouth Daily., Disp: 14 capsule, Rfl: 0    Symbicort 80-4.5 MCG/ACT inhaler, USE 2 INHALATIONS BY MOUTH TWICE DAILY, Disp: , Rfl:     Allergies   Allergen Reactions    Lisinopril Cough       PHYSICAL EXAM:  /85   Pulse 79   Ht 188 cm (74\")   Wt (!) 171 kg (378 lb)   SpO2 94%   BMI 48.53 kg/m²   Body mass index is 48.53 kg/m².  Constitutional: well developed, morbidly obese, appears healthy, stated age  ENMT: Hearing intact, neck without masses  CVS: RRR, no murmur  Respiratory: CTA, normal respiratory effort   Gastrointestinal: abdomen soft, nontender, abdominal hernia not detected  Musculoskeletal: gait normal, muscle mass normal  Neurological: awake and alert, seems to have reasonable capacity for understanding for medical decision making  Psychiatric: appears to have reasonable judgement, pleasant    Radiographic/Lab Findings:   AST 69, ALT 83 in 2021  Reviewed: risk of Gallbladder polyp causing a problem is low, risk of surgery is low as well.  Increasing size of the polyp is a little bit bothersome. "     IMPRESSION:  6 mm gallbladder polyp, enlarging (not seen 2021, 4 mm 2024, 6 mm 2026)  History of elevated ALT mildly at 47  Morbid obesity  Mother and father S/P cholecystectomy  JCPS  with fishing trip this week    PLAN:  Lap chol with xray and liver biopsy.  Don't feel strongly about this and if he decides to forego that and just get another US one year from now, i will be agreeable.     Denise White MD      In order to provide a more personal and interactive patient experience as well as improve efficiency, this note was started prior to the office visit, including review of past history and pertinent images, surgeries.

## 2025-04-21 ENCOUNTER — PREP FOR SURGERY (OUTPATIENT)
Dept: OTHER | Facility: HOSPITAL | Age: 40
End: 2025-04-21
Payer: COMMERCIAL

## 2025-04-21 ENCOUNTER — OFFICE VISIT (OUTPATIENT)
Dept: SURGERY | Facility: CLINIC | Age: 40
End: 2025-04-21
Payer: COMMERCIAL

## 2025-04-21 VITALS
SYSTOLIC BLOOD PRESSURE: 130 MMHG | HEART RATE: 79 BPM | BODY MASS INDEX: 40.43 KG/M2 | WEIGHT: 315 LBS | DIASTOLIC BLOOD PRESSURE: 85 MMHG | HEIGHT: 74 IN | OXYGEN SATURATION: 94 %

## 2025-04-21 DIAGNOSIS — K82.4 GALLBLADDER POLYP: Primary | ICD-10-CM

## 2025-04-21 PROCEDURE — 99203 OFFICE O/P NEW LOW 30 MIN: CPT | Performed by: SURGERY

## 2025-04-21 RX ORDER — SODIUM CHLORIDE 9 MG/ML
40 INJECTION, SOLUTION INTRAVENOUS AS NEEDED
OUTPATIENT
Start: 2025-04-21

## 2025-04-21 RX ORDER — ONDANSETRON 2 MG/ML
4 INJECTION INTRAMUSCULAR; INTRAVENOUS EVERY 6 HOURS PRN
OUTPATIENT
Start: 2025-04-21

## 2025-04-21 RX ORDER — ACETAMINOPHEN 500 MG
1000 TABLET ORAL ONCE
OUTPATIENT
Start: 2025-04-21 | End: 2025-04-21

## 2025-04-21 RX ORDER — SODIUM CHLORIDE 0.9 % (FLUSH) 0.9 %
10 SYRINGE (ML) INJECTION AS NEEDED
OUTPATIENT
Start: 2025-04-21

## 2025-04-21 RX ORDER — SODIUM CHLORIDE 0.9 % (FLUSH) 0.9 %
10 SYRINGE (ML) INJECTION EVERY 12 HOURS SCHEDULED
OUTPATIENT
Start: 2025-04-21

## 2025-04-21 RX ORDER — OXYCODONE HCL 10 MG/1
10 TABLET, FILM COATED, EXTENDED RELEASE ORAL ONCE
Refills: 0 | OUTPATIENT
Start: 2025-04-21 | End: 2025-04-21

## 2025-04-24 ENCOUNTER — OFFICE VISIT (OUTPATIENT)
Dept: FAMILY MEDICINE CLINIC | Facility: CLINIC | Age: 40
End: 2025-04-24
Payer: COMMERCIAL

## 2025-04-24 VITALS
TEMPERATURE: 98.5 F | OXYGEN SATURATION: 95 % | HEIGHT: 74 IN | HEART RATE: 62 BPM | SYSTOLIC BLOOD PRESSURE: 130 MMHG | BODY MASS INDEX: 40.43 KG/M2 | DIASTOLIC BLOOD PRESSURE: 82 MMHG | WEIGHT: 315 LBS

## 2025-04-24 DIAGNOSIS — K21.9 GASTROESOPHAGEAL REFLUX DISEASE WITHOUT ESOPHAGITIS: ICD-10-CM

## 2025-04-24 DIAGNOSIS — I10 ESSENTIAL HYPERTENSION: ICD-10-CM

## 2025-04-24 DIAGNOSIS — Z12.5 SCREENING FOR PROSTATE CANCER: ICD-10-CM

## 2025-04-24 PROCEDURE — 99214 OFFICE O/P EST MOD 30 MIN: CPT | Performed by: NURSE PRACTITIONER

## 2025-04-24 RX ORDER — OMEPRAZOLE 40 MG/1
40 CAPSULE, DELAYED RELEASE ORAL DAILY
Qty: 90 CAPSULE | Refills: 3 | Status: SHIPPED | OUTPATIENT
Start: 2025-04-24

## 2025-04-24 RX ORDER — LOSARTAN POTASSIUM 100 MG/1
100 TABLET ORAL DAILY
Qty: 90 TABLET | Refills: 3 | Status: SHIPPED | OUTPATIENT
Start: 2025-04-24

## 2025-04-24 NOTE — PROGRESS NOTES
"Subjective   Fazal Tavarez is a 39 y.o. male.     Hypertension  Pertinent negatives include no chest pain, palpitations or shortness of breath.       Since the last visit, he has overall felt well.  He has Primary Hypertension and well controlled on current medication and GERD controlled on PPI Rx.  he has been compliant with current medications have reviewed them.  The patient denies medication side effects.  Will refill medications. /82 (BP Location: Right arm, Patient Position: Sitting, Cuff Size: Large Adult)   Pulse 62   Temp 98.5 °F (36.9 °C) (Oral)   Ht 188 cm (74\")   Wt (!) 171 kg (376 lb 6.4 oz)   SpO2 95%   BMI 48.33 kg/m² .          Results for orders placed or performed during the hospital encounter of 03/21/25   Adult Transthoracic Echo Complete W/ Cont if Necessary Per Protocol    Collection Time: 03/21/25  9:04 AM   Result Value Ref Range    EF(MOD-bp) 54.5 %    LVIDd 5.9 cm    LVIDs 3.6 cm    IVSd 1.10 cm    LVPWd 1.20 cm    FS 39.2 %    IVS/LVPW 0.92 cm    ESV(cubed) 46.2 ml    LV Sys Vol (BSA corrected) 23.4 cm2    EDV(cubed) 205.4 ml    LV Ya Vol (BSA corrected) 51.0 cm2    LV mass(C)d 288.5 grams    LVOT area 3.7 cm2    LVOT diam 2.18 cm    EDV(MOD-sp2) 133.0 ml    EDV(MOD-sp4) 146.0 ml    ESV(MOD-sp2) 60.0 ml    ESV(MOD-sp4) 67.0 ml    SV(MOD-sp2) 73.0 ml    SV(MOD-sp4) 79.0 ml    SVi(MOD-SP2) 25.5 ml/m2    SVi(MOD-SP4) 27.6 ml/m2    SVi (LVOT) 34.4 ml/m2    EF(MOD-sp2) 54.9 %    EF(MOD-sp4) 54.1 %    MV E max kelvin 85.6 cm/sec    MV A max kelvin 82.7 cm/sec    MV dec time 0.25 sec    MV E/A 1.04     Pulm A Revs Dur 0.11 sec    MV A dur 0.12 sec    LA ESV Index (BP) 28.0 ml/m2    Med Peak E' Kelvin 11.2 cm/sec    Lat Peak E' Kelvin 12.6 cm/sec    TR max kelvin 287.0 cm/sec    Avg E/e' ratio 7.19     SV(LVOT) 98.5 ml    SV(RVOT) 59.6 ml    Qp/Qs 0.61     RV Base 3.5 cm    RV Mid 2.9 cm    RV Length 10.2 cm    TAPSE (>1.6) 2.5 cm    RV S' 13.8 cm/sec    Pulm Sys Kelvin 71.5 cm/sec    Pulm Ya Kelvin " 55.2 cm/sec    Pulm S/D 1.29     Pulm A Revs Kelvin 33.0 cm/sec    LV V1 max 122.0 cm/sec    LV V1 max PG 6.0 mmHg    LV V1 mean PG 3.0 mmHg    LV V1 VTI 26.5 cm    Ao pk kelvin 135.0 cm/sec    Ao max PG 7.3 mmHg    Ao mean PG 4.0 mmHg    Ao V2 VTI 31.8 cm    NATE(I,D) 3.1 cm2    Dimensionless Index 0.83 (DI)    MV max PG 3.8 mmHg    MV mean PG 2.38 mmHg    MV V2 VTI 34.9 cm    MV P1/2t 84.0 msec    MVA(P1/2t) 2.6 cm2    MVA(VTI) 2.8 cm2    MV dec slope 335.7 cm/sec2    TR max PG 32.9 mmHg    RVSP(TR) 35.9 mmHg    RAP systole 3.0 mmHg    RVOT diam 2.07 cm    RV V1 max PG 2.6 mmHg    RV V1 max 80.2 cm/sec    RV V1 VTI 17.7 cm    PA V2 max 108.4 cm/sec    PA acc time 0.08 sec    Ao root diam 3.6 cm    ACS 2.5 cm    Sinus 3.1 cm    STJ 3.0 cm    Ao root area (BSA corrected) 1.3 cm2    Ascending aorta 3.4 cm    Aortic arch 3.0 cm         The following portions of the patient's history were reviewed and updated as appropriate: allergies, current medications, past family history, past medical history, past social history, past surgical history, and problem list.    Review of Systems   Constitutional:  Negative for fatigue.   Respiratory:  Negative for cough and shortness of breath.    Cardiovascular:  Negative for chest pain and palpitations.   Skin:  Negative for rash.   Psychiatric/Behavioral:  Negative for dysphoric mood and sleep disturbance. The patient is not nervous/anxious.        Objective   Physical Exam  Vitals and nursing note reviewed.   Constitutional:       Appearance: He is well-developed.   Neck:      Vascular: No carotid bruit.   Cardiovascular:      Rate and Rhythm: Normal rate and regular rhythm.   Pulmonary:      Effort: Pulmonary effort is normal.      Breath sounds: Normal breath sounds.   Neurological:      Mental Status: He is alert and oriented to person, place, and time.   Psychiatric:         Mood and Affect: Mood normal.         Behavior: Behavior normal.         Thought Content: Thought content  normal.         Judgment: Judgment normal.         Assessment & Plan   Diagnoses and all orders for this visit:    1. Essential hypertension  -     Comprehensive metabolic panel  -     Lipid panel  -     CBC and Differential  -     TSH  -     PSA Screen  -     Hemoglobin A1c  -     losartan (COZAAR) 100 MG tablet; Take 1 tablet by mouth Daily.  Dispense: 90 tablet; Refill: 3    2. Gastroesophageal reflux disease without esophagitis  -     Comprehensive metabolic panel  -     Lipid panel  -     CBC and Differential  -     TSH  -     PSA Screen  -     Hemoglobin A1c  -     omeprazole (priLOSEC) 40 MG capsule; Take 1 capsule by mouth Daily.  Dispense: 90 capsule; Refill: 3    3. Screening for prostate cancer  -     PSA Screen

## 2025-04-25 LAB
ALBUMIN SERPL-MCNC: 4.5 G/DL (ref 4.1–5.1)
ALP SERPL-CCNC: 96 IU/L (ref 44–121)
ALT SERPL-CCNC: 51 IU/L (ref 0–44)
AST SERPL-CCNC: 57 IU/L (ref 0–40)
BASOPHILS # BLD AUTO: 0.1 X10E3/UL (ref 0–0.2)
BASOPHILS NFR BLD AUTO: 1 %
BILIRUB SERPL-MCNC: 0.5 MG/DL (ref 0–1.2)
BUN SERPL-MCNC: 13 MG/DL (ref 6–24)
BUN/CREAT SERPL: 15 (ref 9–20)
CALCIUM SERPL-MCNC: 9.9 MG/DL (ref 8.7–10.2)
CHLORIDE SERPL-SCNC: 101 MMOL/L (ref 96–106)
CHOLEST SERPL-MCNC: 215 MG/DL (ref 100–199)
CO2 SERPL-SCNC: 21 MMOL/L (ref 20–29)
CREAT SERPL-MCNC: 0.88 MG/DL (ref 0.76–1.27)
EGFRCR SERPLBLD CKD-EPI 2021: 111 ML/MIN/1.73
EOSINOPHIL # BLD AUTO: 0.3 X10E3/UL (ref 0–0.4)
EOSINOPHIL NFR BLD AUTO: 4 %
ERYTHROCYTE [DISTWIDTH] IN BLOOD BY AUTOMATED COUNT: 14 % (ref 11.6–15.4)
GLOBULIN SER CALC-MCNC: 2.7 G/DL (ref 1.5–4.5)
GLUCOSE SERPL-MCNC: 84 MG/DL (ref 70–99)
HBA1C MFR BLD: 6 % (ref 4.8–5.6)
HCT VFR BLD AUTO: 41.7 % (ref 37.5–51)
HDLC SERPL-MCNC: 44 MG/DL
HGB BLD-MCNC: 13.3 G/DL (ref 13–17.7)
IMM GRANULOCYTES # BLD AUTO: 0 X10E3/UL (ref 0–0.1)
IMM GRANULOCYTES NFR BLD AUTO: 0 %
LDLC SERPL CALC-MCNC: 133 MG/DL (ref 0–99)
LYMPHOCYTES # BLD AUTO: 2.8 X10E3/UL (ref 0.7–3.1)
LYMPHOCYTES NFR BLD AUTO: 37 %
MCH RBC QN AUTO: 27.6 PG (ref 26.6–33)
MCHC RBC AUTO-ENTMCNC: 31.9 G/DL (ref 31.5–35.7)
MCV RBC AUTO: 87 FL (ref 79–97)
MONOCYTES # BLD AUTO: 0.5 X10E3/UL (ref 0.1–0.9)
MONOCYTES NFR BLD AUTO: 6 %
NEUTROPHILS # BLD AUTO: 3.9 X10E3/UL (ref 1.4–7)
NEUTROPHILS NFR BLD AUTO: 52 %
PLATELET # BLD AUTO: 209 X10E3/UL (ref 150–450)
POTASSIUM SERPL-SCNC: 4.8 MMOL/L (ref 3.5–5.2)
PROT SERPL-MCNC: 7.2 G/DL (ref 6–8.5)
PSA SERPL-MCNC: 0.3 NG/ML (ref 0–4)
RBC # BLD AUTO: 4.82 X10E6/UL (ref 4.14–5.8)
SODIUM SERPL-SCNC: 140 MMOL/L (ref 134–144)
TRIGL SERPL-MCNC: 214 MG/DL (ref 0–149)
TSH SERPL DL<=0.005 MIU/L-ACNC: 1.72 UIU/ML (ref 0.45–4.5)
VLDLC SERPL CALC-MCNC: 38 MG/DL (ref 5–40)
WBC # BLD AUTO: 7.6 X10E3/UL (ref 3.4–10.8)

## 2025-05-12 ENCOUNTER — OFFICE VISIT (OUTPATIENT)
Dept: FAMILY MEDICINE CLINIC | Facility: CLINIC | Age: 40
End: 2025-05-12
Payer: COMMERCIAL

## 2025-05-12 VITALS
WEIGHT: 315 LBS | TEMPERATURE: 98.8 F | SYSTOLIC BLOOD PRESSURE: 132 MMHG | DIASTOLIC BLOOD PRESSURE: 86 MMHG | HEIGHT: 74 IN | OXYGEN SATURATION: 93 % | BODY MASS INDEX: 40.43 KG/M2 | HEART RATE: 71 BPM

## 2025-05-12 DIAGNOSIS — J45.20 MILD INTERMITTENT ASTHMA WITHOUT COMPLICATION: Primary | ICD-10-CM

## 2025-05-12 DIAGNOSIS — J22 LOWER RESPIRATORY TRACT INFECTION: ICD-10-CM

## 2025-05-12 PROCEDURE — 99213 OFFICE O/P EST LOW 20 MIN: CPT | Performed by: NURSE PRACTITIONER

## 2025-05-12 RX ORDER — OLOPATADINE HYDROCHLORIDE 665 UG/1
2 SPRAY NASAL DAILY
COMMUNITY
Start: 2025-05-08

## 2025-05-12 RX ORDER — PREDNISONE 20 MG/1
20 TABLET ORAL DAILY
Qty: 5 TABLET | Refills: 0 | Status: SHIPPED | OUTPATIENT
Start: 2025-05-12

## 2025-05-12 RX ORDER — ALBUTEROL SULFATE 1.25 MG/3ML
1 SOLUTION RESPIRATORY (INHALATION) EVERY 6 HOURS PRN
Qty: 120 ML | Refills: 11 | Status: SHIPPED | OUTPATIENT
Start: 2025-05-12

## 2025-05-12 RX ORDER — AZITHROMYCIN 250 MG/1
TABLET, FILM COATED ORAL
Qty: 6 TABLET | Refills: 0 | Status: SHIPPED | OUTPATIENT
Start: 2025-05-12

## 2025-05-12 NOTE — PROGRESS NOTES
Subjective   Fazal Tavarez is a 39 y.o. male.     History of Present Illness   Chief Complaint     Cough (-Started Friday)  Wheezing  Headache  Shortness of Breath  Has history of asthma per his allergist and keeps an albuterol inhaler that he uses occasionally.  He has been needing it more since Friday.  He does not have much congestion or sinus pressure.  Denies significant post nasal drainage.  Cough is not really productive though he feels that there is stuff in his chest to cough out.    The following portions of the patient's history were reviewed and updated as appropriate: allergies, current medications, past family history, past medical history, past social history, past surgical history, and problem list.    Review of Systems   Constitutional:  Positive for fatigue.   HENT:  Negative for congestion, postnasal drip, sinus pressure and sinus pain.    Respiratory:  Positive for cough, chest tightness, shortness of breath and wheezing.    Cardiovascular:  Negative for chest pain and palpitations.   Skin:  Negative for rash.   Psychiatric/Behavioral:  Negative for dysphoric mood and sleep disturbance. The patient is not nervous/anxious.        Objective   Physical Exam  Vitals and nursing note reviewed.   Constitutional:       Appearance: He is well-developed.   HENT:      Right Ear: Tympanic membrane, ear canal and external ear normal.      Left Ear: Tympanic membrane, ear canal and external ear normal.      Nose: No mucosal edema.      Mouth/Throat:      Lips: Pink.      Mouth: Mucous membranes are moist.      Pharynx: Oropharynx is clear.   Cardiovascular:      Rate and Rhythm: Normal rate and regular rhythm.   Pulmonary:      Effort: Pulmonary effort is normal.      Breath sounds: Examination of the right-lower field reveals rales. Examination of the left-lower field reveals rales. Wheezing and rales present.   Neurological:      Mental Status: He is alert and oriented to person, place, and time.    Psychiatric:         Mood and Affect: Mood normal.         Behavior: Behavior normal.         Thought Content: Thought content normal.         Judgment: Judgment normal.         Assessment & Plan   Diagnoses and all orders for this visit:    1. Mild intermittent asthma without complication (Primary)  -     albuterol (ACCUNEB) 1.25 MG/3ML nebulizer solution; Take 3 mL by nebulization Every 6 (Six) Hours As Needed for Shortness of Air.  Dispense: 120 mL; Refill: 11    2. Lower respiratory tract infection  -     azithromycin (Zithromax Z-Martin) 250 MG tablet; Take 2 tablets by mouth on day 1, then 1 tablet daily on days 2-5  Dispense: 6 tablet; Refill: 0  -     predniSONE (DELTASONE) 20 MG tablet; Take 1 tablet by mouth Daily.  Dispense: 5 tablet; Refill: 0          Patient given nebulizer supplied by office.  Nebulizer solution sent to pharmacy.

## 2025-05-13 ENCOUNTER — PRE-ADMISSION TESTING (OUTPATIENT)
Dept: PREADMISSION TESTING | Facility: HOSPITAL | Age: 40
End: 2025-05-13
Payer: COMMERCIAL

## 2025-05-13 ENCOUNTER — HOSPITAL ENCOUNTER (OUTPATIENT)
Dept: GENERAL RADIOLOGY | Facility: HOSPITAL | Age: 40
Discharge: HOME | End: 2025-05-13
Payer: COMMERCIAL

## 2025-05-13 VITALS
HEART RATE: 82 BPM | DIASTOLIC BLOOD PRESSURE: 83 MMHG | BODY MASS INDEX: 42.66 KG/M2 | SYSTOLIC BLOOD PRESSURE: 146 MMHG | OXYGEN SATURATION: 95 % | WEIGHT: 315 LBS | TEMPERATURE: 98.6 F | RESPIRATION RATE: 20 BRPM | HEIGHT: 72 IN

## 2025-05-13 LAB
ANION GAP SERPL CALCULATED.3IONS-SCNC: 10.2 MMOL/L (ref 5–15)
BUN SERPL-MCNC: 12 MG/DL (ref 6–20)
BUN/CREAT SERPL: 16 (ref 7–25)
CALCIUM SPEC-SCNC: 9.6 MG/DL (ref 8.6–10.5)
CHLORIDE SERPL-SCNC: 105 MMOL/L (ref 98–107)
CO2 SERPL-SCNC: 23.8 MMOL/L (ref 22–29)
CREAT SERPL-MCNC: 0.75 MG/DL (ref 0.76–1.27)
DEPRECATED RDW RBC AUTO: 42.4 FL (ref 37–54)
EGFRCR SERPLBLD CKD-EPI 2021: 117 ML/MIN/1.73
ERYTHROCYTE [DISTWIDTH] IN BLOOD BY AUTOMATED COUNT: 14 % (ref 12.3–15.4)
GLUCOSE SERPL-MCNC: 136 MG/DL (ref 65–99)
HCT VFR BLD AUTO: 37.1 % (ref 37.5–51)
HGB BLD-MCNC: 12.1 G/DL (ref 13–17.7)
MCH RBC QN AUTO: 27.3 PG (ref 26.6–33)
MCHC RBC AUTO-ENTMCNC: 32.6 G/DL (ref 31.5–35.7)
MCV RBC AUTO: 83.7 FL (ref 79–97)
PLATELET # BLD AUTO: 186 10*3/MM3 (ref 140–450)
PMV BLD AUTO: 11.2 FL (ref 6–12)
POTASSIUM SERPL-SCNC: 4.1 MMOL/L (ref 3.5–5.2)
RBC # BLD AUTO: 4.43 10*6/MM3 (ref 4.14–5.8)
SODIUM SERPL-SCNC: 139 MMOL/L (ref 136–145)
WBC NRBC COR # BLD AUTO: 5.46 10*3/MM3 (ref 3.4–10.8)

## 2025-05-13 PROCEDURE — 36415 COLL VENOUS BLD VENIPUNCTURE: CPT

## 2025-05-13 PROCEDURE — 85027 COMPLETE CBC AUTOMATED: CPT

## 2025-05-13 PROCEDURE — 71045 X-RAY EXAM CHEST 1 VIEW: CPT

## 2025-05-13 PROCEDURE — 80048 BASIC METABOLIC PNL TOTAL CA: CPT

## 2025-05-13 NOTE — DISCHARGE INSTRUCTIONS
Take the following medications the morning of surgery: NADOLOL, OMEPRAZOLE, INHALERS      If you are on prescription narcotic pain medication to control your pain you may also take that medication the morning of surgery.      General Instructions:     Do not eat solid food after midnight the night before surgery.  Clear liquids day of surgery are allowed but must be stopped at least two hours before your hospital arrival time.       Allowed clear liquids      Water, sodas, and tea or coffee with no cream or milk added.       12 to 20 ounces of a clear liquid that contains carbohydrates is recommended.  If non-diabetic, have Gatorade or Powerade.  If diabetic, have G2 or Powerade Zero.     Do not have liquids red in color.  Do not consume chicken, beef, pork or vegetable broth or bouillon cubes of any variety as they are not considered clear liquids and are not allowed.      Infants may have breast milk up to four hours before surgery.  Infants drinking formula may drink formula up to six hours before surgery.   Patients who avoid smoking, chewing tobacco and alcohol for 4 weeks prior to surgery have a reduced risk of post-operative complications.  Quit smoking as many days before surgery as you can.  Do not smoke, use chewing tobacco or drink alcohol the day of surgery.   If applicable bring your C-PAP/ BI-PAP machine in with you to preop day of surgery.  Bring any papers given to you in the doctor’s office.  Wear clean comfortable clothes.  Do not wear contact lenses, false eyelashes or make-up.  Bring a case for your glasses.   Bring crutches or walker if applicable.  Remove all piercings.  Leave jewelry and any other valuables at home.  Hair extensions with metal clips must be removed prior to surgery.  The Pre-Admission Testing nurse will instruct you to bring medications if unable to obtain an accurate list in Pre-Admission Testing.    Day of surgery you will need to let the preoperative nurse know the last  time you took each of your medications.  To ensure a safe environment for patients and staff, we kindly ask that children under the age of 16 not accompany patients.  If you must bring a dependent child or dependent adult please ensure a responsible adult, other than yourself, is present to supervise them.      If you were given a blood bank ID arm band remember to bring it with you the day of surgery.    Preventing a Surgical Site Infection:  For 2 to 3 days before surgery, avoid shaving with a razor because the razor can irritate skin and make it easier to develop an infection.    Any areas of open skin can increase the risk of a post-operative wound infection by allowing bacteria to enter and travel throughout the body.  Notify your surgeon if you have any skin wounds / rashes even if it is not near the expected surgical site.  The area will need assessed to determine if surgery should be delayed until it is healed.  The night prior to surgery shower using a fresh bar of anti-bacterial soap (such as Dial) and clean washcloth.  Sleep in a clean bed with clean clothing.  Do not allow pets to sleep with you.  Shower on the morning of surgery using a fresh bar of anti-bacterial soap (such as Dial) and clean washcloth.  Dry with a clean towel and dress in clean clothing.  Ask your surgeon if you will be receiving antibiotics prior to surgery.  CHLORHEXIDINE CLOTH INSTRUCTIONS  The morning of surgery follow these instructions using the Chlorhexidine cloths you've been given.  These steps reduce bacteria on the body.  Do not use the cloths near your eyes, ears mouth, genitalia or on open wounds.  Throw the cloths away after use but do not try to flush them down a toilet.      Open and remove one cloth at a time from the package.    Leave the cloth unfolded and begin the bathing.  Massage the skin with the cloths using gentle pressure to remove bacteria.  Do not scrub harshly.   Follow the steps below with one 2% CHG  cloth per area (6 total cloths).  One cloth for neck, shoulders and chest.  One cloth for both arms, hands, fingers and underarms (do underarms last).  One cloth for the abdomen followed by groin.  One cloth for right leg and foot including between the toes.  One cloth for left leg and foot including between the toes.  The last cloth is to be used for the back of the neck, back and buttocks.    Allow the CHG to air dry 3 minutes on the skin which will give it time to work and decrease the chance of irritation.  The skin may feel sticky until it is dry.  Do not rinse with water or any other liquid or you will lose the beneficial effects of the CHG.  If mild skin irritation occurs, do rinse the skin to remove the CHG.  Report this to the nurse at time of admission.  Do not apply lotions, creams, ointments, deodorants or perfumes after using the clothes. Dress in clean clothes before coming to the hospital.Make sure you, your family, and all healthcare providers clean their hands with soap and water or an alcohol based hand  before caring for you or your wound.    Day of surgery:  Your arrival time is approximately two hours before your scheduled surgery time.  Please note if you have an early arrival time the surgery doors do not open before 5:00 AM.  Upon arrival, a Pre-op nurse and Anesthesiologist will review your health history, obtain vital signs, and answer questions you may have.  The only belongings needed at this time will be a list of your home medications and if applicable your C-PAP/BI-PAP machine.  A Pre-op nurse will start an IV and you may receive medication in preparation for surgery, including something to help you relax.     Please be aware that surgery does come with discomfort.  We want to make every effort to control your discomfort so please discuss any uncontrolled symptoms with your nurse.   Your doctor will most likely have prescribed pain medications.      If you are going home after  surgery you will receive individualized written care instructions before being discharged.  A responsible adult must drive you to and from the hospital on the day of your surgery and ideally stay with you through the night.   .  Discharge prescriptions can be filled by the hospital pharmacy during regular pharmacy hours.  If you are having surgery late in the day/evening your prescription may be e-prescribed to your pharmacy.  Please verify your pharmacy hours or chose a 24 hour pharmacy to avoid not having access to your prescription because your pharmacy has closed for the day.    If you are staying overnight following surgery, you will be transported to your hospital room following the recovery period.  [facility] has all private rooms.    If you have any questions please call Pre-Admission Testing at [phone].  Deductibles and co-payments are collected on the day of service. Please be prepared to pay the required co-pay, deductible or deposit on the day of service as defined by your plan.    Call your surgeon immediately if you experience any of the following symptoms:  Sore Throat  Shortness of Breath or difficulty breathing  Cough  Chills  Body soreness or muscle pain  Headache  Fever  New loss of taste or smell  Do not arrive for your surgery ill.  Your procedure will need to be rescheduled to another time.  You will need to call your physician before the day of surgery to avoid any unnecessary exposure to hospital staff as well as other patients.

## 2025-05-21 ENCOUNTER — ANESTHESIA EVENT (OUTPATIENT)
Dept: PERIOP | Facility: HOSPITAL | Age: 40
End: 2025-05-21
Payer: COMMERCIAL

## 2025-05-22 ENCOUNTER — HOSPITAL ENCOUNTER (OUTPATIENT)
Facility: HOSPITAL | Age: 40
Setting detail: HOSPITAL OUTPATIENT SURGERY
Discharge: HOME | End: 2025-05-22
Attending: SURGERY | Admitting: SURGERY
Payer: COMMERCIAL

## 2025-05-22 ENCOUNTER — APPOINTMENT (OUTPATIENT)
Dept: GENERAL RADIOLOGY | Facility: HOSPITAL | Age: 40
End: 2025-05-22
Payer: COMMERCIAL

## 2025-05-22 ENCOUNTER — ANESTHESIA (OUTPATIENT)
Dept: PERIOP | Facility: HOSPITAL | Age: 40
End: 2025-05-22
Payer: COMMERCIAL

## 2025-05-22 VITALS
RESPIRATION RATE: 16 BRPM | SYSTOLIC BLOOD PRESSURE: 137 MMHG | TEMPERATURE: 97.9 F | HEART RATE: 61 BPM | OXYGEN SATURATION: 97 % | DIASTOLIC BLOOD PRESSURE: 82 MMHG

## 2025-05-22 DIAGNOSIS — K82.4 GALLBLADDER POLYP: ICD-10-CM

## 2025-05-22 PROCEDURE — 25810000003 LACTATED RINGERS PER 1000 ML: Performed by: ANESTHESIOLOGY

## 2025-05-22 PROCEDURE — 25010000002 FENTANYL CITRATE (PF) 50 MCG/ML SOLUTION

## 2025-05-22 PROCEDURE — 47001 NDL BIOPSY LVR TM OTH MAJ PX: CPT | Performed by: SURGERY

## 2025-05-22 PROCEDURE — 25810000003 SODIUM CHLORIDE PER 500 ML: Performed by: SURGERY

## 2025-05-22 PROCEDURE — 47562 LAPAROSCOPIC CHOLECYSTECTOMY: CPT | Performed by: SPECIALIST/TECHNOLOGIST, OTHER

## 2025-05-22 PROCEDURE — 25010000002 HYDROMORPHONE PER 4 MG

## 2025-05-22 PROCEDURE — 88304 TISSUE EXAM BY PATHOLOGIST: CPT | Performed by: SURGERY

## 2025-05-22 PROCEDURE — 25510000001 IOPAMIDOL 61 % SOLUTION: Performed by: SURGERY

## 2025-05-22 PROCEDURE — 88307 TISSUE EXAM BY PATHOLOGIST: CPT | Performed by: SURGERY

## 2025-05-22 PROCEDURE — 25010000002 LIDOCAINE 2% SOLUTION

## 2025-05-22 PROCEDURE — 25010000002 PROPOFOL 200 MG/20ML EMULSION

## 2025-05-22 PROCEDURE — 25010000002 DEXAMETHASONE SODIUM PHOSPHATE 20 MG/5ML SOLUTION

## 2025-05-22 PROCEDURE — 88313 SPECIAL STAINS GROUP 2: CPT | Performed by: SURGERY

## 2025-05-22 PROCEDURE — 25010000002 CEFAZOLIN 3 G RECONSTITUTED SOLUTION 1 EACH VIAL: Performed by: SURGERY

## 2025-05-22 PROCEDURE — 47562 LAPAROSCOPIC CHOLECYSTECTOMY: CPT | Performed by: SURGERY

## 2025-05-22 PROCEDURE — 25010000002 GLYCOPYRROLATE 1 MG/5ML SOLUTION

## 2025-05-22 PROCEDURE — 25010000002 FAMOTIDINE 10 MG/ML SOLUTION: Performed by: ANESTHESIOLOGY

## 2025-05-22 PROCEDURE — 25010000002 SUGAMMADEX 200 MG/2ML SOLUTION

## 2025-05-22 PROCEDURE — C1894 INTRO/SHEATH, NON-LASER: HCPCS | Performed by: SURGERY

## 2025-05-22 DEVICE — HORIZON TI ML 6 CLIPS/CART
Type: IMPLANTABLE DEVICE | Site: ABDOMEN | Status: FUNCTIONAL
Brand: WECK

## 2025-05-22 RX ORDER — DROPERIDOL 2.5 MG/ML
0.62 INJECTION, SOLUTION INTRAMUSCULAR; INTRAVENOUS
Status: DISCONTINUED | OUTPATIENT
Start: 2025-05-22 | End: 2025-05-22 | Stop reason: HOSPADM

## 2025-05-22 RX ORDER — FENTANYL CITRATE 50 UG/ML
INJECTION, SOLUTION INTRAMUSCULAR; INTRAVENOUS AS NEEDED
Status: DISCONTINUED | OUTPATIENT
Start: 2025-05-22 | End: 2025-05-22 | Stop reason: SURG

## 2025-05-22 RX ORDER — DEXMEDETOMIDINE HYDROCHLORIDE 100 UG/ML
INJECTION, SOLUTION INTRAVENOUS AS NEEDED
Status: DISCONTINUED | OUTPATIENT
Start: 2025-05-22 | End: 2025-05-22 | Stop reason: SURG

## 2025-05-22 RX ORDER — FLUMAZENIL 0.1 MG/ML
0.2 INJECTION INTRAVENOUS AS NEEDED
Status: DISCONTINUED | OUTPATIENT
Start: 2025-05-22 | End: 2025-05-22 | Stop reason: HOSPADM

## 2025-05-22 RX ORDER — SODIUM CHLORIDE 9 MG/ML
INJECTION, SOLUTION INTRAVENOUS AS NEEDED
Status: DISCONTINUED | OUTPATIENT
Start: 2025-05-22 | End: 2025-05-22 | Stop reason: HOSPADM

## 2025-05-22 RX ORDER — MAGNESIUM HYDROXIDE 1200 MG/15ML
LIQUID ORAL AS NEEDED
Status: DISCONTINUED | OUTPATIENT
Start: 2025-05-22 | End: 2025-05-22 | Stop reason: HOSPADM

## 2025-05-22 RX ORDER — GLYCOPYRROLATE 0.2 MG/ML
INJECTION INTRAMUSCULAR; INTRAVENOUS AS NEEDED
Status: DISCONTINUED | OUTPATIENT
Start: 2025-05-22 | End: 2025-05-22 | Stop reason: SURG

## 2025-05-22 RX ORDER — ACETAMINOPHEN 500 MG
1000 TABLET ORAL
Qty: 12 TABLET | Refills: 0 | Status: SHIPPED | OUTPATIENT
Start: 2025-05-22 | End: 2025-05-25

## 2025-05-22 RX ORDER — FENTANYL CITRATE 50 UG/ML
50 INJECTION, SOLUTION INTRAMUSCULAR; INTRAVENOUS ONCE AS NEEDED
Status: DISCONTINUED | OUTPATIENT
Start: 2025-05-22 | End: 2025-05-22 | Stop reason: HOSPADM

## 2025-05-22 RX ORDER — TRAMADOL HYDROCHLORIDE 50 MG/1
50 TABLET ORAL EVERY 6 HOURS PRN
Qty: 7 TABLET | Refills: 0 | Status: SHIPPED | OUTPATIENT
Start: 2025-05-22

## 2025-05-22 RX ORDER — ONDANSETRON 2 MG/ML
4 INJECTION INTRAMUSCULAR; INTRAVENOUS EVERY 6 HOURS PRN
Status: DISCONTINUED | OUTPATIENT
Start: 2025-05-22 | End: 2025-05-22 | Stop reason: HOSPADM

## 2025-05-22 RX ORDER — HYDRALAZINE HYDROCHLORIDE 20 MG/ML
5 INJECTION INTRAMUSCULAR; INTRAVENOUS
Status: DISCONTINUED | OUTPATIENT
Start: 2025-05-22 | End: 2025-05-22 | Stop reason: HOSPADM

## 2025-05-22 RX ORDER — DEXAMETHASONE SODIUM PHOSPHATE 4 MG/ML
INJECTION, SOLUTION INTRA-ARTICULAR; INTRALESIONAL; INTRAMUSCULAR; INTRAVENOUS; SOFT TISSUE AS NEEDED
Status: DISCONTINUED | OUTPATIENT
Start: 2025-05-22 | End: 2025-05-22 | Stop reason: SURG

## 2025-05-22 RX ORDER — DIPHENHYDRAMINE HYDROCHLORIDE 50 MG/ML
12.5 INJECTION, SOLUTION INTRAMUSCULAR; INTRAVENOUS
Status: DISCONTINUED | OUTPATIENT
Start: 2025-05-22 | End: 2025-05-22 | Stop reason: HOSPADM

## 2025-05-22 RX ORDER — FAMOTIDINE 10 MG/ML
20 INJECTION, SOLUTION INTRAVENOUS ONCE
Status: COMPLETED | OUTPATIENT
Start: 2025-05-22 | End: 2025-05-22

## 2025-05-22 RX ORDER — ATROPINE SULFATE 0.4 MG/ML
0.4 INJECTION, SOLUTION INTRAMUSCULAR; INTRAVENOUS; SUBCUTANEOUS ONCE AS NEEDED
Status: DISCONTINUED | OUTPATIENT
Start: 2025-05-22 | End: 2025-05-22 | Stop reason: HOSPADM

## 2025-05-22 RX ORDER — IPRATROPIUM BROMIDE AND ALBUTEROL SULFATE 2.5; .5 MG/3ML; MG/3ML
3 SOLUTION RESPIRATORY (INHALATION) ONCE AS NEEDED
Status: DISCONTINUED | OUTPATIENT
Start: 2025-05-22 | End: 2025-05-22 | Stop reason: HOSPADM

## 2025-05-22 RX ORDER — TRAMADOL HYDROCHLORIDE 50 MG/1
50 TABLET ORAL ONCE AS NEEDED
Status: COMPLETED | OUTPATIENT
Start: 2025-05-22 | End: 2025-05-22

## 2025-05-22 RX ORDER — IOPAMIDOL 612 MG/ML
INJECTION, SOLUTION INTRAVASCULAR AS NEEDED
Status: DISCONTINUED | OUTPATIENT
Start: 2025-05-22 | End: 2025-05-22 | Stop reason: HOSPADM

## 2025-05-22 RX ORDER — BUPIVACAINE HYDROCHLORIDE AND EPINEPHRINE 5; 5 MG/ML; UG/ML
INJECTION, SOLUTION EPIDURAL; INTRACAUDAL; PERINEURAL AS NEEDED
Status: DISCONTINUED | OUTPATIENT
Start: 2025-05-22 | End: 2025-05-22 | Stop reason: HOSPADM

## 2025-05-22 RX ORDER — OXYCODONE HCL 10 MG/1
10 TABLET, FILM COATED, EXTENDED RELEASE ORAL ONCE
Status: COMPLETED | OUTPATIENT
Start: 2025-05-22 | End: 2025-05-22

## 2025-05-22 RX ORDER — HYDROMORPHONE HYDROCHLORIDE 1 MG/ML
0.5 INJECTION, SOLUTION INTRAMUSCULAR; INTRAVENOUS; SUBCUTANEOUS
Status: DISCONTINUED | OUTPATIENT
Start: 2025-05-22 | End: 2025-05-22 | Stop reason: HOSPADM

## 2025-05-22 RX ORDER — LIDOCAINE HYDROCHLORIDE 10 MG/ML
0.5 INJECTION, SOLUTION INFILTRATION; PERINEURAL ONCE AS NEEDED
Status: DISCONTINUED | OUTPATIENT
Start: 2025-05-22 | End: 2025-05-22 | Stop reason: HOSPADM

## 2025-05-22 RX ORDER — PROPOFOL 10 MG/ML
INJECTION, EMULSION INTRAVENOUS AS NEEDED
Status: DISCONTINUED | OUTPATIENT
Start: 2025-05-22 | End: 2025-05-22 | Stop reason: SURG

## 2025-05-22 RX ORDER — SODIUM CHLORIDE, SODIUM LACTATE, POTASSIUM CHLORIDE, CALCIUM CHLORIDE 600; 310; 30; 20 MG/100ML; MG/100ML; MG/100ML; MG/100ML
9 INJECTION, SOLUTION INTRAVENOUS CONTINUOUS
Status: DISCONTINUED | OUTPATIENT
Start: 2025-05-22 | End: 2025-05-22 | Stop reason: HOSPADM

## 2025-05-22 RX ORDER — SODIUM CHLORIDE 0.9 % (FLUSH) 0.9 %
3 SYRINGE (ML) INJECTION EVERY 12 HOURS SCHEDULED
Status: DISCONTINUED | OUTPATIENT
Start: 2025-05-22 | End: 2025-05-22 | Stop reason: HOSPADM

## 2025-05-22 RX ORDER — EPHEDRINE SULFATE 50 MG/ML
5 INJECTION, SOLUTION INTRAVENOUS ONCE AS NEEDED
Status: DISCONTINUED | OUTPATIENT
Start: 2025-05-22 | End: 2025-05-22 | Stop reason: HOSPADM

## 2025-05-22 RX ORDER — HYDROCODONE BITARTRATE AND ACETAMINOPHEN 5; 325 MG/1; MG/1
1 TABLET ORAL ONCE AS NEEDED
Status: DISCONTINUED | OUTPATIENT
Start: 2025-05-22 | End: 2025-05-22 | Stop reason: HOSPADM

## 2025-05-22 RX ORDER — LIDOCAINE HYDROCHLORIDE 20 MG/ML
INJECTION, SOLUTION INFILTRATION; PERINEURAL AS NEEDED
Status: DISCONTINUED | OUTPATIENT
Start: 2025-05-22 | End: 2025-05-22 | Stop reason: SURG

## 2025-05-22 RX ORDER — PROMETHAZINE HYDROCHLORIDE 25 MG/1
25 TABLET ORAL ONCE AS NEEDED
Status: DISCONTINUED | OUTPATIENT
Start: 2025-05-22 | End: 2025-05-22 | Stop reason: HOSPADM

## 2025-05-22 RX ORDER — LABETALOL HYDROCHLORIDE 5 MG/ML
5 INJECTION, SOLUTION INTRAVENOUS
Status: DISCONTINUED | OUTPATIENT
Start: 2025-05-22 | End: 2025-05-22 | Stop reason: HOSPADM

## 2025-05-22 RX ORDER — IBUPROFEN 400 MG/1
400 TABLET, FILM COATED ORAL
Qty: 6 TABLET | Refills: 0 | Status: SHIPPED | OUTPATIENT
Start: 2025-05-22 | End: 2025-05-25

## 2025-05-22 RX ORDER — OXYCODONE AND ACETAMINOPHEN 7.5; 325 MG/1; MG/1
1 TABLET ORAL EVERY 4 HOURS PRN
Status: DISCONTINUED | OUTPATIENT
Start: 2025-05-22 | End: 2025-05-22 | Stop reason: HOSPADM

## 2025-05-22 RX ORDER — SODIUM CHLORIDE 0.9 % (FLUSH) 0.9 %
3-10 SYRINGE (ML) INJECTION AS NEEDED
Status: DISCONTINUED | OUTPATIENT
Start: 2025-05-22 | End: 2025-05-22 | Stop reason: HOSPADM

## 2025-05-22 RX ORDER — SODIUM CHLORIDE 0.9 % (FLUSH) 0.9 %
10 SYRINGE (ML) INJECTION EVERY 12 HOURS SCHEDULED
Status: DISCONTINUED | OUTPATIENT
Start: 2025-05-22 | End: 2025-05-22 | Stop reason: HOSPADM

## 2025-05-22 RX ORDER — NALOXONE HCL 0.4 MG/ML
0.2 VIAL (ML) INJECTION AS NEEDED
Status: DISCONTINUED | OUTPATIENT
Start: 2025-05-22 | End: 2025-05-22 | Stop reason: HOSPADM

## 2025-05-22 RX ORDER — MIDAZOLAM HYDROCHLORIDE 1 MG/ML
1 INJECTION, SOLUTION INTRAMUSCULAR; INTRAVENOUS
Status: DISCONTINUED | OUTPATIENT
Start: 2025-05-22 | End: 2025-05-22 | Stop reason: HOSPADM

## 2025-05-22 RX ORDER — FENTANYL CITRATE 50 UG/ML
50 INJECTION, SOLUTION INTRAMUSCULAR; INTRAVENOUS
Status: DISCONTINUED | OUTPATIENT
Start: 2025-05-22 | End: 2025-05-22 | Stop reason: HOSPADM

## 2025-05-22 RX ORDER — SODIUM CHLORIDE 0.9 % (FLUSH) 0.9 %
10 SYRINGE (ML) INJECTION AS NEEDED
Status: DISCONTINUED | OUTPATIENT
Start: 2025-05-22 | End: 2025-05-22 | Stop reason: HOSPADM

## 2025-05-22 RX ORDER — ACETAMINOPHEN 500 MG
1000 TABLET ORAL ONCE
Status: COMPLETED | OUTPATIENT
Start: 2025-05-22 | End: 2025-05-22

## 2025-05-22 RX ORDER — ROCURONIUM BROMIDE 10 MG/ML
INJECTION, SOLUTION INTRAVENOUS AS NEEDED
Status: DISCONTINUED | OUTPATIENT
Start: 2025-05-22 | End: 2025-05-22 | Stop reason: SURG

## 2025-05-22 RX ORDER — PROMETHAZINE HYDROCHLORIDE 25 MG/1
25 SUPPOSITORY RECTAL ONCE AS NEEDED
Status: DISCONTINUED | OUTPATIENT
Start: 2025-05-22 | End: 2025-05-22 | Stop reason: HOSPADM

## 2025-05-22 RX ORDER — SODIUM CHLORIDE 9 MG/ML
40 INJECTION, SOLUTION INTRAVENOUS AS NEEDED
Status: DISCONTINUED | OUTPATIENT
Start: 2025-05-22 | End: 2025-05-22 | Stop reason: HOSPADM

## 2025-05-22 RX ORDER — ONDANSETRON 2 MG/ML
4 INJECTION INTRAMUSCULAR; INTRAVENOUS ONCE AS NEEDED
Status: DISCONTINUED | OUTPATIENT
Start: 2025-05-22 | End: 2025-05-22 | Stop reason: HOSPADM

## 2025-05-22 RX ADMIN — GLYCOPYRROLATE 0.1 MCG: 0.2 INJECTION, SOLUTION INTRAMUSCULAR; INTRAVENOUS at 08:32

## 2025-05-22 RX ADMIN — DEXMEDETOMIDINE HYDROCHLORIDE 10 MCG: 100 INJECTION, SOLUTION INTRAVENOUS at 08:56

## 2025-05-22 RX ADMIN — ROCURONIUM BROMIDE 70 MG: 10 INJECTION, SOLUTION INTRAVENOUS at 08:07

## 2025-05-22 RX ADMIN — Medication 20 MG: at 08:45

## 2025-05-22 RX ADMIN — GLYCOPYRROLATE 0.1 MCG: 0.2 INJECTION, SOLUTION INTRAMUSCULAR; INTRAVENOUS at 08:30

## 2025-05-22 RX ADMIN — SUGAMMADEX 600 MG: 100 INJECTION, SOLUTION INTRAVENOUS at 09:08

## 2025-05-22 RX ADMIN — DEXMEDETOMIDINE HYDROCHLORIDE 10 MCG: 100 INJECTION, SOLUTION INTRAVENOUS at 08:52

## 2025-05-22 RX ADMIN — HYDROMORPHONE HYDROCHLORIDE 0.5 MG: 1 INJECTION, SOLUTION INTRAMUSCULAR; INTRAVENOUS; SUBCUTANEOUS at 09:35

## 2025-05-22 RX ADMIN — LIDOCAINE HYDROCHLORIDE 100 MG: 20 INJECTION, SOLUTION INFILTRATION; PERINEURAL at 08:05

## 2025-05-22 RX ADMIN — FENTANYL CITRATE 50 MCG: 50 INJECTION, SOLUTION INTRAMUSCULAR; INTRAVENOUS at 09:45

## 2025-05-22 RX ADMIN — PROPOFOL 300 MG: 10 INJECTION, EMULSION INTRAVENOUS at 08:06

## 2025-05-22 RX ADMIN — OXYCODONE HYDROCHLORIDE 10 MG: 10 TABLET, FILM COATED, EXTENDED RELEASE ORAL at 07:03

## 2025-05-22 RX ADMIN — DEXAMETHASONE SODIUM PHOSPHATE 4 MG: 4 INJECTION, SOLUTION INTRAMUSCULAR; INTRAVENOUS at 08:13

## 2025-05-22 RX ADMIN — PROPOFOL 100 MG: 10 INJECTION, EMULSION INTRAVENOUS at 08:10

## 2025-05-22 RX ADMIN — Medication 30 MG: at 08:30

## 2025-05-22 RX ADMIN — FAMOTIDINE 20 MG: 10 INJECTION INTRAVENOUS at 07:02

## 2025-05-22 RX ADMIN — SODIUM CHLORIDE 3000 MG: 900 INJECTION INTRAVENOUS at 07:50

## 2025-05-22 RX ADMIN — ACETAMINOPHEN 1000 MG: 500 TABLET, FILM COATED ORAL at 07:03

## 2025-05-22 RX ADMIN — DEXMEDETOMIDINE HYDROCHLORIDE 10 MCG: 100 INJECTION, SOLUTION INTRAVENOUS at 09:02

## 2025-05-22 RX ADMIN — HYDROMORPHONE HYDROCHLORIDE 0.5 MG: 1 INJECTION, SOLUTION INTRAMUSCULAR; INTRAVENOUS; SUBCUTANEOUS at 09:27

## 2025-05-22 RX ADMIN — TRAMADOL HYDROCHLORIDE 50 MG: 50 TABLET, COATED ORAL at 10:04

## 2025-05-22 RX ADMIN — SODIUM CHLORIDE, POTASSIUM CHLORIDE, SODIUM LACTATE AND CALCIUM CHLORIDE 9 ML/HR: 600; 310; 30; 20 INJECTION, SOLUTION INTRAVENOUS at 07:03

## 2025-05-22 RX ADMIN — FENTANYL CITRATE 50 MCG: 50 INJECTION, SOLUTION INTRAMUSCULAR; INTRAVENOUS at 08:05

## 2025-05-22 NOTE — ANESTHESIA PREPROCEDURE EVALUATION
Anesthesia Evaluation     Patient summary reviewed and Nursing notes reviewed   NPO Solid Status: > 8 hours  NPO Liquid Status: > 2 hours           Airway   Mallampati: II  TM distance: >3 FB  Neck ROM: full  No difficulty expected  Comment: Grade I view with Padgett 2  Dental - normal exam     Pulmonary - normal exam    breath sounds clear to auscultation  (+) asthma,sleep apnea on CPAP  Cardiovascular - normal exam    ECG reviewed  Rhythm: regular  Rate: normal    (+) hypertension less than 2 medications, hyperlipidemia    ROS comment: EF 54%, mild AI, trace-mild MR, trace TR by ECHO 3/25    Neuro/Psych  GI/Hepatic/Renal/Endo    (+) obesity, morbid obesity, GERD, GI bleeding     Musculoskeletal         ROS comment: Lumbar DDD  Abdominal   (+) obese   Substance History      OB/GYN          Other   blood dyscrasia anemia,       Other Comment: Hgb 12.1                Anesthesia Plan    ASA 3     general     (Do not feel intubation will be difficult but may want CMAC available due to BMI)  intravenous induction     Anesthetic plan, risks, benefits, and alternatives have been provided, discussed and informed consent has been obtained with: patient.      CODE STATUS:

## 2025-05-22 NOTE — ANESTHESIA POSTPROCEDURE EVALUATION
Patient: Fazal Tavarez    Procedure Summary       Date: 05/22/25 Room / Location: [facility] OR [address] [place] [address] MAIN OR    Anesthesia Start: 0756 Anesthesia Stop: 0924    Procedure: laparoscopic cholecystectomy with cholangiogram, possible open, laparoscopic janet cut needle liver biopsy (Abdomen) Diagnosis:       Gallbladder polyp      (Gallbladder polyp [K82.4])    Surgeons: Denise White MD Provider: Rosalino Pace MD    Anesthesia Type: general ASA Status: 3            Anesthesia Type: general    Vitals  Vitals Value Taken Time   /73 05/22/25 10:15   Temp 36.6 °C (97.9 °F) 05/22/25 09:21   Pulse 63 05/22/25 10:18   Resp 16 05/22/25 10:00   SpO2 98 % 05/22/25 10:18   Vitals shown include unfiled device data.        Post Anesthesia Care and Evaluation    Patient location during evaluation: PHASE II  Patient participation: complete - patient participated  Level of consciousness: awake and alert  Pain management: adequate    Airway patency: patent  Anesthetic complications: No anesthetic complications  PONV Status: none  Cardiovascular status: acceptable and hemodynamically stable  Respiratory status: acceptable, nonlabored ventilation and spontaneous ventilation  Hydration status: acceptable

## 2025-05-22 NOTE — PROGRESS NOTES
SURGERY: [facility]  Post op Visit  Fazal Tavarez  05/28/2025    Mr. Tavarez  presents today after having undergone Surgery 5/22/2025, of a laparoscopic cholecystectomy with laparoscopic Julio-Cut liver biopsy.  This was for a 6 mm gallbladder polyp that was enlarging, history of elevated ALT, mildly at 47, mother and father both s/p cholecystectomy.  He does have morbid obesity with a fatty liver.    Intraoperatively he was noted to have a tiny cystic duct, his case being challenging due to his habitus with a BMI of 51, and a firm liver with the decision to opt against that cholangiogram in the face of no convincing evidence of a common duct stone.    His pathology shows chronic cholecystitis with cholesterolosis, and steatohepatitis with 2/4 fibrosis.  Discussed the serious nature of this and the need to work on his weight, etc.  His incisions are healing well.      He will need 16 days postop with heavy activity at work.  He will follow-up with Mady Goss with GI PA, to discuss his liver biopsy results.    Work note given for RTW 6/9/2025 via [employer].     Denise White MD  09:57 EDT

## 2025-05-22 NOTE — OP NOTE
Laparoscopic Cholecystectomy :  Cindy Tavarez  1985    Procedure Date: 05/22/25    Pre-op Diagnosis:   6 mm gallbladder polyp, enlarging (not seen 2021, 4 mm 2024, 6 mm 2026)  History of elevated ALT mildly at 47  Mother and father S/P cholecystectomy    Post-op Diagnosis:   same    Procedure: Laparoscopic cholecystectomy with laparoscopic janet cut liver biopsy    Surgeon: Cnidy    Assistant: Meagan Martinez    Indications: as above    Associated Issues:  JCPS  with fishing trip this week  Morbid obesity    Findings:   Tiny cystic duct with challenging case with his habitus and firm liver, with decision to opt against cholangiogram with no convincing evidence of common duct stone.     Recommendations:   Routine recuperation  16 days off post op with heavy activity at work  Follow up with NESHA Guerrero with liver biopsy results    Technique:     General anesthetic was given.  IV antibiotics were given (Kefzol).  The abdomen was prepped with ChloraPrep and draped sterilely.  A small incision was made with 11 blade above the umbilicus, CO2 insufflated via the Veress needle, followed by a 5 mm trocar, and a 5 mm laparoscope.  Three additional trocars were placed under direct vision, being an 11 mm bladeless in the subxiphoid and two 5 mm nondisposables in the right upper quadrant.    The gallbladder was lifted and had no attachments.  Then the fatty tissue around the ductal structures was pulled down.  The cystic duct was dissected out as a unit with the artery.  Both were small and tethered together.  It was clipped proximally and distally and divided.  The gallbladder was dissected out using the cautery on 20.  The plane between the gallbladder wall and the liver fossa was fairly straight forward.  The gallbladder was placed into an endocatch bag.   Irrigation and suction were carried out to ensure no bilious or bloody efflux from the liver fossa.  The gallbladder was then brought out  through the subxiphoid site, doing so without need to enlarge that site.    The trochar being removed, the liver biopsy janet cut was placed thru that trochar site and 2 sample were taken, deeply, with hemostasis with the cautery.  Irrigation and suction for confirmation.      The subxiphoid site was then closed at the fascial level with 0 Vicryl suture, figure of 8, the dermis at that site with 3-0 Vicryl.  Skin at all sites with 5-0 Vicryl.  Exofin applied.    Specimen: gallbladder  EBL: minimal    Denise White MD  05/22/25      Assistant provided help with exposure, traction, camera holding, suturing, closure and dressings and was critical to a safe and expeditious procedure.

## 2025-05-22 NOTE — ANESTHESIA PROCEDURE NOTES
Airway  Reason: elective    Date/Time: 5/22/2025 8:10 AM  Airway not difficult    General Information and Staff    Patient location during procedure: OR  Anesthesiologist: Rosalino Pace MD  CRNA/CAA: Dalia Olmedo CRNA    Indications and Patient Condition  Indications for airway management: airway protection    Preoxygenated: yes    Mask difficulty assessment: 2 - vent by mask + OA or adjuvant +/- NMBA    Final Airway Details    Final airway type: endotracheal airway      Successful airway: ETT   Successful intubation technique: video laryngoscopy  Adjuncts used in placement: intubating stylet  Endotracheal tube insertion site: oral  Blade type: vision pro trial.  Blade size: D  ETT size (mm): 7.5  Cormack-Lehane Classification: grade I - full view of glottis (long epiglottis)  Placement verified by: chest auscultation and capnometry   Measured from: lips  ETT/EBT  to lips (cm): 23  Number of attempts at approach: 1  Assessment: lips, teeth, and gum same as pre-op and atraumatic intubation

## 2025-05-22 NOTE — H&P
SURGERY  Fazal Tavarez   1985 05/22/25       Chief Complaint: Gallbladder polyp     HPI     Mr. Tavarez is a very nice 39 y.o. male who comes in with history of elevated liver enzymes that led to US liver in 2021 with no polyps but with fatty liver.  Ultrasound 3/19/24 with 4 mm polyp.  US 3/21/2025 shows a 6 mm gallbladder polyp that appears minimally larger than study on 3/19/2024, no gallstones, but with presence of fatty infiltration.       Ultrasound elastography shows a steatosis score of 1, 16% derived fat fraction.     He has indigestion but no right upper quadrant pain or nausea after eating.    Medical History        Past Medical History:   Diagnosis Date    Anemia      COVID-19 12/01/2021    External hemorrhoid      GERD (gastroesophageal reflux disease)      Hyperlipidemia      Hypertension      Perirectal abscess      Rectal bleeding      Seasonal allergies           Surgical History         Past Surgical History:   Procedure Laterality Date    COLONOSCOPY W/ POLYPECTOMY N/A 3/15/2017     Procedure: COLONOSCOPY TO CECUM AND TERMINAL ILEUM;  Surgeon: Pratki Kaur MD;  Location: [facility] ENDOSCOPY;  Service:     ENDOSCOPY N/A 3/15/2017     Procedure: ESOPHAGOGASTRODUODENOSCOPY WITH BIOPSIES;  Surgeon: Pratik Kaur MD;  Location: [facility] ENDOSCOPY;  Service:     INCISION AND DRAINAGE PERIRECTAL ABSCESS N/A 4/4/2018     Procedure: INCISION AND DRAINAGE OF PERIRECTAL ABSCESS;  Surgeon: Denise White MD;  Location: [facility] MAIN OR;  Service: General    WISDOM TOOTH EXTRACTION                   Family History   Problem Relation Age of Onset    Diabetes Father      Diabetes Paternal Uncle        Social History   Social History            Socioeconomic History    Marital status: Single   Tobacco Use    Smoking status: Former       Current packs/day: 1.50       Average packs/day: 1.5 packs/day for 10.0 years (15.0 ttl pk-yrs)       Types: Cigarettes, Electronic Cigarette    Smokeless  "tobacco: Never    Tobacco comments:       quit e cigarettes in 2019.    Vaping Use    Vaping status: Former    Substances: Nicotine    Devices: Disposable   Substance and Sexual Activity    Alcohol use: Yes       Alcohol/week: 2.0 standard drinks of alcohol       Types: 2 Cans of beer per week    Drug use: Yes       Types: Marijuana    Sexual activity: Defer              Current Medications      Current Outpatient Medications:     albuterol sulfate  (90 Base) MCG/ACT inhaler, Inhale 2 puffs Every 4 (Four) Hours As Needed for Wheezing., Disp: , Rfl:     cetirizine (zyrTEC) 10 MG tablet, Take 1 tablet by mouth Daily., Disp: , Rfl:     Flonase Sensimist 27.5 MCG/SPRAY nasal spray, 1 spray 2 (Two) Times a Day., Disp: , Rfl:     ketotifen (ZADITOR) 0.025 % ophthalmic solution, Administer 1 drop to both eyes 2 (Two) Times a Day., Disp: , Rfl:     losartan (COZAAR) 100 MG tablet, Take 1 tablet by mouth Daily., Disp: 14 tablet, Rfl: 0    nadolol (CORGARD) 20 MG tablet, 1 tablet., Disp: , Rfl:     omeprazole (priLOSEC) 40 MG capsule, Take 1 capsule by mouth Daily., Disp: 14 capsule, Rfl: 0    Symbicort 80-4.5 MCG/ACT inhaler, USE 2 INHALATIONS BY MOUTH TWICE DAILY, Disp: , Rfl:         Allergies        Allergies   Allergen Reactions    Lisinopril Cough            PHYSICAL EXAM:  /85   Pulse 79   Ht 188 cm (74\")   Wt (!) 171 kg (378 lb)   SpO2 94%   BMI 48.53 kg/m²   Body mass index is 48.53 kg/m².  Constitutional: well developed, morbidly obese, appears healthy, stated age  ENMT: Hearing intact, neck without masses  CVS: RRR, no murmur  Respiratory: CTA, normal respiratory effort   Gastrointestinal: abdomen soft, nontender, abdominal hernia not detected  Musculoskeletal: gait normal, muscle mass normal  Neurological: awake and alert, seems to have reasonable capacity for understanding for medical decision making  Psychiatric: appears to have reasonable judgement, pleasant     Radiographic/Lab Findings: "   AST 69, ALT 83 in 2021  Reviewed: risk of Gallbladder polyp causing a problem is low, risk of surgery is low as well.  Increasing size of the polyp is a little bit bothersome.      IMPRESSION:  6 mm gallbladder polyp, enlarging (not seen 2021, 4 mm 2024, 6 mm 2026)  History of elevated ALT mildly at 47  Morbid obesity  Mother and father S/P cholecystectomy  [facility]  with fishing trip this week     PLAN:  Lap chol with xray and liver biopsy.  Don't feel strongly about this and if he decides to forego that and just get another US one year from now, i will be agreeable.      Denise White MD    I attest that the copied text was reviewed by me and remains accurate, with impression/plan being relevant to the patient's status today.  I have left the note intact as it has more complete information with images, etc.

## 2025-05-27 LAB
CYTO UR: NORMAL
LAB AP CASE REPORT: NORMAL
LAB AP CLINICAL INFORMATION: NORMAL
LAB AP DIAGNOSIS COMMENT: NORMAL
LAB AP SPECIAL STAINS: NORMAL
PATH REPORT.FINAL DX SPEC: NORMAL
PATH REPORT.GROSS SPEC: NORMAL

## 2025-05-28 ENCOUNTER — OFFICE VISIT (OUTPATIENT)
Dept: SURGERY | Facility: CLINIC | Age: 40
End: 2025-05-28
Payer: COMMERCIAL

## 2025-05-28 VITALS
SYSTOLIC BLOOD PRESSURE: 135 MMHG | DIASTOLIC BLOOD PRESSURE: 80 MMHG | WEIGHT: 315 LBS | HEART RATE: 79 BPM | BODY MASS INDEX: 42.66 KG/M2 | OXYGEN SATURATION: 96 % | HEIGHT: 72 IN

## 2025-05-28 DIAGNOSIS — K82.4 GALLBLADDER POLYP: Primary | ICD-10-CM

## 2025-05-28 DIAGNOSIS — K75.81 STEATOHEPATITIS: ICD-10-CM

## 2025-05-28 PROCEDURE — 99024 POSTOP FOLLOW-UP VISIT: CPT | Performed by: SURGERY

## 2025-05-28 NOTE — LETTER
May 28, 2025     Patient: Fazal Tavarez   YOB: 1985   Date of Visit: 5/28/2025       To Whom It May Concern:    It is my medical opinion that Fazal Tavarez may return to work on 6/9/2025 .           Sincerely,        Denise White MD    CC: No Recipients

## 2025-06-05 ENCOUNTER — TELEPHONE (OUTPATIENT)
Dept: GASTROENTEROLOGY | Facility: CLINIC | Age: 40
End: 2025-06-05
Payer: COMMERCIAL

## (undated) DEVICE — DISPOSABLE MONOPOLAR ENDOSCOPIC CORD 10 FT. (3M): Brand: KIRWAN

## (undated) DEVICE — ENDOPATH PNEUMONEEDLE INSUFFLATION NEEDLES WITH LUER LOCK CONNECTORS 120MM: Brand: ENDOPATH

## (undated) DEVICE — SUT VIC 5/0 PS2 18IN J495H

## (undated) DEVICE — PANTY KNIT MATERN L/XL

## (undated) DEVICE — SINGLE-USE BIOPSY FORCEPS: Brand: RADIAL JAW 4

## (undated) DEVICE — Device: Brand: DEFENDO AIR/WATER/SUCTION AND BIOPSY VALVE

## (undated) DEVICE — ENDOPATH XCEL BLADELESS TROCARS WITH STABILITY SLEEVES: Brand: ENDOPATH XCEL

## (undated) DEVICE — BANDAGE,GAUZE,BULKEE II,4.5"X4.1YD,STRL: Brand: MEDLINE

## (undated) DEVICE — LAPAROSCOPIC SMOKE FILTRATION SYSTEM: Brand: PALL LAPAROSHIELD® PLUS LAPAROSCOPIC SMOKE FILTRATION SYSTEM

## (undated) DEVICE — BITEBLOCK OMNI BLOC

## (undated) DEVICE — INSUFFLATION TUBING SET, WITH GAS FILTER: Brand: N.A.

## (undated) DEVICE — SKIN PREP TRAY W/CHG: Brand: MEDLINE INDUSTRIES, INC.

## (undated) DEVICE — GLV SURG BIOGEL LTX PF 6 1/2

## (undated) DEVICE — APPL CHLORAPREP HI/LITE 26ML ORNG

## (undated) DEVICE — TUBING, SUCTION, 1/4" X 10', STRAIGHT: Brand: MEDLINE

## (undated) DEVICE — LAPAROVUE VISIBILITY SYSTEM LAPAROSCOPIC SOLUTIONS: Brand: LAPAROVUE

## (undated) DEVICE — THE TORRENT IRRIGATION SCOPE CONNECTOR IS USED WITH THE TORRENT IRRIGATION TUBING TO PROVIDE IRRIGATION FLUIDS SUCH AS STERILE WATER DURING GASTROINTESTINAL ENDOSCOPIC PROCEDURES WHEN USED IN CONJUNCTION WITH AN IRRIGATION PUMP (OR ELECTROSURGICAL UNIT).: Brand: TORRENT

## (undated) DEVICE — GAUZE,PACKING STRIP,PLAIN,1"X5YD,STRL,LF: Brand: CURAD

## (undated) DEVICE — SUT VIC 0 TN 27IN DYED JTN0G

## (undated) DEVICE — SPNG GZ WOVN 4X4IN 12PLY 10/BX STRL

## (undated) DEVICE — SOL NACL 0.9PCT 1000ML

## (undated) DEVICE — ENDOPOUCH RETRIEVER SPECIMEN RETRIEVAL BAGS: Brand: ENDOPOUCH RETRIEVER

## (undated) DEVICE — CATH CHOLANG 4.5F18IN BRGNDY

## (undated) DEVICE — SPNG LAP 18X18IN LF STRL PK/5

## (undated) DEVICE — LOU MINOR PROCEDURE: Brand: MEDLINE INDUSTRIES, INC.

## (undated) DEVICE — GLV SURG BIOGEL M LTX PF 6 1/2

## (undated) DEVICE — NDL BIOP W/DEPTH MARK 14G 6IN

## (undated) DEVICE — SYR LL TP 10ML STRL

## (undated) DEVICE — PK LAP CHOLE BG

## (undated) DEVICE — STPCK 3WY D201 DISCOFIX

## (undated) DEVICE — SYR LUERLOK 20CC BX/50

## (undated) DEVICE — CANN NASL CO2 TRULINK W/O2 A/